# Patient Record
Sex: FEMALE | Race: WHITE | NOT HISPANIC OR LATINO | Employment: FULL TIME | URBAN - METROPOLITAN AREA
[De-identification: names, ages, dates, MRNs, and addresses within clinical notes are randomized per-mention and may not be internally consistent; named-entity substitution may affect disease eponyms.]

---

## 2017-01-02 ENCOUNTER — ANTICOAGULATION THERAPY VISIT (OUTPATIENT)
Dept: ANTICOAGULATION | Facility: CLINIC | Age: 37
End: 2017-01-02

## 2017-01-02 DIAGNOSIS — Z79.01 LONG-TERM (CURRENT) USE OF ANTICOAGULANTS: Primary | ICD-10-CM

## 2017-01-02 DIAGNOSIS — D68.52 PROTHROMBIN GENE MUTATION (H): ICD-10-CM

## 2017-01-03 ENCOUNTER — HOSPITAL ENCOUNTER (OUTPATIENT)
Dept: LAB | Facility: CLINIC | Age: 37
Discharge: HOME | End: 2017-01-03
Attending: INTERNAL MEDICINE | Admitting: INTERNAL MEDICINE
Payer: COMMERCIAL

## 2017-01-03 DIAGNOSIS — D68.52: ICD-10-CM

## 2017-01-03 LAB — INR PPP: 2.31 (ref 0.86–1.14)

## 2017-01-03 PROCEDURE — 85610 PROTHROMBIN TIME: CPT | Performed by: INTERNAL MEDICINE

## 2017-01-03 PROCEDURE — 36415 COLL VENOUS BLD VENIPUNCTURE: CPT | Performed by: INTERNAL MEDICINE

## 2017-01-04 ENCOUNTER — ANTICOAGULATION THERAPY VISIT (OUTPATIENT)
Dept: ANTICOAGULATION | Facility: CLINIC | Age: 37
End: 2017-01-04

## 2017-01-04 DIAGNOSIS — Z79.01 LONG-TERM (CURRENT) USE OF ANTICOAGULANTS: Primary | ICD-10-CM

## 2017-01-04 DIAGNOSIS — D68.52 PROTHROMBIN GENE MUTATION (H): ICD-10-CM

## 2017-01-04 NOTE — PROGRESS NOTES
ANTICOAGULATION FOLLOW-UP CLINIC VISIT    Patient Name:  Stacey Maki  Date:  1/4/2017  Contact Type:  Telephone    SUBJECTIVE:        OBJECTIVE    INR   Date Value Ref Range Status   01/03/2017 2.31* 0.86 - 1.14 Final       ASSESSMENT / PLAN  INR assessment THER    Recheck INR In: 4 WEEKS    INR Location Clinic      Anticoagulation Summary as of 1/4/2017     INR goal 2.0-3.0   Selected INR 2.31 (1/3/2017)   Maintenance plan 4 mg (2 mg x 2) every day   Full instructions 4 mg every day   Weekly total 28 mg   Plan last modified Karina Khoury RN (11/11/2016)   Next INR check 1/31/2017   Priority INR   Target end date     Indications   Long-term (current) use of anticoagulants [Z79.01] [Z79.01]  Prothrombin gene mutation (H) [D68.59]         Anticoagulation Episode Summary     INR check location     Preferred lab     Send INR reminders to [facility]    Comments Patient has 2 mg Tablets  Patient contact number [phone]  HIPAA INFO OK to leave messages on home or cell phone    OK to speak byron Potts      Anticoagulation Care Providers     Provider Role Specialty Phone number    Danny Clark MD Responsible Hematology [phone]            See the Encounter Report to view Anticoagulation Flowsheet and Dosing Calendar (Go to Encounters tab in chart review, and find the Anticoagulation Therapy Visit)  LM for patient.    Karina Khoury RN

## 2017-03-07 ENCOUNTER — HOSPITAL ENCOUNTER (OUTPATIENT)
Dept: LAB | Facility: CLINIC | Age: 37
Discharge: HOME | End: 2017-03-07
Attending: INTERNAL MEDICINE | Admitting: INTERNAL MEDICINE
Payer: COMMERCIAL

## 2017-03-07 DIAGNOSIS — D68.52: ICD-10-CM

## 2017-03-07 LAB — INR PPP: 1.33 (ref 0.86–1.14)

## 2017-03-07 PROCEDURE — 85610 PROTHROMBIN TIME: CPT | Performed by: INTERNAL MEDICINE

## 2017-03-07 PROCEDURE — 36415 COLL VENOUS BLD VENIPUNCTURE: CPT | Performed by: INTERNAL MEDICINE

## 2017-03-08 ENCOUNTER — ANTICOAGULATION THERAPY VISIT (OUTPATIENT)
Dept: ANTICOAGULATION | Facility: CLINIC | Age: 37
End: 2017-03-08

## 2017-03-08 DIAGNOSIS — D68.52 PROTHROMBIN GENE MUTATION (H): ICD-10-CM

## 2017-03-08 DIAGNOSIS — Z79.01 LONG-TERM (CURRENT) USE OF ANTICOAGULANTS: ICD-10-CM

## 2017-03-08 NOTE — PROGRESS NOTES
ANTICOAGULATION FOLLOW-UP CLINIC VISIT    Patient Name:  Stacey Maki  Date:  3/8/2017  Contact Type:  Telephone    SUBJECTIVE:     Patient Findings     Positives Change in diet/appetite, Missed doses    Comments Pt reports missing dose on Олег 3/5. Also reports going on [employer], but her green intake is the same she has just cut out processed food           OBJECTIVE    INR   Date Value Ref Range Status   03/07/2017 1.33 (H) 0.86 - 1.14 Final       ASSESSMENT / PLAN  INR assessment SUB    Recheck INR In: 1 WEEK    INR Location Clinic      Anticoagulation Summary as of 3/8/2017     INR goal 2.0-3.0   Today's INR 1.33! (3/7/2017)   Maintenance plan 4 mg (2 mg x 2) every day   Full instructions 3/8: 6 mg; Otherwise 4 mg every day   Weekly total 28 mg   Plan last modified Karina Khoury RN (11/11/2016)   Next INR check 3/15/2017   Priority INR   Target end date     Indications   Long-term (current) use of anticoagulants [Z79.01] [Z79.01]  Prothrombin gene mutation (H) [D68.59]         Anticoagulation Episode Summary     INR check location     Preferred lab     Send INR reminders to [facility]    Comments Patient has 2 mg Tablets  Patient contact number [phone]  HIPAA INFO OK to leave messages on home or cell phone    OK to speak byron Potts      Anticoagulation Care Providers     Provider Role Specialty Phone number    Danny Clark MD Responsible Hematology [phone]            See the Encounter Report to view Anticoagulation Flowsheet and Dosing Calendar (Go to Encounters tab in chart review, and find the Anticoagulation Therapy Visit)    Spoke with Stacey Blackwood RN

## 2017-03-08 NOTE — MR AVS SNAPSHOT
Stacey Maki   3/8/2017   Anticoagulation Therapy Visit    Description:  36 year old female   Provider:  Praveena Blackwood, RN   Department:  [facility]           INR as of 3/8/2017     Today's INR 1.33! (3/7/2017)      Anticoagulation Summary as of 3/8/2017     INR goal 2.0-3.0   Today's INR 1.33! (3/7/2017)   Full instructions 3/8: 6 mg; Otherwise 4 mg every day   Next INR check 3/15/2017    Indications   Long-term (current) use of anticoagulants [Z79.01] [Z79.01]  Prothrombin gene mutation (H) [D68.59]         March 2017 Details    Sun Mon Tue Wed Thu Fri Sat        1               2               3               4                 5               6               7               8      6 mg   See details      9      4 mg         10      4 mg         11      4 mg           12      4 mg         13      4 mg         14      4 mg         15            16               17               18                 19               20               21               22               23               24               25                 26               27               28               29               30               31                 Date Details   03/08 This INR check       Date of next INR:  3/15/2017         How to take your warfarin dose     To take:  4 mg Take 2 of the 2 mg tablets.    To take:  6 mg Take 3 of the 2 mg tablets.

## 2017-03-15 ENCOUNTER — ANTICOAGULATION THERAPY VISIT (OUTPATIENT)
Dept: ANTICOAGULATION | Facility: CLINIC | Age: 37
End: 2017-03-15

## 2017-03-15 ENCOUNTER — HOSPITAL ENCOUNTER (OUTPATIENT)
Dept: LAB | Facility: CLINIC | Age: 37
Discharge: HOME | End: 2017-03-15
Attending: INTERNAL MEDICINE | Admitting: INTERNAL MEDICINE
Payer: COMMERCIAL

## 2017-03-15 DIAGNOSIS — Z79.01 LONG-TERM (CURRENT) USE OF ANTICOAGULANTS: ICD-10-CM

## 2017-03-15 DIAGNOSIS — D68.52 PROTHROMBIN GENE MUTATION (H): ICD-10-CM

## 2017-03-15 DIAGNOSIS — D68.52: ICD-10-CM

## 2017-03-15 LAB — INR PPP: 1.98 (ref 0.86–1.14)

## 2017-03-15 PROCEDURE — 85610 PROTHROMBIN TIME: CPT | Performed by: INTERNAL MEDICINE

## 2017-03-15 PROCEDURE — 36415 COLL VENOUS BLD VENIPUNCTURE: CPT | Performed by: INTERNAL MEDICINE

## 2017-03-15 NOTE — PROGRESS NOTES
ANTICOAGULATION FOLLOW-UP CLINIC VISIT    Patient Name:  Stacey Maki  Date:  3/15/2017  Contact Type:  Telephone    SUBJECTIVE:     Patient Findings     Comments Stacey is starting Valtrex today for shingles.  She reports that it is a small area near her lip.  According to the literature Valtrex should not interfere with the warfarin.  If Stacey's shingle case becomes worse she will have her INR tested next week.           OBJECTIVE    INR   Date Value Ref Range Status   03/15/2017 1.98 (H) 0.86 - 1.14 Final       ASSESSMENT / PLAN  INR assessment THER    Recheck INR In: 2 WEEKS    INR Location Clinic      Anticoagulation Summary as of 3/15/2017     INR goal 2.0-3.0   Today's INR 1.98!   Maintenance plan 6 mg (2 mg x 3) on Wed; 4 mg (2 mg x 2) all other days   Full instructions 3/15: 6 mg; Otherwise 6 mg on Wed; 4 mg all other days   Weekly total 30 mg   Plan last modified Leticia Cho RN (3/15/2017)   Next INR check 3/22/2017   Priority INR   Target end date     Indications   Long-term (current) use of anticoagulants [Z79.01] [Z79.01]  Prothrombin gene mutation (H) [D68.59]         Anticoagulation Episode Summary     INR check location     Preferred lab     Send [facility] reminders to [facility]    Comments Patient has 2 mg Tablets  Patient contact number [phone]  HIPAA INFO OK to leave messages on home or cell phone    OK to speak byron Potts      Anticoagulation Care Providers     Provider Role Specialty Phone number    Danny Clark MD Responsible Hematology [phone]            See the Encounter Report to view Anticoagulation Flowsheet and Dosing Calendar (Go to Encounters tab in chart review, and find the Anticoagulation Therapy Visit)    Spoke with Stacey.    Leticia Cho RN

## 2017-03-15 NOTE — MR AVS SNAPSHOT
Stacey Laurynmariano Maki   3/15/2017   Anticoagulation Therapy Visit    Description:  36 year old female   Provider:  Leticia Cho, RN   Department:  [facility]           INR as of 3/15/2017     Today's INR 1.98!      Anticoagulation Summary as of 3/15/2017     INR goal 2.0-3.0   Today's INR 1.98!   Full instructions 3/15: 6 mg; Otherwise 6 mg on Wed; 4 mg all other days   Next INR check 3/22/2017    Indications   Long-term (current) use of anticoagulants [Z79.01] [Z79.01]  Prothrombin gene mutation (H) [D68.59]         March 2017 Details    Sun Mon Tue Wed Thu Fri Sat        1               2               3               4                 5               6               7               8               9               10               11                 12               13               14               15      6 mg   See details      16      4 mg         17      4 mg         18      4 mg           19      4 mg         20      4 mg         21      4 mg         22            23               24               25                 26               27               28               29               30               31                 Date Details   03/15 This INR check       Date of next INR:  3/22/2017         How to take your warfarin dose     To take:  4 mg Take 2 of the 2 mg tablets.    To take:  6 mg Take 3 of the 2 mg tablets.

## 2017-04-05 ENCOUNTER — ANTICOAGULATION THERAPY VISIT (OUTPATIENT)
Dept: ANTICOAGULATION | Facility: CLINIC | Age: 37
End: 2017-04-05

## 2017-04-05 DIAGNOSIS — D68.52 PROTHROMBIN GENE MUTATION (H): ICD-10-CM

## 2017-04-05 DIAGNOSIS — Z79.01 LONG-TERM (CURRENT) USE OF ANTICOAGULANTS: ICD-10-CM

## 2017-04-05 NOTE — MR AVS SNAPSHOT
Stacey Maki   4/5/2017   Anticoagulation Therapy Visit    Description:  37 year old female   Provider:  Adam Koch, [facility]   Department:  [facility]           INR as of 4/5/2017     Today's INR No new INR was available at the time of this encounter.      Anticoagulation Summary as of 4/5/2017     INR goal 2.0-3.0   Today's INR No new INR was available at the time of this encounter.   Full instructions 6 mg on Wed; 4 mg all other days   Next INR check 4/7/2017    Indications   Long-term (current) use of anticoagulants [Z79.01] [Z79.01]  Prothrombin gene mutation (H) [D68.59]         April 2017 Details    Sun Mon Tue Wed Thu Fri Sat           1                 2               3               4               5      6 mg   See details      6      4 mg         7            8                 9               10               11               12               13               14               15                 16               17               18               19               20               21               22                 23               24               25               26               27               28               29                 30                      Date Details   04/05 This INR check       Date of next INR:  4/7/2017         How to take your warfarin dose     To take:  4 mg Take 2 of the 2 mg tablets.    To take:  6 mg Take 3 of the 2 mg tablets.

## 2017-04-05 NOTE — PROGRESS NOTES
Stacey called today to say that she has been on vacation and now has sick children. She will come in for her labs in the next 2 days.

## 2017-04-07 ENCOUNTER — HOSPITAL ENCOUNTER (OUTPATIENT)
Dept: LAB | Facility: CLINIC | Age: 37
Discharge: HOME | End: 2017-04-07
Attending: INTERNAL MEDICINE | Admitting: INTERNAL MEDICINE
Payer: COMMERCIAL

## 2017-04-07 ENCOUNTER — ANTICOAGULATION THERAPY VISIT (OUTPATIENT)
Dept: ANTICOAGULATION | Facility: CLINIC | Age: 37
End: 2017-04-07

## 2017-04-07 DIAGNOSIS — Z79.01 LONG-TERM (CURRENT) USE OF ANTICOAGULANTS: ICD-10-CM

## 2017-04-07 DIAGNOSIS — D68.52 PROTHROMBIN GENE MUTATION (H): ICD-10-CM

## 2017-04-07 LAB — INR PPP: 3.01 (ref 0.86–1.14)

## 2017-04-07 PROCEDURE — 85610 PROTHROMBIN TIME: CPT | Performed by: INTERNAL MEDICINE

## 2017-04-07 PROCEDURE — 36415 COLL VENOUS BLD VENIPUNCTURE: CPT | Performed by: INTERNAL MEDICINE

## 2017-04-07 NOTE — PROGRESS NOTES
ANTICOAGULATION FOLLOW-UP CLINIC VISIT    Patient Name:  Stacey Maki  Date:  4/7/2017  Contact Type:  Telephone    SUBJECTIVE:        OBJECTIVE    INR   Date Value Ref Range Status   04/07/2017 3.01 (H) 0.86 - 1.14 Final       ASSESSMENT / PLAN  INR assessment THER    Recheck INR In: 2 WEEKS    INR Location Clinic      Anticoagulation Summary as of 4/7/2017     INR goal 2.0-3.0   Today's INR 3.01!   Maintenance plan 5 mg (2 mg x 2.5) on Wed; 4 mg (2 mg x 2) all other days   Full instructions 5 mg on Wed; 4 mg all other days   Weekly total 29 mg   Plan last modified Karina Khoury RN (4/7/2017)   Next INR check 4/21/2017   Priority INR   Target end date     Indications   Long-term (current) use of anticoagulants [Z79.01] [Z79.01]  Prothrombin gene mutation (H) [D68.59]         Anticoagulation Episode Summary     INR check location     Preferred lab     Send INR reminders to [facility]    Comments Patient has 2 mg Tablets  Patient contact number [phone]  HIPAA INFO OK to leave messages on home or cell phone    OK to speak byron Potts      Anticoagulation Care Providers     Provider Role Specialty Phone number    Danny Clark MD Responsible Hematology [phone]            See the Encounter Report to view Anticoagulation Flowsheet and Dosing Calendar (Go to Encounters tab in chart review, and find the Anticoagulation Therapy Visit)    LM for patient.    Karina Khoury RN

## 2017-04-07 NOTE — MR AVS SNAPSHOT
Stacey Combs Glynn   4/7/2017   Anticoagulation Therapy Visit    Description:  37 year old female   Provider:  Karina Khoury, RN   Department:  [facility]           INR as of 4/7/2017     Today's INR 3.01!      Anticoagulation Summary as of 4/7/2017     INR goal 2.0-3.0   Today's INR 3.01!   Full instructions 5 mg on Wed; 4 mg all other days   Next INR check 4/21/2017    Indications   Long-term (current) use of anticoagulants [Z79.01] [Z79.01]  Prothrombin gene mutation (H) [D68.59]         April 2017 Details    Sun Mon Tue Wed Thu Fri Sat           1                 2               3               4               5               6               7      4 mg   See details      8      4 mg           9      4 mg         10      4 mg         11      4 mg         12      5 mg         13      4 mg         14      4 mg         15      4 mg           16      4 mg         17      4 mg         18      4 mg         19      5 mg         20      4 mg         21            22                 23               24               25               26               27               28               29                 30                      Date Details   04/07 This INR check       Date of next INR:  4/21/2017         How to take your warfarin dose     To take:  4 mg Take 2 of the 2 mg tablets.    To take:  5 mg Take 2.5 of the 2 mg tablets.

## 2017-05-02 ENCOUNTER — ANTICOAGULATION THERAPY VISIT (OUTPATIENT)
Dept: ANTICOAGULATION | Facility: CLINIC | Age: 37
End: 2017-05-02

## 2017-05-02 ENCOUNTER — HOSPITAL ENCOUNTER (OUTPATIENT)
Dept: LAB | Facility: CLINIC | Age: 37
Discharge: HOME | End: 2017-05-02
Attending: INTERNAL MEDICINE | Admitting: INTERNAL MEDICINE
Payer: COMMERCIAL

## 2017-05-02 DIAGNOSIS — Z79.01 LONG-TERM (CURRENT) USE OF ANTICOAGULANTS: ICD-10-CM

## 2017-05-02 DIAGNOSIS — D68.52 PROTHROMBIN GENE MUTATION (H): ICD-10-CM

## 2017-05-02 LAB — INR PPP: 2.08 (ref 0.86–1.14)

## 2017-05-02 PROCEDURE — 85610 PROTHROMBIN TIME: CPT | Performed by: INTERNAL MEDICINE

## 2017-05-02 PROCEDURE — 36415 COLL VENOUS BLD VENIPUNCTURE: CPT | Performed by: INTERNAL MEDICINE

## 2017-05-02 NOTE — PROGRESS NOTES
ANTICOAGULATION FOLLOW-UP CLINIC VISIT    Patient Name:  Stacey Maki  Date:  5/2/2017  Contact Type:  Telephone    SUBJECTIVE:        OBJECTIVE    INR   Date Value Ref Range Status   05/02/2017 2.08 (H) 0.86 - 1.14 Final       ASSESSMENT / PLAN  INR assessment THER    Recheck INR In: 4 WEEKS    INR Location Clinic      Anticoagulation Summary as of 5/2/2017     INR goal 2.0-3.0   Today's INR 2.08   Maintenance plan 6 mg (2 mg x 3) on Wed; 4 mg (2 mg x 2) all other days   Full instructions 6 mg on Wed; 4 mg all other days   Weekly total 30 mg   Plan last modified Karina Khoury RN (5/2/2017)   Next INR check 5/30/2017   Priority INR   Target end date     Indications   Long-term (current) use of anticoagulants [Z79.01] [Z79.01]  Prothrombin gene mutation (H) [D68.59]         Anticoagulation Episode Summary     INR check location     Preferred lab     Send INR reminders to [facility]    Comments Patient has 2 mg Tablets  Patient contact number [phone]  HIPAA INFO OK to leave messages on home or cell phone    OK to speak byron Potts      Anticoagulation Care Providers     Provider Role Specialty Phone number    Danny Clark MD Responsible Hematology [phone]            See the Encounter Report to view Anticoagulation Flowsheet and Dosing Calendar (Go to Encounters tab in chart review, and find the Anticoagulation Therapy Visit)    Left message for patient with results and dosing recommendations. Asked patient to call back to report any missed doses, falls, signs and symptoms of bleeding or clotting, any changes in health, medication, or diet. Asked patient to call back with any questions or concerns.      Karina Khoury RN

## 2017-05-02 NOTE — MR AVS SNAPSHOT
Stacey Combs Maki   5/2/2017   Anticoagulation Therapy Visit    Description:  37 year old female   Provider:  Karina Khoury, RN   Department:  [facility]           INR as of 5/2/2017     Today's INR 2.08      Anticoagulation Summary as of 5/2/2017     INR goal 2.0-3.0   Today's INR 2.08   Full instructions 6 mg on Wed; 4 mg all other days   Next INR check 5/30/2017    Indications   Long-term (current) use of anticoagulants [Z79.01] [Z79.01]  Prothrombin gene mutation (H) [D68.59]         May 2017 Details    Sun Mon Tue Wed Thu Fri Sat      1               2      4 mg   See details      3      6 mg         4      4 mg         5      4 mg         6      4 mg           7      4 mg         8      4 mg         9      4 mg         10      6 mg         11      4 mg         12      4 mg         13      4 mg           14      4 mg         15      4 mg         16      4 mg         17      6 mg         18      4 mg         19      4 mg         20      4 mg           21      4 mg         22      4 mg         23      4 mg         24      6 mg         25      4 mg         26      4 mg         27      4 mg           28      4 mg         29      4 mg         30            31                   Date Details   05/02 This INR check       Date of next INR:  5/30/2017         How to take your warfarin dose     To take:  4 mg Take 2 of the 2 mg tablets.    To take:  6 mg Take 3 of the 2 mg tablets.

## 2017-06-19 ENCOUNTER — HOSPITAL ENCOUNTER (OUTPATIENT)
Dept: LAB | Facility: CLINIC | Age: 37
Discharge: HOME | End: 2017-06-19
Attending: INTERNAL MEDICINE | Admitting: INTERNAL MEDICINE
Payer: COMMERCIAL

## 2017-06-19 DIAGNOSIS — Z79.01 LONG-TERM (CURRENT) USE OF ANTICOAGULANTS: ICD-10-CM

## 2017-06-19 DIAGNOSIS — D68.52 PROTHROMBIN GENE MUTATION (H): ICD-10-CM

## 2017-06-19 LAB — INR PPP: 2.13 (ref 0.86–1.14)

## 2017-06-19 PROCEDURE — 36415 COLL VENOUS BLD VENIPUNCTURE: CPT | Performed by: INTERNAL MEDICINE

## 2017-06-19 PROCEDURE — 85610 PROTHROMBIN TIME: CPT | Performed by: INTERNAL MEDICINE

## 2017-06-20 ENCOUNTER — ANTICOAGULATION THERAPY VISIT (OUTPATIENT)
Dept: ANTICOAGULATION | Facility: CLINIC | Age: 37
End: 2017-06-20

## 2017-06-20 DIAGNOSIS — D68.52 PROTHROMBIN GENE MUTATION (H): ICD-10-CM

## 2017-06-20 DIAGNOSIS — Z79.01 LONG-TERM (CURRENT) USE OF ANTICOAGULANTS: ICD-10-CM

## 2017-06-20 NOTE — MR AVS SNAPSHOT
Stacey Combs Glynn   6/20/2017   Anticoagulation Therapy Visit    Description:  37 year old female   Provider:  Merced Morrell, RN   Department:  [facility]           INR as of 6/20/2017     Today's INR 2.13 (6/19/2017)      Anticoagulation Summary as of 6/20/2017     INR goal 2.0-3.0   Today's INR 2.13 (6/19/2017)   Full instructions 6 mg on Wed; 4 mg all other days   Next INR check 7/17/2017    Indications   Long-term (current) use of anticoagulants [Z79.01] [Z79.01]  Prothrombin gene mutation (H) [D68.59]         June 2017 Details    Sun Mon Tue Wed Thu Fri Sat         1               2               3                 4               5               6               7               8               9               10                 11               12               13               14               15               16               17                 18               19               20      4 mg   See details      21      6 mg         22      4 mg         23      4 mg         24      4 mg           25      4 mg         26      4 mg         27      4 mg         28      6 mg         29      4 mg         30      4 mg           Date Details   06/20 This INR check               How to take your warfarin dose     To take:  4 mg Take 2 of the 2 mg tablets.    To take:  6 mg Take 3 of the 2 mg tablets.           July 2017 Details    Sun Mon Tue Wed Thu Fri Sat           1      4 mg           2      4 mg         3      4 mg         4      4 mg         5      6 mg         6      4 mg         7      4 mg         8      4 mg           9      4 mg         10      4 mg         11      4 mg         12      6 mg         13      4 mg         14      4 mg         15      4 mg           16      4 mg         17            18               19               20               21               22                 23               24               25               26               27               28               29                  30               31                     Date Details   No additional details    Date of next INR:  7/17/2017         How to take your warfarin dose     To take:  4 mg Take 2 of the 2 mg tablets.    To take:  6 mg Take 3 of the 2 mg tablets.

## 2017-06-20 NOTE — PROGRESS NOTES
ANTICOAGULATION FOLLOW-UP CLINIC VISIT    Patient Name:  Stacey Maki  Date:  6/20/2017  Contact Type:  Telephone    SUBJECTIVE:        OBJECTIVE    INR   Date Value Ref Range Status   06/19/2017 2.13 (H) 0.86 - 1.14 Final       ASSESSMENT / PLAN  INR assessment THER    Recheck INR In: 4 WEEKS    INR Location Clinic      Anticoagulation Summary as of 6/20/2017     INR goal 2.0-3.0   Today's INR 2.13 (6/19/2017)   Maintenance plan 6 mg (2 mg x 3) on Wed; 4 mg (2 mg x 2) all other days   Full instructions 6 mg on Wed; 4 mg all other days   Weekly total 30 mg   No change documented Merced Morrell RN   Plan last modified Karina Khoury RN (5/2/2017)   Next INR check 7/17/2017   Priority INR   Target end date     Indications   Long-term (current) use of anticoagulants [Z79.01] [Z79.01]  Prothrombin gene mutation (H) [D68.59]         Anticoagulation Episode Summary     INR check location     Preferred lab     Send INR reminders to [facility]    Comments Patient has 2 mg Tablets  Patient contact number [phone]  HIPAA INFO OK to leave messages on home or cell phone    OK to speak byron Potts      Anticoagulation Care Providers     Provider Role Specialty Phone number    Danny Clark MD Responsible Hematology [phone]            See the Encounter Report to view Anticoagulation Flowsheet and Dosing Calendar (Go to Encounters tab in chart review, and find the Anticoagulation Therapy Visit)    Left message with results and dosing recommendations. Asked patient to call back to report any missed doses, falls, signs and symptoms of bleeding or clotting, or any changes to health or diet.     Merced Morrell, RN

## 2017-07-05 DIAGNOSIS — I82.409 DEEP VEIN THROMBOSIS (H): ICD-10-CM

## 2017-07-05 RX ORDER — WARFARIN SODIUM 2 MG/1
TABLET ORAL
Qty: 65 TABLET | Refills: 0 | Status: SHIPPED | OUTPATIENT
Start: 2017-07-05 | End: 2017-09-11

## 2017-08-14 DIAGNOSIS — Z79.01 LONG TERM CURRENT USE OF ANTICOAGULANT THERAPY: Primary | ICD-10-CM

## 2017-08-14 RX ORDER — WARFARIN SODIUM 2 MG/1
TABLET ORAL
Qty: 65 TABLET | Refills: 0 | Status: SHIPPED | OUTPATIENT
Start: 2017-08-14 | End: 2017-09-11 | Stop reason: DRUGHIGH

## 2017-08-23 ENCOUNTER — ANTICOAGULATION THERAPY VISIT (OUTPATIENT)
Dept: ANTICOAGULATION | Facility: CLINIC | Age: 37
End: 2017-08-23

## 2017-08-23 DIAGNOSIS — D68.52 PROTHROMBIN GENE MUTATION (H): ICD-10-CM

## 2017-08-23 DIAGNOSIS — Z79.01 LONG-TERM (CURRENT) USE OF ANTICOAGULANTS: ICD-10-CM

## 2017-08-23 NOTE — MR AVS SNAPSHOT
Stacey Maki   8/23/2017   Anticoagulation Therapy Visit    Description:  37 year old female   Provider:  Adam Koch, [facility]   Department:  [facility]           INR as of 8/23/2017     Today's INR       Anticoagulation Summary as of 8/23/2017     INR goal 2.0-3.0   Today's INR    Full instructions 6 mg on Wed; 4 mg all other days   Next INR check 8/23/2017    Indications   Long-term (current) use of anticoagulants [Z79.01] [Z79.01]  Prothrombin gene mutation (H) [D68.52]         August 2017 Details    Sun Mon Tue Wed Thu Fri Sat       1               2               3               4               5                 6               7               8               9               10               11               12                 13               14               15               16               17               18               19                 20               21               22               23      See details      24               25               26                 27               28               29               30               31                  Date Details   08/23 This INR check       Date of next INR:  8/23/2017         How to take your warfarin dose     To take:  6 mg Take 3 of the 2 mg tablets.

## 2017-08-23 NOTE — PROGRESS NOTES
Left message for patient that they have not been compliant with having the necessary lab work for their anticoagulation therapy. Patient  has been sent two letters stating that they have missed their lab appointments and that it is very important to have labs done to make sure that they are in their therapeutic range to minimize the risks of bleeding and clotting. I asked them to call us or come in for their lab work as soon as possible. Patient will be inactivated from our anticoagulation monitoring service if they do not respond within one week.   8/31/17    Stacey called to report that she will be in this week to have her INR done. WK

## 2017-08-24 ENCOUNTER — HOSPITAL ENCOUNTER (OUTPATIENT)
Dept: LAB | Facility: CLINIC | Age: 37
Discharge: HOME | End: 2017-08-24
Attending: INTERNAL MEDICINE | Admitting: INTERNAL MEDICINE
Payer: COMMERCIAL

## 2017-08-24 DIAGNOSIS — Z79.01 LONG-TERM (CURRENT) USE OF ANTICOAGULANTS: ICD-10-CM

## 2017-08-24 DIAGNOSIS — D68.52 PROTHROMBIN GENE MUTATION (H): ICD-10-CM

## 2017-08-24 LAB — INR PPP: 2.44 (ref 0.86–1.14)

## 2017-08-24 PROCEDURE — 36415 COLL VENOUS BLD VENIPUNCTURE: CPT | Performed by: INTERNAL MEDICINE

## 2017-08-24 PROCEDURE — 85610 PROTHROMBIN TIME: CPT | Performed by: INTERNAL MEDICINE

## 2017-09-11 ENCOUNTER — ANTICOAGULATION THERAPY VISIT (OUTPATIENT)
Dept: ANTICOAGULATION | Facility: CLINIC | Age: 37
End: 2017-09-11

## 2017-09-11 DIAGNOSIS — D68.52 PROTHROMBIN GENE MUTATION (H): ICD-10-CM

## 2017-09-11 DIAGNOSIS — I82.409 DEEP VEIN THROMBOSIS (H): ICD-10-CM

## 2017-09-11 DIAGNOSIS — Z79.01 LONG-TERM (CURRENT) USE OF ANTICOAGULANTS: ICD-10-CM

## 2017-09-11 RX ORDER — WARFARIN SODIUM 2 MG/1
TABLET ORAL
Qty: 65 TABLET | Refills: 4 | Status: SHIPPED | OUTPATIENT
Start: 2017-09-11 | End: 2018-02-19

## 2017-09-11 NOTE — PROGRESS NOTES
ANTICOAGULATION FOLLOW-UP CLINIC VISIT    Patient Name:  Stacey Maki  Date:  9/11/2017  Contact Type:  Telephone    SUBJECTIVE:        OBJECTIVE    INR   Date Value Ref Range Status   08/24/2017 2.44 (H) 0.86 - 1.14 Final       ASSESSMENT / PLAN  INR assessment THER    Recheck INR In: 6 WEEKS    INR Location Clinic      Anticoagulation Summary as of 9/11/2017     INR goal 2.0-3.0   Today's INR 2.44 (8/24/2017)   Maintenance plan 6 mg (2 mg x 3) on Wed; 4 mg (2 mg x 2) all other days   Full instructions 6 mg on Wed; 4 mg all other days   Weekly total 30 mg   Plan last modified Karina Khoury RN (5/2/2017)   Next INR check 10/5/2017   Priority INR   Target end date     Indications   Long-term (current) use of anticoagulants [Z79.01] [Z79.01]  Prothrombin gene mutation (H) [D68.52]         Anticoagulation Episode Summary     INR check location     Preferred lab     Send INR reminders to [facility]    Comments Patient has 2 mg Tablets  Patient contact number [phone]  HIPAA INFO OK to leave messages on home or cell phone    OK to speak byron Potts      Anticoagulation Care Providers     Provider Role Specialty Phone number    Danny Clark MD Responsible Hematology [phone]            See the Encounter Report to view Anticoagulation Flowsheet and Dosing Calendar (Go to Encounters tab in chart review, and find the Anticoagulation Therapy Visit)    Left message for patient with results and dosing recommendations. Asked patient to call back to report any missed doses, falls, signs and symptoms of bleeding or clotting, any changes in health, medication, or diet. Asked patient to call back with any questions or concerns.      Adam Koch [facility]

## 2017-09-11 NOTE — MR AVS SNAPSHOT
Stacey Combs Maki   9/11/2017   Anticoagulation Therapy Visit    Description:  37 year old female   Provider:  Adam Koch [facility]   Department:  [facility]           INR as of 9/11/2017     Today's INR 2.44 (8/24/2017)      Anticoagulation Summary as of 9/11/2017     INR goal 2.0-3.0   Today's INR 2.44 (8/24/2017)   Full instructions 6 mg on Wed; 4 mg all other days   Next INR check 10/5/2017    Indications   Long-term (current) use of anticoagulants [Z79.01] [Z79.01]  Prothrombin gene mutation (H) [D68.52]         September 2017 Details    Sun Mon Tue Wed Thu Fri Sat          1               2                 3               4               5               6               7               8               9                 10               11      4 mg   See details      12      4 mg         13      6 mg         14      4 mg         15      4 mg         16      4 mg           17      4 mg         18      4 mg         19      4 mg         20      6 mg         21      4 mg         22      4 mg         23      4 mg           24      4 mg         25      4 mg         26      4 mg         27      6 mg         28      4 mg         29      4 mg         30      4 mg          Date Details   09/11 This INR check               How to take your warfarin dose     To take:  4 mg Take 2 of the 2 mg tablets.    To take:  6 mg Take 3 of the 2 mg tablets.           October 2017 Details    Sun Mon Tue Wed Thu Fri Sat     1      4 mg         2      4 mg         3      4 mg         4      6 mg         5            6               7                 8               9               10               11               12               13               14                 15               16               17               18               19               20               21                 22               23               24               25               26               27               28                 29               30                31                    Date Details   No additional details    Date of next INR:  10/5/2017         How to take your warfarin dose     To take:  4 mg Take 2 of the 2 mg tablets.    To take:  6 mg Take 3 of the 2 mg tablets.

## 2017-09-14 DIAGNOSIS — F32.81 PMDD (PREMENSTRUAL DYSPHORIC DISORDER): ICD-10-CM

## 2017-09-14 RX ORDER — FLUOXETINE 10 MG/1
20 CAPSULE ORAL DAILY
Qty: 30 CAPSULE | Refills: 0 | Status: SHIPPED | OUTPATIENT
Start: 2017-09-14 | End: 2017-10-17

## 2017-09-14 NOTE — TELEPHONE ENCOUNTER
Received refill request for fluoxetine.  Last in clinic 10/2016.      Tried to reach Stacey but received voicemail.  Left message that refill request was received and a one month supply can be sent to pharmacy but office visit is due for further refills. Please call [phone] to schedule.

## 2017-10-05 ASSESSMENT — ENCOUNTER SYMPTOMS
RECTAL PAIN: 0
INCREASED ENERGY: 1
NERVOUS/ANXIOUS: 1
MUSCLE WEAKNESS: 0
JAUNDICE: 0
MUSCLE CRAMPS: 1
HOARSE VOICE: 1
NAUSEA: 0
BLOOD IN STOOL: 0
NECK PAIN: 1
BLOATING: 1
TASTE DISTURBANCE: 0
ABDOMINAL PAIN: 0
STIFFNESS: 1
SINUS PAIN: 0
WEIGHT LOSS: 0
ARTHRALGIAS: 1
DEPRESSION: 1
MYALGIAS: 1
INSOMNIA: 1
FATIGUE: 1
CONSTIPATION: 1
DECREASED APPETITE: 0
POLYPHAGIA: 1
PANIC: 0
HALLUCINATIONS: 0
VOMITING: 0
POLYDIPSIA: 0
SMELL DISTURBANCE: 0
NIGHT SWEATS: 1
NECK MASS: 0
DIARRHEA: 1
SORE THROAT: 1
RECTAL BLEEDING: 0
BACK PAIN: 1
SINUS CONGESTION: 1
JOINT SWELLING: 0
TROUBLE SWALLOWING: 1
WEIGHT GAIN: 1
FEVER: 1
ALTERED TEMPERATURE REGULATION: 0
CHILLS: 0
BOWEL INCONTINENCE: 0
DECREASED CONCENTRATION: 1
HEARTBURN: 0

## 2017-10-05 ASSESSMENT — ANXIETY QUESTIONNAIRES
GAD7 TOTAL SCORE: 10
7. FEELING AFRAID AS IF SOMETHING AWFUL MIGHT HAPPEN: SEVERAL DAYS
4. TROUBLE RELAXING: SEVERAL DAYS
3. WORRYING TOO MUCH ABOUT DIFFERENT THINGS: SEVERAL DAYS
5. BEING SO RESTLESS THAT IT IS HARD TO SIT STILL: SEVERAL DAYS
1. FEELING NERVOUS, ANXIOUS, OR ON EDGE: MORE THAN HALF THE DAYS
GAD7 TOTAL SCORE: 10
6. BECOMING EASILY ANNOYED OR IRRITABLE: NEARLY EVERY DAY
GAD7 TOTAL SCORE: 10
7. FEELING AFRAID AS IF SOMETHING AWFUL MIGHT HAPPEN: SEVERAL DAYS
2. NOT BEING ABLE TO STOP OR CONTROL WORRYING: SEVERAL DAYS

## 2017-10-06 ASSESSMENT — ANXIETY QUESTIONNAIRES: GAD7 TOTAL SCORE: 10

## 2017-10-10 ENCOUNTER — OFFICE VISIT (OUTPATIENT)
Dept: OBGYN | Facility: CLINIC | Age: 37
End: 2017-10-10
Attending: OBSTETRICS & GYNECOLOGY
Payer: COMMERCIAL

## 2017-10-10 ENCOUNTER — ANTICOAGULATION THERAPY VISIT (OUTPATIENT)
Dept: ANTICOAGULATION | Facility: CLINIC | Age: 37
End: 2017-10-10

## 2017-10-10 VITALS
DIASTOLIC BLOOD PRESSURE: 89 MMHG | HEIGHT: 63 IN | BODY MASS INDEX: 25.55 KG/M2 | SYSTOLIC BLOOD PRESSURE: 147 MMHG | HEART RATE: 84 BPM | WEIGHT: 144.2 LBS

## 2017-10-10 DIAGNOSIS — Z00.00 VISIT FOR PREVENTIVE HEALTH EXAMINATION: Primary | ICD-10-CM

## 2017-10-10 DIAGNOSIS — D68.52 PROTHROMBIN GENE MUTATION (H): ICD-10-CM

## 2017-10-10 DIAGNOSIS — Z13.1 SCREENING FOR DIABETES MELLITUS: ICD-10-CM

## 2017-10-10 DIAGNOSIS — Z79.01 LONG-TERM (CURRENT) USE OF ANTICOAGULANTS: ICD-10-CM

## 2017-10-10 DIAGNOSIS — Z13.220 SCREENING FOR LIPID DISORDERS: ICD-10-CM

## 2017-10-10 DIAGNOSIS — Z13.29 SCREENING FOR THYROID DISORDER: ICD-10-CM

## 2017-10-10 LAB
CHOLEST SERPL-MCNC: 202 MG/DL
HBA1C MFR BLD: 5 % (ref 4.3–6)
HDLC SERPL-MCNC: 79 MG/DL
INR PPP: 2.1 (ref 0.86–1.14)
LDLC SERPL CALC-MCNC: 114 MG/DL
NONHDLC SERPL-MCNC: 123 MG/DL
TRIGL SERPL-MCNC: 46 MG/DL
TSH SERPL DL<=0.005 MIU/L-ACNC: 1.73 MU/L (ref 0.4–4)

## 2017-10-10 PROCEDURE — 84443 ASSAY THYROID STIM HORMONE: CPT | Performed by: OBSTETRICS & GYNECOLOGY

## 2017-10-10 PROCEDURE — 99212 OFFICE O/P EST SF 10 MIN: CPT | Mod: ZF

## 2017-10-10 PROCEDURE — 83036 HEMOGLOBIN GLYCOSYLATED A1C: CPT | Performed by: OBSTETRICS & GYNECOLOGY

## 2017-10-10 PROCEDURE — 85610 PROTHROMBIN TIME: CPT | Performed by: OBSTETRICS & GYNECOLOGY

## 2017-10-10 PROCEDURE — 36415 COLL VENOUS BLD VENIPUNCTURE: CPT | Performed by: OBSTETRICS & GYNECOLOGY

## 2017-10-10 PROCEDURE — 80061 LIPID PANEL: CPT | Performed by: OBSTETRICS & GYNECOLOGY

## 2017-10-10 ASSESSMENT — PAIN SCALES - GENERAL: PAINLEVEL: NO PAIN (0)

## 2017-10-10 NOTE — MR AVS SNAPSHOT
After Visit Summary   10/10/2017    Stacey Maki    MRN: [id number]           Patient Information     Date Of Birth          1980        Visit Information        Provider Department      10/10/2017 9:00 AM Agnes Hernandez MD [facility]        Today's Diagnoses     Visit for preventive health examination    -  1    Screening for lipid disorders        Screening for thyroid disorder        Screening for diabetes mellitus        Long-term (current) use of anticoagulants        Prothrombin gene mutation (H)          Care Instructions      PREVENTIVE HEALTH RECOMMENDATIONS:   Most women need a yearly breast and pelvic exam.    A PAP screen, a test done DURING a pelvic exam, is NO longer recommended yearly.    March 2013, screening guidelines recommended by ACOG for PAP screen are:    1) First pap at age 21.    2) Pap every 3 years until age 30.    3) After age 30, pap every 3 years or Pap with HR HPV screen every 5 years until age 65.  4) Women do NOT need a vaginal Pap screen after a hysterectomy (surgical removal of the uterus) when they have not had cancer.    Exceptions:  1) Yearly pap if HIV+ or immunosuppressed secondary to organ transplant  2) PALOMO II-III continue routine screening for 20 years.    I encourage you continue looking for opportunities to choose a healthy lifestyle:       * Choose to eat a heart healthy diet. Check out the FOOD PLATE guidelines at: http://www.choosemyplate.gov/ for helpful hints on weight and cholesterol management.  Balance your caloric intake with exercise to maintain a BMI in the 22 to 26 range. For bone health: Eat calcium-rich foods like yogurt, broccoli or take chewable calcium pills (500 to 600 mg) twice a day with food.       * Exercise for at least an average of 30 minutes a day, 5 days of the week. This will help you control your weight, release stress, and help prevent disease.      * Take a Vitamin D3 supplement daily fall  through spring and during summer unless you uflu31-73' full body sun exposure to skin without sunscreen.      * DO wear sunscreen to prevent skin cancer after the first 15-30 minutes.      * Identify stressors in your life, find ways to release the stress, and, make time for yourself. PLEASE ask for help if mood changes last longer than two weeks.     * Limit alcohol to one drink per day.  No smoking.  Avoid second hand smoke. If you smoke, ask for help to stop.       *  If you are in a sexual relationship, talk with your partner about possible infection risks and take action to protect yourself from exposure to a sexual infection.    Please request an infection screen for STIs (sexually transmitted infections) if you are less than age 26 OR believe that you may be at risk.     Get a flu shot each year. Get a tetanus shot every 10 years. EVERYONE needs a pertussis (Whooping cough) booster.    See your dentist twice a year for an exam and preventive care cleaning.     Consider the following screen tests:    1) cholesterol test every 5 years.     2) yearly mammogram after age 40 unless you have identified risks.    3) colonoscopy every 10 years after age 50 unless you have identified risks.    4) diabetes blood test screening if you are at risk for diabetes.      Additional information that you may also find helpful:  The Internet now gives us access to LOTS of information -- some of it helpful, research documented and also plenty of harmful, anecdotal information that may not pertain to your situtaion. Consider visiting the following websites for accurate health information:    www.vitamindcouncil.org/ : Info and ongoing research re Vitamin D    www.fairview.org : Up to date and easily searchable information on multiple topics.    www.medlineplus.gov : medication info, interactive tutorials, watch real surgeries online    www.cdc.gov : public health info, travel advisories, epidemics (H1N1)    www.jan/std.org:  current research re diagnosis, treatment and prevention of sexually contacted infections.    www.health.state.[place] : [place] dept of heat, public health issues in [place], N1N1    www.familydoctor.org : good info from the Academy of Family Physicians                Follow-ups after your visit        Follow-up notes from your care team     Return in 1 year (on 10/10/2018) for Preventative Health Visit.      Your next 10 appointments already scheduled     Nov 20, 2017  1:00 PM CST   (Arrive by 12:45 PM)   RETURN CLOTTING DISORDER with Danny Clark MD   [facility] ([facility] [place])    [address]  [address] [place] [address] [phone]              Who to contact     Please call your clinic at [phone] to:    Ask questions about your health    Make or cancel appointments    Discuss your medicines    Learn about your test results    Speak to your doctor   If you have compliments or concerns about an experience at your clinic, or if you wish to file a complaint, please contact [facility] Patient Relations at [phone] or email us at [email or web address]         Additional Information About Your Visit        [employer] Information     [employer] gives you secure access to your electronic health record. If you see a primary care provider, you can also send messages to your care team and make appointments. If you have questions, please call your primary care clinic.  If you do not have a primary care provider, please call [phone] and they will assist you.      [employer] is an electronic gateway that provides easy, online access to your medical records. With [employer], you can request a clinic appointment, read your test results, renew a prescription or communicate with your care team.     To access your existing account, please contact your [facility] or call [phone] for assistance.       "  Care EveryWhere ID     This is your Care EveryWhere ID. This could be used by other organizations to access your [place] medical records  [id number]        Your Vitals Were     Pulse Height Last Period Breastfeeding? BMI (Body Mass Index)       84 1.6 m (5' 3\") 09/24/2017 (Exact Date) No 25.54 kg/m2        Blood Pressure from Last 3 Encounters:   10/10/17 147/89   10/06/16 126/89   06/28/16 (!) 134/91    Weight from Last 3 Encounters:   10/10/17 65.4 kg (144 lb 3.2 oz)   10/06/16 62.9 kg (138 lb 11.2 oz)   06/28/16 61.7 kg (136 lb)              We Performed the Following     Hgb A1c     INR     Lipid Profile     TSH with free T4 reflex        Primary Care Provider Office Phone # Fax #    Ulices Guevara [phone] [address] [address]        Equal Access to Services     [facility]: Hadii aad ku hadasho Soomaali, waaxda luqadaha, qaybta kaalmada adeegyada, waxay idiin haymaxwelln walter jones . So [facility] [phone].    ATENCIÓN: Si habla español, tiene a santos disposición servicios gratuitos de asistencia lingüística. Llame al [phone].    We comply with applicable federal civil rights laws and [place] laws. We do not discriminate on the basis of race, color, national origin, age, disability, sex, sexual orientation, or gender identity.            Thank you!     Thank you for choosing [facility]  for your care. Our goal is always to provide you with excellent care. Hearing back from our patients is one way we can continue to improve our services. Please take a few minutes to complete the written survey that you may receive in the mail after your visit with us. Thank you!             Your Updated Medication List - Protect others around you: Learn how to safely use, store and throw away your medicines at www.disposemymeds.org.          This list is accurate as of: 10/10/17 11:59 PM.  Always use your most recent med list.                "    Brand Name Dispense Instructions for use Diagnosis    ACETAMIN 500 MG tablet   Generic drug:  acetaminophen      Take 2 tablets by mouth every 6 hours as needed.        FLUoxetine 10 MG capsule    PROZAC    30 capsule    Take 2 capsules (20 mg) by mouth daily Starting with ovulation through first day of menses    PMDD (premenstrual dysphoric disorder)       warfarin 2 MG tablet    COUMADIN    65 tablet    Take 6mgs on Wednesdays and 4mgs on all other days or  as directed by anticoagulation clinic.    Deep vein thrombosis (H)

## 2017-10-10 NOTE — MR AVS SNAPSHOT
Stacey Combs Glynn   10/10/2017   Anticoagulation Therapy Visit    Description:  37 year old female   Provider:  Praveena Blackwood, RN   Department:  [facility]           INR as of 10/10/2017     Today's INR 2.10      Anticoagulation Summary as of 10/10/2017     INR goal 2.0-3.0   Today's INR 2.10   Full instructions 6 mg on Wed; 4 mg all other days   Next INR check 11/20/2017    Indications   Long-term (current) use of anticoagulants [Z79.01] [Z79.01]  Prothrombin gene mutation (H) [D68.52]         October 2017 Details    Sun Mon Tue Wed Thu Fri Sat     1               2               3               4               5               6               7                 8               9               10      4 mg   See details      11      6 mg         12      4 mg         13      4 mg         14      4 mg           15      4 mg         16      4 mg         17      4 mg         18      6 mg         19      4 mg         20      4 mg         21      4 mg           22      4 mg         23      4 mg         24      4 mg         25      6 mg         26      4 mg         27      4 mg         28      4 mg           29      4 mg         30      4 mg         31      4 mg              Date Details   10/10 This INR check               How to take your warfarin dose     To take:  4 mg Take 2 of the 2 mg tablets.    To take:  6 mg Take 3 of the 2 mg tablets.           November 2017 Details    Sun Mon Tue Wed Thu Fri Sat        1      6 mg         2      4 mg         3      4 mg         4      4 mg           5      4 mg         6      4 mg         7      4 mg         8      6 mg         9      4 mg         10      4 mg         11      4 mg           12      4 mg         13      4 mg         14      4 mg         15      6 mg         16      4 mg         17      4 mg         18      4 mg           19      4 mg         20            21               22               23               24               25                 26                27               28               29               30                  Date Details   No additional details    Date of next INR:  11/20/2017         How to take your warfarin dose     To take:  4 mg Take 2 of the 2 mg tablets.    To take:  6 mg Take 3 of the 2 mg tablets.

## 2017-10-10 NOTE — LETTER
10/10/2017       RE: Stacey Maki  [address] [place] [address]     Dear Colleague,    Thank you for referring your patient, Stacey Maki, to the [facility] at [facility]. Please see a copy of my visit note below.        Progress Note    SUBJECTIVE:  Stacey Maki is an 37 year old, , who requests an Annual Preventive Exam.     Concerns today include: PMS symptoms. Started taking luteal phase prozac last year. Has significantly improved symptoms, feels much more relaxed, able to enjoy children more. Is drinking less alcohol to deal with stress of parenting. Is very fatigued with taking medication however. Is only taking 10mg in am, notices daytime sleepiness with days when taking.     Is also struggling with diet/exercise. Had lost weight, exercising regularly and fell off plan.     Menstrual History:  Menstrual History 10/6/2016 10/10/2017 10/10/2017   LAST MENSTRUAL PERIOD - 2017 -   Menarche age 11 - 11   Period Cycle (Days) 24-30 days varies  - Q 28 days   Period Duration (Days) 5 days - 6 days   Method of Contraception None - None   Period Pattern Regular - -   Menstrual Flow Heavy;Light - Heavy   Menstrual Control Tampon;Thin pad - Tampon   Dysmenorrhea Mild - None   PMS Symptoms Cramping - -   Reviewed Today Yes - Yes       Last    Lab Results   Component Value Date    PAP NIL 2014     History of abnormal Pap smear: NO - age 30-65 PAP every 5 years with negative HPV co-testing recommended    Last No results found for: HPV16  Last No results found for: HPV18  Last No results found for: HRHPV    Mammogram current: not applicable  Last Mammogram:   Mammo Diagnostic  Digital (bilateral)    Result Date: 2014  Narrative: Examination: Bilateral digital diagnostic mammography with computer aided detection and left breast ultrasound Comparison: CT chest 2011 History: Left breast lesion. BREAST DENSITY:  Heterogeneously dense Findings: Mammograms are negative. No suspicious finding in either breast including in the left breast lower outer quadrant in the region of skin abnormality. Ultrasound of the left breast 4:00 position 5 cm from the nipple demonstrates a nonspecific 5 mm inflammatory skin lesion. Ultrasound was performed by the radiologist and the sonographer.     Us Breast Left    Result Date: 2014  Narrative: Examination: Bilateral digital diagnostic mammography with computer aided detection and left breast ultrasound Comparison: CT chest 2011 History: Left breast lesion. BREAST DENSITY: Heterogeneously dense Findings: Mammograms are negative. No suspicious finding in either breast including in the left breast lower outer quadrant in the region of skin abnormality. Ultrasound of the left breast 4:00 position 5 cm from the nipple demonstrates a nonspecific 5 mm inflammatory skin lesion. Ultrasound was performed by the radiologist and the sonographer.        Last Colonoscopy:  No results found for this or any previous visit.      HISTORY:    Current Outpatient Prescriptions on File Prior to Visit:  FLUoxetine (PROZAC) 10 MG capsule Take 2 capsules (20 mg) by mouth daily Starting with ovulation through first day of menses   warfarin (COUMADIN) 2 MG tablet Take 6mgs on  and 4mgs on all other days or  as directed by anticoagulation clinic.   acetaminophen (ACETAMIN) 500 MG tablet Take 2 tablets by mouth every 6 hours as needed.     No current facility-administered medications on file prior to visit.   Allergies   Allergen Reactions     Benzoyl Peroxide Swelling     Swollen eyelids     Immunization History   Administered Date(s) Administered     Tdap (Adacel,Boostrix) 2011       Obstetric History       T2      L2     SAB0   TAB0   Ectopic0   Multiple0   Live Births2      Past Medical History:   Diagnosis Date     Arteriovenous malformation      Asthma      Chronic cerebral  venous sinus thrombosis     diagnosed in , 2009 5wk preg.      Heart disease     mother     Hypertension 2016    mother     Idiopathic intracranial hypertension      Papilledema      Prothrombin gene mutation (H)     heterozygous type     Tension headache      Past Surgical History:   Procedure Laterality Date     cerebral angiogram      to discuss management of neural- based fistula      SECTION       ENT SURGERY      adnoids removed/tubes in ears at age 3     GYN SURGERY      C section x 2     IR INTRACRANIAL EMBOLIZATION RIGHT  2011     MRI last Nov.      NO HISTORY OF SURGERY  3/20/14    derm     VASCULAR SURGERY      fistula repaired in brain     Family History   Problem Relation Age of Onset     CEREBROVASCULAR DISEASE Paternal Grandmother      Cancer - colorectal Paternal Grandfather      Colon Cancer Paternal Grandfather      Alcohol/Drug Father      Substance Abuse Father      Alcohol/Drug Maternal Grandmother      CEREBROVASCULAR DISEASE Other      paternal uncle     Anxiety Disorder Mother      Depression Mother      Obesity Mother      CANCER No family hx of      no skin cancer     Social History     Social History     Marital status:      Spouse name: Edson     Number of children: 2     Years of education: 18     Occupational History      [employer]          Social History Main Topics     Smoking status: Never Smoker     Smokeless tobacco: Never Used     Alcohol use Yes      Comment: 2-4 drinks per month     Drug use: No     Sexual activity: Yes     Partners: Male     Birth control/ protection: Male Surgical      Comment: vasectomy         Review of Systems     Constitutional:  Positive for fever, weight gain, fatigue, night sweats, recent stressors and increased energy. Negative for chills, weight loss, decreased appetite, height loss, post-operative complications, incisional pain, hallucinations, hyperactivity and confused.   HENT:  Positive  for ear pain, tinnitus, trouble swallowing, hoarse voice, mouth sores, sore throat, ear discharge, tooth pain, gum tenderness, bleeding gums and sinus congestion. Negative for hearing loss, nosebleeds, taste disturbance, smell disturbance, hearing aid, dry mouth, sinus pain and neck mass.    Eyes:  Negative for double vision, pain, redness, eye pain, decreased vision, eye watering, eye bulging, eye dryness, flashing lights, spots, floaters, strabismus, tunnel vision, jaundice and eye irritation.   Respiratory:   Negative for cough, hemoptysis, sputum production, shortness of breath, wheezing, sleep disturbances due to breathing, snores loudly, respiratory pain, dyspnea on exertion, cough disturbing sleep and postural dyspnea.    Cardiovascular:  Negative for chest pain, dyspnea on exertion, palpitations, orthopnea, claudication, leg swelling, fingers/toes turn blue, hypertension, hypotension, syncope, history of heart murmur, chest pain on exertion, chest pain at rest, pacemaker, few scattered varicosities, leg pain, sleep disturbances due to breathing, tachycardia, light-headedness, exercise intolerance and edema.   Gastrointestinal:  Positive for diarrhea, constipation, bloating and hemorrhoids. Negative for heartburn, nausea, vomiting, abdominal pain, blood in stool, melena, rectal pain, bowel incontinence, jaundice, rectal bleeding, coffee ground emesis and change in stool.   Genitourinary:  Negative for bladder incontinence, dysuria, urgency, hematuria, flank pain, vaginal discharge, difficulty urinating, genital sores, dyspareunia, decreased libido, nocturia, voiding less frequently, arousal difficulty, abnormal vaginal bleeding, excessive menstruation, menstrual changes, hot flashes, vaginal dryness and postmenopausal bleeding.   Musculoskeletal:  Positive for myalgias, back pain, arthralgias, stiffness, muscle cramps and neck pain. Negative for joint swelling, bone pain, muscle weakness and fracture.   Skin:  " Negative for nail changes, itching, poor wound healing, rash, hair changes, skin changes, acne, warts, poor wound healing, scarring, flaky skin, Raynaud's phenomenon, sensitivity to sunlight and skin thickening.   Neurological:  Negative for dizziness, tingling, tremors, speech change, seizures, loss of consciousness, weakness, light-headedness, numbness, headaches, disturbances in coordination, extremity numbness, memory loss, difficulty walking and paralysis.   Endo/Heme:  Negative for anemia, swollen glands and bruises/bleeds easily.   Psychiatric/Behavioral:  Positive for depression, decreased concentration and mood swings. Negative for hallucinations, memory loss and panic attacks.    Breast:  Negative for breast discharge, breast mass, breast pain and nipple retraction.   Endocrine:  Positive for polyphagia.Negative for altered temperature regulation, polydipsia, unwanted hair growth and change in facial hair.    [unfilled]  PHQ-9 SCORE 9/30/2014 10/5/2015 10/6/2016   Total Score 2 - -   Total Score - 2 8     NIKA-7 SCORE 10/5/2017   Total Score 10 (moderate anxiety)   Total Score 10         EXAM:  Blood pressure 147/89, pulse 84, height 1.6 m (5' 3\"), weight 65.4 kg (144 lb 3.2 oz), last menstrual period 09/24/2017, not currently breastfeeding. Body mass index is 25.54 kg/(m^2).  General - pleasant female in no acute distress.  Skin - no suspicious lesions or rashes  EENT-  PERRLA, euthyroid with out palpable nodules  Neck - supple without lymphadenopathy.  Lungs - clear to auscultation bilaterally.  Heart - regular rate and rhythm without murmur.  Abdomen - soft, nontender, nondistended, no masses or organomegaly noted.  Musculoskeletal - no gross deformities.  Neurological - normal strength, sensation, and mental status.    Breast Exam:  Breast: Without visible skin changes. No dimpling or lesions seen.   Breasts supple, non-tender with palpation, no dominant mass, nodularity, or nipple discharge noted " bilaterally. Axillary nodes negative.      Pelvic Exam:  EG/BUS: Normal genital architecture without lesions, erythema or abnormal secretions Bartholin's, Urethra, [place]'s normal   Urethral meatus: normal   Urethra: no masses, tenderness, or scarring   Bladder: no masses or tenderness   Vagina: moist, pink, rugae with creamy, white and odorless  secretions  Cervix: no lesions  Uterus: anteverted,   Adnexa: Within normal limits and No masses, nodularity, tenderness  Rectum:anus normal       ASSESSMENT:  Encounter Diagnoses   Name Primary?     Screening for lipid disorders Yes     Screening for thyroid disorder      Screening for diabetes mellitus      Long-term (current) use of anticoagulants      Prothrombin gene mutation (H)      Visit for preventive health examination         PLAN:   Orders Placed This Encounter   Procedures     Lipid Profile     TSH with free T4 reflex     Hgb A1c     INR   Will try switching prozac to evening to see if helps with fatigue. Recommend follow up with psychology for strategies for anxiety.     Additional teaching done at this visit regarding exercise, mental health and weight/diet.    Return to clinic in one year.  Follow-up as needed.      Again, thank you for allowing me to participate in the care of your patient.      Sincerely,    Agnes Hernandez MD

## 2017-10-10 NOTE — NURSING NOTE
Chief Complaint   Patient presents with     Physical     Would like to discuss meds and bloating       See CJ Giles 10/10/2017

## 2017-10-10 NOTE — PROGRESS NOTES
ANTICOAGULATION FOLLOW-UP CLINIC VISIT    Patient Name:  Stacey Maki  Date:  10/10/2017  Contact Type:  Telephone    SUBJECTIVE:        OBJECTIVE    INR   Date Value Ref Range Status   10/10/2017 2.10 (H) 0.86 - 1.14 Final       ASSESSMENT / PLAN  INR assessment THER    Recheck INR In: 6 WEEKS    INR Location Clinic      Anticoagulation Summary as of 10/10/2017     INR goal 2.0-3.0   Today's INR 2.10   Maintenance plan 6 mg (2 mg x 3) on Wed; 4 mg (2 mg x 2) all other days   Full instructions 6 mg on Wed; 4 mg all other days   Weekly total 30 mg   Plan last modified Karina Khoury RN (5/2/2017)   Next INR check 11/20/2017   Priority INR   Target end date     Indications   Long-term (current) use of anticoagulants [Z79.01] [Z79.01]  Prothrombin gene mutation (H) [D68.52]         Anticoagulation Episode Summary     INR check location     Preferred lab     Send INR reminders to [facility]    Comments Patient has 2 mg Tablets  Patient contact number [phone]  HIPAA INFO OK to leave messages on home or cell phone    OK to speak byron Potts      Anticoagulation Care Providers     Provider Role Specialty Phone number    Danny Clark MD Responsible Hematology [phone]            See the Encounter Report to view Anticoagulation Flowsheet and Dosing Calendar (Go to Encounters tab in chart review, and find the Anticoagulation Therapy Visit)    Left message for patient with results and dosing recommendations. Asked patient to call back to report any missed doses, falls, signs and symptoms of bleeding or clotting, any changes in health, medication, or diet. Asked patient to call back with any questions or concerns.     Praveena Blackwood RN

## 2017-10-12 ASSESSMENT — ENCOUNTER SYMPTOMS
SNORES LOUDLY: 0
CHILLS: 0
NECK MASS: 0
WHEEZING: 0
BLOATING: 1
RESPIRATORY PAIN: 0
FATIGUE: 1
VOMITING: 0
HEARTBURN: 0
SMELL DISTURBANCE: 0
WEIGHT GAIN: 1
HYPERTENSION: 0
ABDOMINAL PAIN: 0
SYNCOPE: 0
SINUS CONGESTION: 1
DYSPNEA ON EXERTION: 0
NAUSEA: 0
BACK PAIN: 1
DIFFICULTY URINATING: 0
DYSURIA: 0
SEIZURES: 0
BOWEL INCONTINENCE: 0
POLYDIPSIA: 0
NUMBNESS: 0
DECREASED LIBIDO: 0
BRUISES/BLEEDS EASILY: 0
PALPITATIONS: 0
EXERCISE INTOLERANCE: 0
EXTREMITY NUMBNESS: 0
RECTAL PAIN: 0
WEIGHT LOSS: 0
HEADACHES: 0
EYE PAIN: 0
ARTHRALGIAS: 1
LEG PAIN: 0
JOINT SWELLING: 0
SORE THROAT: 1
HEMOPTYSIS: 0
POSTURAL DYSPNEA: 0
PARALYSIS: 0
SPUTUM PRODUCTION: 0
WEAKNESS: 0
POLYPHAGIA: 1
NERVOUS/ANXIOUS: 1
RECTAL BLEEDING: 0
TASTE DISTURBANCE: 0
NAIL CHANGES: 0
NECK PAIN: 1
TINGLING: 0
EYE REDNESS: 0
FEVER: 1
PANIC: 0
FLANK PAIN: 0
HYPOTENSION: 0
EYE WATERING: 0
INCREASED ENERGY: 1
POOR WOUND HEALING: 0
NIGHT SWEATS: 1
DIARRHEA: 1
ALTERED TEMPERATURE REGULATION: 0
DOUBLE VISION: 0
BLOOD IN STOOL: 0
DISTURBANCES IN COORDINATION: 0
HOARSE VOICE: 1
ORTHOPNEA: 0
BREAST MASS: 0
DECREASED APPETITE: 0
TREMORS: 0
COUGH: 0
TROUBLE SWALLOWING: 1
SHORTNESS OF BREATH: 0
SLEEP DISTURBANCES DUE TO BREATHING: 0
CONSTIPATION: 1
MUSCLE WEAKNESS: 0
CLAUDICATION: 0
HOT FLASHES: 0
DECREASED CONCENTRATION: 1
SWOLLEN GLANDS: 0
EYE IRRITATION: 0
STIFFNESS: 1
BREAST PAIN: 0
SKIN CHANGES: 0
LIGHT-HEADEDNESS: 0
HEMATURIA: 0
DIZZINESS: 0
DEPRESSION: 1
LEG SWELLING: 0
INSOMNIA: 1
MEMORY LOSS: 0
JAUNDICE: 0
HALLUCINATIONS: 0
SPEECH CHANGE: 0
SINUS PAIN: 0
LOSS OF CONSCIOUSNESS: 0
TACHYCARDIA: 0
COUGH DISTURBING SLEEP: 0
MYALGIAS: 1
MUSCLE CRAMPS: 1

## 2017-10-12 NOTE — PATIENT INSTRUCTIONS

## 2017-10-12 NOTE — PROGRESS NOTES
Progress Note    SUBJECTIVE:  Stacey Maki is an 37 year old, , who requests an Annual Preventive Exam.     Concerns today include: PMS symptoms. Started taking luteal phase prozac last year. Has significantly improved symptoms, feels much more relaxed, able to enjoy children more. Is drinking less alcohol to deal with stress of parenting. Is very fatigued with taking medication however. Is only taking 10mg in am, notices daytime sleepiness with days when taking.     Is also struggling with diet/exercise. Had lost weight, exercising regularly and fell off plan.     Menstrual History:  Menstrual History 10/6/2016 10/10/2017 10/10/2017   LAST MENSTRUAL PERIOD - 2017 -   Menarche age 11 - 11   Period Cycle (Days) 24-30 days varies  - Q 28 days   Period Duration (Days) 5 days - 6 days   Method of Contraception None - None   Period Pattern Regular - -   Menstrual Flow Heavy;Light - Heavy   Menstrual Control Tampon;Thin pad - Tampon   Dysmenorrhea Mild - None   PMS Symptoms Cramping - -   Reviewed Today Yes - Yes       Last    Lab Results   Component Value Date    PAP NIL 2014     History of abnormal Pap smear: NO - age 30-65 PAP every 5 years with negative HPV co-testing recommended    Last No results found for: HPV16  Last No results found for: HPV18  Last No results found for: HRHPV    Mammogram current: not applicable  Last Mammogram:   Mammo Diagnostic  Digital (bilateral)    Result Date: 2014  Narrative: Examination: Bilateral digital diagnostic mammography with computer aided detection and left breast ultrasound Comparison: CT chest 2011 History: Left breast lesion. BREAST DENSITY: Heterogeneously dense Findings: Mammograms are negative. No suspicious finding in either breast including in the left breast lower outer quadrant in the region of skin abnormality. Ultrasound of the left breast 4:00 position 5 cm from the nipple demonstrates a nonspecific 5 mm inflammatory skin lesion.  Ultrasound was performed by the radiologist and the sonographer.     Us Breast Left    Result Date: 2014  Narrative: Examination: Bilateral digital diagnostic mammography with computer aided detection and left breast ultrasound Comparison: CT chest 2011 History: Left breast lesion. BREAST DENSITY: Heterogeneously dense Findings: Mammograms are negative. No suspicious finding in either breast including in the left breast lower outer quadrant in the region of skin abnormality. Ultrasound of the left breast 4:00 position 5 cm from the nipple demonstrates a nonspecific 5 mm inflammatory skin lesion. Ultrasound was performed by the radiologist and the sonographer.        Last Colonoscopy:  No results found for this or any previous visit.      HISTORY:    Current Outpatient Prescriptions on File Prior to Visit:  FLUoxetine (PROZAC) 10 MG capsule Take 2 capsules (20 mg) by mouth daily Starting with ovulation through first day of menses   warfarin (COUMADIN) 2 MG tablet Take 6mgs on  and 4mgs on all other days or  as directed by anticoagulation clinic.   acetaminophen (ACETAMIN) 500 MG tablet Take 2 tablets by mouth every 6 hours as needed.     No current facility-administered medications on file prior to visit.   Allergies   Allergen Reactions     Benzoyl Peroxide Swelling     Swollen eyelids     Immunization History   Administered Date(s) Administered     Tdap (Adacel,Boostrix) 2011       Obstetric History       T2      L2     SAB0   TAB0   Ectopic0   Multiple0   Live Births2      Past Medical History:   Diagnosis Date     Arteriovenous malformation      Asthma      Chronic cerebral venous sinus thrombosis     diagnosed in , 2nd  5wk preg.      Heart disease     mother     Hypertension 2016    mother     Idiopathic intracranial hypertension      Papilledema      Prothrombin gene mutation (H)     heterozygous type     Tension headache      Past Surgical History:   Procedure  Laterality Date     cerebral angiogram      to discuss management of neural- based fistula      SECTION       ENT SURGERY  1983    adnoids removed/tubes in ears at age 3     GYN SURGERY      C section x 2     IR INTRACRANIAL EMBOLIZATION RIGHT  2011     MRI last Nov.      NO HISTORY OF SURGERY  3/20/14    derm     VASCULAR SURGERY      fistula repaired in brain     Family History   Problem Relation Age of Onset     CEREBROVASCULAR DISEASE Paternal Grandmother      Cancer - colorectal Paternal Grandfather      Colon Cancer Paternal Grandfather      Alcohol/Drug Father      Substance Abuse Father      Alcohol/Drug Maternal Grandmother      CEREBROVASCULAR DISEASE Other      paternal uncle     Anxiety Disorder Mother      Depression Mother      Obesity Mother      CANCER No family hx of      no skin cancer     Social History     Social History     Marital status:      Spouse name: Edson     Number of children: 2     Years of education: 18     Occupational History      [employer]          Social History Main Topics     Smoking status: Never Smoker     Smokeless tobacco: Never Used     Alcohol use Yes      Comment: 2-4 drinks per month     Drug use: No     Sexual activity: Yes     Partners: Male     Birth control/ protection: Male Surgical      Comment: vasectomy         Review of Systems     Constitutional:  Positive for fever, weight gain, fatigue, night sweats, recent stressors and increased energy. Negative for chills, weight loss, decreased appetite, height loss, post-operative complications, incisional pain, hallucinations, hyperactivity and confused.   HENT:  Positive for ear pain, tinnitus, trouble swallowing, hoarse voice, mouth sores, sore throat, ear discharge, tooth pain, gum tenderness, bleeding gums and sinus congestion. Negative for hearing loss, nosebleeds, taste disturbance, smell disturbance, hearing aid, dry mouth, sinus pain and neck mass.    Eyes:   Negative for double vision, pain, redness, eye pain, decreased vision, eye watering, eye bulging, eye dryness, flashing lights, spots, floaters, strabismus, tunnel vision, jaundice and eye irritation.   Respiratory:   Negative for cough, hemoptysis, sputum production, shortness of breath, wheezing, sleep disturbances due to breathing, snores loudly, respiratory pain, dyspnea on exertion, cough disturbing sleep and postural dyspnea.    Cardiovascular:  Negative for chest pain, dyspnea on exertion, palpitations, orthopnea, claudication, leg swelling, fingers/toes turn blue, hypertension, hypotension, syncope, history of heart murmur, chest pain on exertion, chest pain at rest, pacemaker, few scattered varicosities, leg pain, sleep disturbances due to breathing, tachycardia, light-headedness, exercise intolerance and edema.   Gastrointestinal:  Positive for diarrhea, constipation, bloating and hemorrhoids. Negative for heartburn, nausea, vomiting, abdominal pain, blood in stool, melena, rectal pain, bowel incontinence, jaundice, rectal bleeding, coffee ground emesis and change in stool.   Genitourinary:  Negative for bladder incontinence, dysuria, urgency, hematuria, flank pain, vaginal discharge, difficulty urinating, genital sores, dyspareunia, decreased libido, nocturia, voiding less frequently, arousal difficulty, abnormal vaginal bleeding, excessive menstruation, menstrual changes, hot flashes, vaginal dryness and postmenopausal bleeding.   Musculoskeletal:  Positive for myalgias, back pain, arthralgias, stiffness, muscle cramps and neck pain. Negative for joint swelling, bone pain, muscle weakness and fracture.   Skin:  Negative for nail changes, itching, poor wound healing, rash, hair changes, skin changes, acne, warts, poor wound healing, scarring, flaky skin, Raynaud's phenomenon, sensitivity to sunlight and skin thickening.   Neurological:  Negative for dizziness, tingling, tremors, speech change, seizures,  "loss of consciousness, weakness, light-headedness, numbness, headaches, disturbances in coordination, extremity numbness, memory loss, difficulty walking and paralysis.   Endo/Heme:  Negative for anemia, swollen glands and bruises/bleeds easily.   Psychiatric/Behavioral:  Positive for depression, decreased concentration and mood swings. Negative for hallucinations, memory loss and panic attacks.    Breast:  Negative for breast discharge, breast mass, breast pain and nipple retraction.   Endocrine:  Positive for polyphagia.Negative for altered temperature regulation, polydipsia, unwanted hair growth and change in facial hair.    [unfilled]  PHQ-9 SCORE 9/30/2014 10/5/2015 10/6/2016   Total Score 2 - -   Total Score - 2 8     NIKA-7 SCORE 10/5/2017   Total Score 10 (moderate anxiety)   Total Score 10         EXAM:  Blood pressure 147/89, pulse 84, height 1.6 m (5' 3\"), weight 65.4 kg (144 lb 3.2 oz), last menstrual period 09/24/2017, not currently breastfeeding. Body mass index is 25.54 kg/(m^2).  General - pleasant female in no acute distress.  Skin - no suspicious lesions or rashes  EENT-  PERRLA, euthyroid with out palpable nodules  Neck - supple without lymphadenopathy.  Lungs - clear to auscultation bilaterally.  Heart - regular rate and rhythm without murmur.  Abdomen - soft, nontender, nondistended, no masses or organomegaly noted.  Musculoskeletal - no gross deformities.  Neurological - normal strength, sensation, and mental status.    Breast Exam:  Breast: Without visible skin changes. No dimpling or lesions seen.   Breasts supple, non-tender with palpation, no dominant mass, nodularity, or nipple discharge noted bilaterally. Axillary nodes negative.      Pelvic Exam:  EG/BUS: Normal genital architecture without lesions, erythema or abnormal secretions Bartholin's, Urethra, [place]'s normal   Urethral meatus: normal   Urethra: no masses, tenderness, or scarring   Bladder: no masses or tenderness   Vagina: moist, " pink, rugae with creamy, white and odorless  secretions  Cervix: no lesions  Uterus: anteverted,   Adnexa: Within normal limits and No masses, nodularity, tenderness  Rectum:anus normal       ASSESSMENT:  Encounter Diagnoses   Name Primary?     Screening for lipid disorders Yes     Screening for thyroid disorder      Screening for diabetes mellitus      Long-term (current) use of anticoagulants      Prothrombin gene mutation (H)      Visit for preventive health examination         PLAN:   Orders Placed This Encounter   Procedures     Lipid Profile     TSH with free T4 reflex     Hgb A1c     INR   Will try switching prozac to evening to see if helps with fatigue. Recommend follow up with psychology for strategies for anxiety.     Additional teaching done at this visit regarding exercise, mental health and weight/diet.    Return to clinic in one year.  Follow-up as needed.    Agnes Hernandez MD      Answers for HPI/ROS submitted by the patient on 10/5/2017   NIKA 7 TOTAL SCORE: 10  PHQ-2 Score: 2

## 2017-10-17 DIAGNOSIS — F32.81 PMDD (PREMENSTRUAL DYSPHORIC DISORDER): ICD-10-CM

## 2017-10-17 RX ORDER — FLUOXETINE 10 MG/1
20 CAPSULE ORAL DAILY
Qty: 90 CAPSULE | Refills: 3 | Status: SHIPPED | OUTPATIENT
Start: 2017-10-17 | End: 2018-11-03

## 2017-10-17 NOTE — TELEPHONE ENCOUNTER
Received refill request for fluoxetine.  Last in clinic 10/2017 for preventive annual and medication was discussed.    PMS symptoms. Started taking luteal phase prozac last year. Has significantly improved symptoms, feels much more relaxed, able to enjoy children more. Is drinking less alcohol to deal with stress of parenting. Is very fatigued with taking medication however. Is only taking 10mg in am, notices daytime sleepiness with days when taking. Refill sent

## 2017-12-02 ENCOUNTER — HEALTH MAINTENANCE LETTER (OUTPATIENT)
Age: 37
End: 2017-12-02

## 2018-02-15 ENCOUNTER — ANTICOAGULATION THERAPY VISIT (OUTPATIENT)
Dept: ANTICOAGULATION | Facility: CLINIC | Age: 38
End: 2018-02-15

## 2018-02-15 DIAGNOSIS — Z79.01 LONG-TERM (CURRENT) USE OF ANTICOAGULANTS: ICD-10-CM

## 2018-02-15 DIAGNOSIS — D68.52 PROTHROMBIN GENE MUTATION (H): ICD-10-CM

## 2018-02-15 NOTE — MR AVS SNAPSHOT
Stacey Maki   2/15/2018   Anticoagulation Therapy Visit    Description:  37 year old female   Provider:  Adam Koch, [facility]   Department:  [facility]           INR as of 2/15/2018     Today's INR No new INR was available at the time of this encounter.      Anticoagulation Summary as of 2/15/2018     INR goal 2.0-3.0   Today's INR No new INR was available at the time of this encounter.   Full instructions 6 mg on Wed; 4 mg all other days   Next INR check 2/19/2018    Indications   Long-term (current) use of anticoagulants [Z79.01] [Z79.01]  Prothrombin gene mutation (H) [D68.52]         February 2018 Details    Sun Mon Tue Wed Thu Fri Sat         1               2               3                 4               5               6               7               8               9               10                 11               12               13               14               15      4 mg   See details      16      4 mg         17      4 mg           18      4 mg         19            20               21               22               23               24                 25               26               27               28                   Date Details   02/15 This INR check       Date of next INR:  2/19/2018         How to take your warfarin dose     To take:  4 mg Take 2 of the 2 mg tablets.

## 2018-02-15 NOTE — PROGRESS NOTES
Left a message for patient to call us and cone if for lab work for he warfarin therapy as soon as possible.    Addendum 2/15/18 Stacey will have an INR done within the next couple of days.

## 2018-02-16 ENCOUNTER — HOSPITAL ENCOUNTER (OUTPATIENT)
Dept: LAB | Facility: CLINIC | Age: 38
Discharge: HOME | End: 2018-02-16
Attending: INTERNAL MEDICINE | Admitting: INTERNAL MEDICINE
Payer: COMMERCIAL

## 2018-02-16 DIAGNOSIS — D68.52 PROTHROMBIN GENE MUTATION (H): ICD-10-CM

## 2018-02-16 DIAGNOSIS — Z79.01 LONG-TERM (CURRENT) USE OF ANTICOAGULANTS: ICD-10-CM

## 2018-02-16 LAB — INR PPP: 4.74 (ref 0.86–1.14)

## 2018-02-16 PROCEDURE — 36415 COLL VENOUS BLD VENIPUNCTURE: CPT | Performed by: INTERNAL MEDICINE

## 2018-02-16 PROCEDURE — 85610 PROTHROMBIN TIME: CPT | Performed by: INTERNAL MEDICINE

## 2018-02-19 ENCOUNTER — ANTICOAGULATION THERAPY VISIT (OUTPATIENT)
Dept: ANTICOAGULATION | Facility: CLINIC | Age: 38
End: 2018-02-19

## 2018-02-19 DIAGNOSIS — Z79.01 LONG-TERM (CURRENT) USE OF ANTICOAGULANTS: ICD-10-CM

## 2018-02-19 DIAGNOSIS — D68.52 PROTHROMBIN GENE MUTATION (H): ICD-10-CM

## 2018-02-19 DIAGNOSIS — I82.409 DEEP VEIN THROMBOSIS (H): ICD-10-CM

## 2018-02-19 RX ORDER — WARFARIN SODIUM 2 MG/1
TABLET ORAL
Qty: 65 TABLET | Refills: 5 | Status: SHIPPED | OUTPATIENT
Start: 2018-02-19 | End: 2018-09-04

## 2018-02-19 NOTE — MR AVS SNAPSHOT
Stacey Combs Glynn   2/19/2018   Anticoagulation Therapy Visit    Description:  37 year old female   Provider:  Merced Cadet, RN   Department:  [facility]           INR as of 2/19/2018     Today's INR 4.74! (2/16/2018)      Anticoagulation Summary as of 2/19/2018     INR goal 2.0-3.0   Today's INR 4.74! (2/16/2018)   Full instructions 2/19: Hold; Otherwise 6 mg on Wed; 4 mg all other days   Next INR check 2/20/2018    Indications   Long-term (current) use of anticoagulants [Z79.01] [Z79.01]  Prothrombin gene mutation (H) [D68.52]         February 2018 Details    Sun Mon Tue Wed Thu Fri Sat         1               2               3                 4               5               6               7               8               9               10                 11               12               13               14               15               16               17                 18               19      Hold   See details      20 21               22               23               24                 25               26               27               28                   Date Details   02/19 This INR check       Date of next INR:  2/20/2018         How to take your warfarin dose     To take:  4 mg Take 2 of the 2 mg tablets.    Hold Do not take your warfarin dose. See the Details table to the right for additional instructions.

## 2018-02-19 NOTE — PROGRESS NOTES
ANTICOAGULATION FOLLOW-UP CLINIC VISIT    Patient Name:  Stacey Maki  Date:  2/19/2018  Contact Type:  Telephone    SUBJECTIVE:        OBJECTIVE    INR   Date Value Ref Range Status   02/16/2018 4.74 (H) 0.86 - 1.14 Final     Comment:     Results confirmed by repeat test       ASSESSMENT / PLAN  INR assessment SUPRA    Recheck INR In: 1 DAY    INR Location Clinic      Anticoagulation Summary as of 2/19/2018     INR goal 2.0-3.0   Today's INR 4.74! (2/16/2018)   Maintenance plan 6 mg (2 mg x 3) on Wed; 4 mg (2 mg x 2) all other days   Full instructions 2/19: Hold; Otherwise 6 mg on Wed; 4 mg all other days   Weekly total 30 mg   Plan last modified Karina Khoury RN (5/2/2017)   Next INR check 2/20/2018   Priority INR   Target end date     Indications   Long-term (current) use of anticoagulants [Z79.01] [Z79.01]  Prothrombin gene mutation (H) [D68.52]         Anticoagulation Episode Summary     INR check location     Preferred lab     Send INR reminders to [facility]    Comments Patient has 2 mg Tablets  Patient contact number [phone]  HIPAA INFO OK to leave messages on home or cell phone    OK to speak byron Potts      Anticoagulation Care Providers     Provider Role Specialty Phone number    Danny Clark MD Responsible Hematology [phone]            See the Encounter Report to view Anticoagulation Flowsheet and Dosing Calendar (Go to Encounters tab in chart review, and find the Anticoagulation Therapy Visit)    Left message for patient with results and dosing recommendations. Asked patient to call back to report any missed doses, falls, signs and symptoms of bleeding or clotting, any changes in health, medication, or diet. Asked patient to call back with any questions or concerns.    Recommended that Stacey hold her warfarin today if she has continued to take her maintenance dose of warfarin.  Asked that she call the [facility] so a plan can be made.  [employer] message also  sent.      Merced Cadet, RN             Addendum 2/20/18 Pt left a vm on clinics phone and reports that it is ok to leave a vm on her cell ph and that she is more likely to get back to us if it is urgent. Pt communicates that she is still taking 6mg W and 4mg ROW. The only change is on 2/13 pt took Valcyclovir X1 for a cold sore and she is thinking that is possibly the reason why her INR is high. Also reports that she is on Monostat. Writer did call and left a message for pt to continue with 4mg today and 6mg on Wed then to get another INR on Thursday 2/22. Praveena Blackwood RN

## 2018-02-22 ENCOUNTER — TELEPHONE (OUTPATIENT)
Dept: ONCOLOGY | Facility: CLINIC | Age: 38
End: 2018-02-22

## 2018-02-22 ENCOUNTER — HOSPITAL ENCOUNTER (OUTPATIENT)
Dept: LAB | Facility: CLINIC | Age: 38
Discharge: HOME | End: 2018-02-22
Attending: INTERNAL MEDICINE | Admitting: INTERNAL MEDICINE
Payer: COMMERCIAL

## 2018-02-22 DIAGNOSIS — Z79.01 LONG-TERM (CURRENT) USE OF ANTICOAGULANTS: ICD-10-CM

## 2018-02-22 DIAGNOSIS — D68.52 PROTHROMBIN GENE MUTATION (H): ICD-10-CM

## 2018-02-22 LAB — INR PPP: 5.02 (ref 0.86–1.14)

## 2018-02-22 PROCEDURE — 85610 PROTHROMBIN TIME: CPT | Performed by: INTERNAL MEDICINE

## 2018-02-22 PROCEDURE — 36415 COLL VENOUS BLD VENIPUNCTURE: CPT | Performed by: INTERNAL MEDICINE

## 2018-02-23 ENCOUNTER — ANTICOAGULATION THERAPY VISIT (OUTPATIENT)
Dept: ANTICOAGULATION | Facility: CLINIC | Age: 38
End: 2018-02-23

## 2018-02-23 DIAGNOSIS — D68.52 PROTHROMBIN GENE MUTATION (H): ICD-10-CM

## 2018-02-23 DIAGNOSIS — Z79.01 LONG-TERM (CURRENT) USE OF ANTICOAGULANTS: ICD-10-CM

## 2018-02-23 NOTE — PROGRESS NOTES
ANTICOAGULATION FOLLOW-UP CLINIC VISIT    Patient Name:  Stacey Maki  Date:  2/23/2018  Contact Type:  Telephone    SUBJECTIVE:     Patient Findings     Positives Unexplained INR or factor level change    Comments Took valcyclovir x one last week, has been using monistat           OBJECTIVE    INR   Date Value Ref Range Status   02/22/2018 5.02 (HH) 0.86 - 1.14 Final     Comment:     Documentation to follow  Critical Value called to and read back by  DR.WOAN FARIA([facility] HEME/ONCO) 2.22.18 1713 LS/NIKKI         ASSESSMENT / PLAN  INR assessment SUPRA    Recheck INR In: 3 DAYS    INR Location Clinic      Anticoagulation Summary as of 2/23/2018     INR goal 2.0-3.0   Today's INR 5.02! (2/22/2018)   Maintenance plan 6 mg (2 mg x 3) on Wed; 4 mg (2 mg x 2) all other days   Full instructions 2/23: Hold; Otherwise 6 mg on Wed; 4 mg all other days   Weekly total 30 mg   Plan last modified Karina Khoury RN (5/2/2017)   Next INR check 2/26/2018   Priority INR   Target end date     Indications   Long-term (current) use of anticoagulants [Z79.01] [Z79.01]  Prothrombin gene mutation (H) [D68.52]         Anticoagulation Episode Summary     INR check location     Preferred lab     Send INR reminders to  [facility]    Comments Patient has 2 mg Tablets  Patient contact number [phone]  HIPAA INFO OK to leave messages on home or cell phone.  leave msg on cell:  [phone]    OK to speak byron Potts      Anticoagulation Care Providers     Provider Role Specialty Phone number    Danny Clark MD Responsible Hematology [phone]            See the Encounter Report to view Anticoagulation Flowsheet and Dosing Calendar (Go to Encounters tab in chart review, and find the Anticoagulation Therapy Visit)    Left message for patient with results and dosing recommendations. Asked patient to call back to report any missed doses, falls, signs and symptoms of bleeding or clotting, any changes in health,  medication, or diet. Asked patient to call back with any questions or concerns.  Left message on her cell phone per her request.      On message, asked Stacey to call the [facility]--so I know she received message.     Merced Cadet RN        ADDENDUM:  2/23/18:  Stacey called back--she cannot think of any changes that might have made her INR go up.  She will hold warfarin again today.  Reviewed signs of bleeding with her and she knows to be seen urgently if any develop.  Merced Cadet RN

## 2018-02-23 NOTE — TELEPHONE ENCOUNTER
Paged by lab regarding critical value, INR 5.02.    Called Ms. Maki who reports feeling well without any issues with bleeding. Denies headaches, no GI bleed. She does note lots of dark bruising on her legs, which is not entirely unusual as her kids like to climb on her.  She also endorses some tenderness in her low throat, like she was punched there and it is bruised.    Of note, she reports being compliant with dosing recommendations, holding dose Monday, then 4mg Tuesday and 6mg Wednesday. She even ate extra spinach last night and had 1 glass of wine.    Regarding medications, she has been on fluoxetine since 2016. She received a 1 day dose of antiviral medication for a cold sore approx 1.5 weeks ago. She also used Monostat last week and did take some Pro-V Refresh pills. However, she has not done so this week.    Recommendations:  -hold warfarin dose tonight and until otherwise instructed  -call coag clinic in AM  -writer will staff message most recent pharmD and route note to Dr. Clark.  -instructed patient to go to ED if new/severe headache or evidence of GI bleed    Patient agreeable with plan.

## 2018-02-23 NOTE — MR AVS SNAPSHOT
Stacey Combs Glynn   2/23/2018   Anticoagulation Therapy Visit    Description:  37 year old female   Provider:  Merced Cadet, RN   Department:  [facility]           INR as of 2/23/2018     Today's INR 5.02! (2/22/2018)      Anticoagulation Summary as of 2/23/2018     INR goal 2.0-3.0   Today's INR 5.02! (2/22/2018)   Full instructions 2/23: Hold; Otherwise 6 mg on Wed; 4 mg all other days   Next INR check 2/26/2018    Indications   Long-term (current) use of anticoagulants [Z79.01] [Z79.01]  Prothrombin gene mutation (H) [D68.52]         February 2018 Details    Sun Mon Tue Wed Thu Fri Sat         1               2               3                 4               5               6               7               8               9               10                 11               12               13               14               15               16               17                 18               19               20               21               22               23      Hold   See details      24      4 mg           25      4 mg         26            27               28                   Date Details   02/23 This INR check       Date of next INR:  2/26/2018         How to take your warfarin dose     To take:  4 mg Take 2 of the 2 mg tablets.    Hold Do not take your warfarin dose. See the Details table to the right for additional instructions.

## 2018-02-26 ENCOUNTER — HOSPITAL ENCOUNTER (OUTPATIENT)
Dept: LAB | Facility: CLINIC | Age: 38
Discharge: HOME | End: 2018-02-26
Attending: INTERNAL MEDICINE | Admitting: INTERNAL MEDICINE
Payer: COMMERCIAL

## 2018-02-26 DIAGNOSIS — D68.52 PROTHROMBIN GENE MUTATION (H): ICD-10-CM

## 2018-02-26 DIAGNOSIS — Z79.01 LONG-TERM (CURRENT) USE OF ANTICOAGULANTS: ICD-10-CM

## 2018-02-26 LAB — INR PPP: 2.77 (ref 0.86–1.14)

## 2018-02-26 PROCEDURE — 85610 PROTHROMBIN TIME: CPT | Performed by: INTERNAL MEDICINE

## 2018-02-26 PROCEDURE — 36415 COLL VENOUS BLD VENIPUNCTURE: CPT | Performed by: INTERNAL MEDICINE

## 2018-02-27 ENCOUNTER — ANTICOAGULATION THERAPY VISIT (OUTPATIENT)
Dept: ANTICOAGULATION | Facility: CLINIC | Age: 38
End: 2018-02-27

## 2018-02-27 DIAGNOSIS — Z79.01 LONG-TERM (CURRENT) USE OF ANTICOAGULANTS: ICD-10-CM

## 2018-02-27 DIAGNOSIS — D68.52 PROTHROMBIN GENE MUTATION (H): ICD-10-CM

## 2018-02-27 NOTE — MR AVS SNAPSHOT
Stacey Combs Glynn   2/27/2018   Anticoagulation Therapy Visit    Description:  37 year old female   Provider:  Leticia Cho, RN   Department:  [facility]           INR as of 2/27/2018     Today's INR 2.77 (2/26/2018)      Anticoagulation Summary as of 2/27/2018     INR goal 2.0-3.0   Today's INR 2.77 (2/26/2018)   Full instructions 6 mg on Wed; 4 mg all other days   Next INR check 3/6/2018    Indications   Long-term (current) use of anticoagulants [Z79.01] [Z79.01]  Prothrombin gene mutation (H) [D68.52]         February 2018 Details    Sun Mon Tue Wed Thu Fri Sat         1               2               3                 4               5               6               7               8               9               10                 11               12               13               14               15               16               17                 18               19               20               21               22               23               24                 25               26               27      4 mg   See details      28      6 mg             Date Details   02/27 This INR check               How to take your warfarin dose     To take:  4 mg Take 2 of the 2 mg tablets.    To take:  6 mg Take 3 of the 2 mg tablets.           March 2018 Details    Sun Mon Tue Wed Thu Fri Sat         1      4 mg         2      4 mg         3      4 mg           4      4 mg         5      4 mg         6            7               8               9               10                 11               12               13               14               15               16               17                 18               19               20               21               22               23               24                 25               26               27               28               29               30               31                Date Details   No additional details    Date of next INR:  3/6/2018          How to take your warfarin dose     To take:  4 mg Take 2 of the 2 mg tablets.

## 2018-02-27 NOTE — PROGRESS NOTES
ANTICOAGULATION FOLLOW-UP CLINIC VISIT    Patient Name:  Stacey Maki  Date:  2/27/2018  Contact Type:  Telephone    SUBJECTIVE:        OBJECTIVE    INR   Date Value Ref Range Status   02/26/2018 2.77 (H) 0.86 - 1.14 Final       ASSESSMENT / PLAN  No question data found.  Anticoagulation Summary as of 2/27/2018     INR goal 2.0-3.0   Today's INR 2.77 (2/26/2018)   Maintenance plan 6 mg (2 mg x 3) on Wed; 4 mg (2 mg x 2) all other days   Full instructions 6 mg on Wed; 4 mg all other days   Weekly total 30 mg   No change documented Leticia Cho, RN   Plan last modified Karina Khoury RN (5/2/2017)   Next INR check 3/6/2018   Priority INR   Target end date     Indications   Long-term (current) use of anticoagulants [Z79.01] [Z79.01]  Prothrombin gene mutation (H) [D68.52]         Anticoagulation Episode Summary     INR check location     Preferred lab     Send INR reminders to [facility]    Comments Patient has 2 mg Tablets  Patient contact number [phone]  HIPAA INFO OK to leave messages on home or cell phone.  leave msg on cell:  [phone]    OK to speak byron Potts      Anticoagulation Care Providers     Provider Role Specialty Phone number    Danny Clark MD Responsible Hematology [phone]            See the Encounter Report to view Anticoagulation Flowsheet and Dosing Calendar (Go to Encounters tab in chart review, and find the Anticoagulation Therapy Visit)    Left message for patient with results and dosing recommendations. Asked patient to call back to report any missed doses, falls, signs and symptoms of bleeding or clotting, any changes in health, medication, or diet. Asked patient to call back with any questions or concerns.     Leticia Cho, MARCOS             Addendum: 3/26/18  Stacey reports that she woke up to a cold sore and took a large dose of antiviral medication that is just for 1 day. Stacey reports that last time she did this her INR was > 5 so she held  her warfarin last night.  Stacey will have her INR checked on 3/28 or 3/29 and continue with current warfarin regimen. CHERRY

## 2018-04-03 ENCOUNTER — ANTICOAGULATION THERAPY VISIT (OUTPATIENT)
Dept: LAB | Facility: CLINIC | Age: 38
End: 2018-04-03

## 2018-04-03 DIAGNOSIS — Z79.01 LONG-TERM (CURRENT) USE OF ANTICOAGULANTS: ICD-10-CM

## 2018-04-03 DIAGNOSIS — D68.52 PROTHROMBIN GENE MUTATION (H): ICD-10-CM

## 2018-04-03 NOTE — MR AVS SNAPSHOT
Stacey Maki   4/3/2018   Anticoagulation Therapy Visit    Description:  38 year old female   Provider:  Adam Koch, [facility]   Department:  [facility]           INR as of 4/3/2018     Today's INR No new INR was available at the time of this encounter.      Anticoagulation Summary as of 4/3/2018     INR goal 2.0-3.0   Today's INR No new INR was available at the time of this encounter.   Full instructions 6 mg on Wed; 4 mg all other days   Next INR check 4/3/2018    Indications   Long-term (current) use of anticoagulants [Z79.01] [Z79.01]  Prothrombin gene mutation (H) [D68.52]         April 2018 Details    Sun Mon Tue Wed Thu Fri Sat     1               2               3      See details      4               5               6               7                 8               9               10               11               12               13               14                 15               16               17               18               19               20               21                 22               23               24               25               26               27               28                 29               30                     Date Details   04/03 This INR check       Date of next INR:  4/3/2018         How to take your warfarin dose     To take:  4 mg Take 2 of the 2 mg tablets.

## 2018-04-03 NOTE — PROGRESS NOTES
Left message for patient that they have not been compliant with having the necessary lab work for their anticoagulation therapy. Patient  has been sent two letters stating that they have missed their lab appointments and that it is very important to have labs done to make sure that they are in their therapeutic range to minimize the risks of bleeding and clotting. I asked them to call us or come in for their lab work as soon as possible. Patient will be inactivated from our anticoagulation monitoring service if they do not respond within one week.  (4/10/18)

## 2018-04-05 ENCOUNTER — HOSPITAL ENCOUNTER (OUTPATIENT)
Dept: LAB | Facility: CLINIC | Age: 38
Discharge: HOME | End: 2018-04-05
Attending: INTERNAL MEDICINE | Admitting: INTERNAL MEDICINE
Payer: COMMERCIAL

## 2018-04-05 DIAGNOSIS — D68.52 PROTHROMBIN GENE MUTATION (H): ICD-10-CM

## 2018-04-05 DIAGNOSIS — Z79.01 LONG-TERM (CURRENT) USE OF ANTICOAGULANTS: ICD-10-CM

## 2018-04-05 LAB — INR PPP: 2.7 (ref 0.86–1.14)

## 2018-04-05 PROCEDURE — 85610 PROTHROMBIN TIME: CPT | Performed by: INTERNAL MEDICINE

## 2018-04-05 PROCEDURE — 36415 COLL VENOUS BLD VENIPUNCTURE: CPT | Performed by: INTERNAL MEDICINE

## 2018-04-11 ENCOUNTER — ANTICOAGULATION THERAPY VISIT (OUTPATIENT)
Dept: ANTICOAGULATION | Facility: CLINIC | Age: 38
End: 2018-04-11

## 2018-04-11 DIAGNOSIS — Z79.01 LONG-TERM (CURRENT) USE OF ANTICOAGULANTS: ICD-10-CM

## 2018-04-11 DIAGNOSIS — D68.52 PROTHROMBIN GENE MUTATION (H): ICD-10-CM

## 2018-04-11 NOTE — PROGRESS NOTES
ANTICOAGULATION FOLLOW-UP CLINIC VISIT    Patient Name:  Stacey Maki  Date:  4/11/2018  Contact Type:  Telephone    SUBJECTIVE:        OBJECTIVE    INR   Date Value Ref Range Status   04/05/2018 2.70 (H) 0.86 - 1.14 Final       ASSESSMENT / PLAN  INR assessment THER    Recheck INR In: 4 WEEKS    INR Location Clinic      Anticoagulation Summary as of 4/11/2018     INR goal 2.0-3.0   Today's INR 2.70 (4/5/2018)   Maintenance plan 6 mg (2 mg x 3) on Wed; 4 mg (2 mg x 2) all other days   Full instructions 6 mg on Wed; 4 mg all other days   Weekly total 30 mg   Plan last modified Karina Khoury RN (5/2/2017)   Next INR check 5/9/2018   Priority INR   Target end date     Indications   Long-term (current) use of anticoagulants [Z79.01] [Z79.01]  Prothrombin gene mutation (H) [D68.52]         Anticoagulation Episode Summary     INR check location     Preferred lab     Send INR reminders to [facility]    Comments Patient has 2 mg Tablets  Patient contact number [phone]  HIPAA INFO OK to leave messages on home or cell phone.  leave msg on cell:  [phone]    OK to speak byron Edson Delroy      Anticoagulation Care Providers     Provider Role Specialty Phone number    Danny Clark MD Responsible Hematology [phone]            See the Encounter Report to view Anticoagulation Flowsheet and Dosing Calendar (Go to Encounters tab in chart review, and find the Anticoagulation Therapy Visit)  Left message for patient with results and dosing recommendations. Asked patient to call back to report any missed doses, falls, signs and symptoms of bleeding or clotting, any changes in health, medication, or diet. Asked patient to call back with any questions or concerns.      Karina Khoury RN             5/22/18 ADDENDUM  Spoke to Stacey.  Recommended she get an INR today or tomorrow.  She has medication updates.  She has completed a course of Penicillin.  She missed a dose on Sunday.  She has a yeast  infection.  Reported taking Tylenol for fever that has resolved.  Patient phoned for instructions.  Writer told patient to get an INR as soon as possible to determine if any adjustments should be made to her dose.  Unable to update Anticoagulation tracking flowsheet.    Caesar Barrera RN

## 2018-04-11 NOTE — MR AVS SNAPSHOT
Stacey Combs Maki   4/11/2018   Anticoagulation Therapy Visit    Description:  38 year old female   Provider:  Karina Khoury, RN   Department:   [facility]           INR as of 4/11/2018     Today's INR 2.70 (4/5/2018)      Anticoagulation Summary as of 4/11/2018     INR goal 2.0-3.0   Today's INR 2.70 (4/5/2018)   Full instructions 6 mg on Wed; 4 mg all other days   Next INR check 5/9/2018    Indications   Long-term (current) use of anticoagulants [Z79.01] [Z79.01]  Prothrombin gene mutation (H) [D68.52]         April 2018 Details    Sun Mon Tue Wed Thu Fri Sat     1               2               3               4               5               6               7                 8               9               10               11      6 mg   See details      12      4 mg         13      4 mg         14      4 mg           15      4 mg         16      4 mg         17      4 mg         18      6 mg         19      4 mg         20      4 mg         21      4 mg           22      4 mg         23      4 mg         24      4 mg         25      6 mg         26      4 mg         27      4 mg         28      4 mg           29      4 mg         30      4 mg               Date Details   04/11 This INR check               How to take your warfarin dose     To take:  4 mg Take 2 of the 2 mg tablets.    To take:  6 mg Take 3 of the 2 mg tablets.           May 2018 Details    Sun Mon Tue Wed Thu Fri Sat       1      4 mg         2      6 mg         3      4 mg         4      4 mg         5      4 mg           6      4 mg         7      4 mg         8      4 mg         9            10               11               12                 13               14               15               16               17               18               19                 20               21               22               23               24               25               26                 27               28               29                30               31                  Date Details   No additional details    Date of next INR:  5/9/2018         How to take your warfarin dose     To take:  4 mg Take 2 of the 2 mg tablets.    To take:  6 mg Take 3 of the 2 mg tablets.

## 2018-05-23 ENCOUNTER — HOSPITAL ENCOUNTER (OUTPATIENT)
Dept: LAB | Facility: CLINIC | Age: 38
Discharge: HOME | End: 2018-05-23
Attending: INTERNAL MEDICINE | Admitting: INTERNAL MEDICINE
Payer: COMMERCIAL

## 2018-05-23 DIAGNOSIS — D68.52 PROTHROMBIN GENE MUTATION (H): ICD-10-CM

## 2018-05-23 DIAGNOSIS — Z79.01 LONG-TERM (CURRENT) USE OF ANTICOAGULANTS: ICD-10-CM

## 2018-05-23 LAB — INR PPP: 1.73 (ref 0.86–1.14)

## 2018-05-23 PROCEDURE — 85610 PROTHROMBIN TIME: CPT | Performed by: INTERNAL MEDICINE

## 2018-05-23 PROCEDURE — 36415 COLL VENOUS BLD VENIPUNCTURE: CPT | Performed by: INTERNAL MEDICINE

## 2018-05-24 ENCOUNTER — ANTICOAGULATION THERAPY VISIT (OUTPATIENT)
Dept: ANTICOAGULATION | Facility: CLINIC | Age: 38
End: 2018-05-24

## 2018-05-24 DIAGNOSIS — Z79.01 LONG-TERM (CURRENT) USE OF ANTICOAGULANTS: ICD-10-CM

## 2018-05-24 DIAGNOSIS — D68.52 PROTHROMBIN GENE MUTATION (H): ICD-10-CM

## 2018-05-24 NOTE — MR AVS SNAPSHOT
Stacey Combs Glynn   5/24/2018   Anticoagulation Therapy Visit    Description:  38 year old female   Provider:  Praveena Blackwood, RN   Department:  [facility]           INR as of 5/24/2018     Today's INR 1.73! (5/23/2018)      Anticoagulation Summary as of 5/24/2018     INR goal 2.0-3.0   Today's INR 1.73! (5/23/2018)   Full warfarin instructions 5/24: 6 mg; Otherwise 6 mg on Wed; 4 mg all other days   Next INR check 6/6/2018    Indications   Long-term (current) use of anticoagulants [Z79.01] [Z79.01]  Prothrombin gene mutation (H) [D68.52]         May 2018 Details    Sun Mon Tue Wed Thu Fri Sat       1               2               3               4               5                 6               7               8               9               10               11               12                 13               14               15               16               17               18               19                 20               21               22               23               24      6 mg   See details      25      4 mg         26      4 mg           27      4 mg         28      4 mg         29      4 mg         30      6 mg         31      4 mg            Date Details   05/24 This INR check               How to take your warfarin dose     To take:  4 mg Take 2 of the 2 mg tablets.    To take:  6 mg Take 3 of the 2 mg tablets.           June 2018 Details    Sun Mon Tue Wed Thu Fri Sat          1      4 mg         2      4 mg           3      4 mg         4      4 mg         5      4 mg         6            7               8               9                 10               11               12               13               14               15               16                 17               18               19               20               21               22               23                 24               25               26               27               28               29               30                 Date Details   No additional details    Date of next INR:  6/6/2018         How to take your warfarin dose     To take:  4 mg Take 2 of the 2 mg tablets.    To take:  6 mg Take 3 of the 2 mg tablets.

## 2018-05-24 NOTE — PROGRESS NOTES
ANTICOAGULATION FOLLOW-UP CLINIC VISIT    Patient Name:  Stacey Maki  Date:  5/24/2018  Contact Type:  Telephone    SUBJECTIVE:     Patient Findings     Positives Missed doses    Comments Per previous note pt communicated missed dose of 5/20. Will document this on the calendar to reflect this           OBJECTIVE    INR   Date Value Ref Range Status   05/23/2018 1.73 (H) 0.86 - 1.14 Final       ASSESSMENT / PLAN  INR assessment SUB    Recheck INR In: 2 WEEKS    INR Location Clinic      Anticoagulation Summary as of 5/24/2018     INR goal 2.0-3.0   Today's INR 1.73! (5/23/2018)   Warfarin maintenance plan 6 mg (2 mg x 3) on Wed; 4 mg (2 mg x 2) all other days   Full warfarin instructions 5/24: 6 mg; Otherwise 6 mg on Wed; 4 mg all other days   Weekly warfarin total 30 mg   Plan last modified Karina Khoury, RN (5/2/2017)   Next INR check 6/6/2018   Priority INR   Target end date     Indications   Long-term (current) use of anticoagulants [Z79.01] [Z79.01]  Prothrombin gene mutation (H) [D68.52]         Anticoagulation Episode Summary     INR check location     Preferred lab     Send INR reminders to [facility]    Comments Patient has 2 mg Tablets Patient contact number [phone]  HIPAA INFO OK to leave messages on home or cell phone leave msg on cell [phone]  Goes to [facility] for labs/results do not come to in-basket +++SEND POOL MESSAG    OK to speak byron Potts      Anticoagulation Care Providers     Provider Role Specialty Phone number    Danny Clark MD Responsible Hematology [phone]            See the Encounter Report to view Anticoagulation Flowsheet and Dosing Calendar (Go to Encounters tab in chart review, and find the Anticoagulation Therapy Visit)    Left message for patient with results and dosing recommendations. Asked patient to call back to report any missed doses, falls, signs and symptoms of bleeding or clotting, any changes in health, medication, or  diet. Asked patient to call back with any questions or concerns.     Praveena Blackwood RN

## 2018-06-27 ENCOUNTER — HOSPITAL ENCOUNTER (OUTPATIENT)
Dept: LAB | Facility: CLINIC | Age: 38
Discharge: HOME | End: 2018-06-27
Attending: INTERNAL MEDICINE | Admitting: INTERNAL MEDICINE
Payer: COMMERCIAL

## 2018-06-27 DIAGNOSIS — Z79.01 LONG-TERM (CURRENT) USE OF ANTICOAGULANTS: ICD-10-CM

## 2018-06-27 DIAGNOSIS — D68.52 PROTHROMBIN GENE MUTATION (H): ICD-10-CM

## 2018-06-27 LAB — INR PPP: 2.28 (ref 0.86–1.14)

## 2018-06-27 PROCEDURE — 36415 COLL VENOUS BLD VENIPUNCTURE: CPT | Performed by: INTERNAL MEDICINE

## 2018-06-27 PROCEDURE — 85610 PROTHROMBIN TIME: CPT | Performed by: INTERNAL MEDICINE

## 2018-06-28 ENCOUNTER — ANTICOAGULATION THERAPY VISIT (OUTPATIENT)
Dept: ANTICOAGULATION | Facility: CLINIC | Age: 38
End: 2018-06-28

## 2018-06-28 DIAGNOSIS — Z79.01 LONG-TERM (CURRENT) USE OF ANTICOAGULANTS: ICD-10-CM

## 2018-06-28 DIAGNOSIS — D68.52 PROTHROMBIN GENE MUTATION (H): ICD-10-CM

## 2018-06-28 NOTE — MR AVS SNAPSHOT
Stacey Maki   6/28/2018   Anticoagulation Therapy Visit    Description:  38 year old female   Provider:  Adam Koch, [facility]   Department:  [facility]           INR as of 6/28/2018     Today's INR 2.28 (6/27/2018)      Anticoagulation Summary as of 6/28/2018     INR goal 2.0-3.0   Today's INR 2.28 (6/27/2018)   Full warfarin instructions 6 mg on Wed; 4 mg all other days   Next INR check 7/12/2018    Indications   Long-term (current) use of anticoagulants [Z79.01] [Z79.01]  Prothrombin gene mutation (H) [D68.52]         June 2018 Details    Sun Mon Tue Wed Thu Fri Sat          1               2                 3               4               5               6               7               8               9                 10               11               12               13               14               15               16                 17               18               19               20               21               22               23                 24               25               26               27               28      4 mg   See details      29      4 mg         30      4 mg          Date Details   06/28 This INR check               How to take your warfarin dose     To take:  4 mg Take 2 of the 2 mg tablets.           July 2018 Details    Sun Mon Tue Wed Thu Fri Sat     1      4 mg         2      4 mg         3      4 mg         4      6 mg         5      4 mg         6      4 mg         7      4 mg           8      4 mg         9      4 mg         10      4 mg         11      6 mg         12            13               14                 15               16               17               18               19               20               21                 22               23               24               25               26               27               28                 29               30               31                    Date Details   No additional details    Date of  next INR:  7/12/2018         How to take your warfarin dose     To take:  4 mg Take 2 of the 2 mg tablets.    To take:  6 mg Take 3 of the 2 mg tablets.

## 2018-06-28 NOTE — PROGRESS NOTES
ANTICOAGULATION FOLLOW-UP CLINIC VISIT    Patient Name:  Stacey Maki  Date:  6/28/2018  Contact Type:  Telephone    SUBJECTIVE:        OBJECTIVE    INR   Date Value Ref Range Status   06/27/2018 2.28 (H) 0.86 - 1.14 Final       ASSESSMENT / PLAN  INR assessment THER    Recheck INR In: 2 WEEKS    INR Location Clinic      Anticoagulation Summary as of 6/28/2018     INR goal 2.0-3.0   Today's INR 2.28 (6/27/2018)   Warfarin maintenance plan 6 mg (2 mg x 3) on Wed; 4 mg (2 mg x 2) all other days   Full warfarin instructions 6 mg on Wed; 4 mg all other days   Weekly warfarin total 30 mg   Plan last modified Karina Khoury RN (5/2/2017)   Next INR check 7/12/2018   Priority INR   Target end date     Indications   Long-term (current) use of anticoagulants [Z79.01] [Z79.01]  Prothrombin gene mutation (H) [D68.52]         Anticoagulation Episode Summary     INR check location     Preferred lab     Send INR reminders to [facility]    Comments Patient has 2 mg Tablets Patient contact number [phone]  HIPAA INFO OK to leave messages on home or cell phone leave msg on cell [phone]  Goes to [facility] for labs/results do not come to in-basket +++SEND POOL [facility]    OK to speak byron Potts      Anticoagulation Care Providers     Provider Role Specialty Phone number    Danny Clark MD Responsible Hematology [phone]            See the Encounter Report to view Anticoagulation Flowsheet and Dosing Calendar (Go to Encounters tab in chart review, and find the Anticoagulation Therapy Visit)    [facility] Intern left message for patient with results and dosing recommendations. Asked patient to call back to report any missed doses, falls, signs and symptoms of bleeding or clotting, any changes in health, medication, or diet. Asked patient to call back with any questions or concerns.      Adam Koch [facility]

## 2018-08-06 ENCOUNTER — ANTICOAGULATION THERAPY VISIT (OUTPATIENT)
Dept: ANTICOAGULATION | Facility: CLINIC | Age: 38
End: 2018-08-06

## 2018-08-06 ENCOUNTER — HOSPITAL ENCOUNTER (OUTPATIENT)
Dept: LAB | Facility: CLINIC | Age: 38
Discharge: HOME | End: 2018-08-06
Attending: INTERNAL MEDICINE | Admitting: INTERNAL MEDICINE
Payer: COMMERCIAL

## 2018-08-06 DIAGNOSIS — Z79.01 LONG-TERM (CURRENT) USE OF ANTICOAGULANTS: ICD-10-CM

## 2018-08-06 DIAGNOSIS — D68.52 PROTHROMBIN GENE MUTATION (H): ICD-10-CM

## 2018-08-06 LAB — INR PPP: 3.06 (ref 0.86–1.14)

## 2018-08-06 PROCEDURE — 36415 COLL VENOUS BLD VENIPUNCTURE: CPT | Performed by: INTERNAL MEDICINE

## 2018-08-06 PROCEDURE — 85610 PROTHROMBIN TIME: CPT | Performed by: INTERNAL MEDICINE

## 2018-08-06 NOTE — PROGRESS NOTES
ANTICOAGULATION FOLLOW-UP CLINIC VISIT    Patient Name:  Stacey Maki  Date:  8/6/2018  Contact Type:  Telephone    SUBJECTIVE:        OBJECTIVE    INR   Date Value Ref Range Status   08/06/2018 3.06 (H) 0.86 - 1.14 Final       ASSESSMENT / PLAN  INR assessment THER    Recheck INR In: 3 WEEKS    INR Location Clinic      Anticoagulation Summary as of 8/6/2018     INR goal 2.0-3.0   Today's INR 3.06!   Warfarin maintenance plan 6 mg (2 mg x 3) on Wed; 4 mg (2 mg x 2) all other days   Full warfarin instructions 8/6: 3 mg; Otherwise 6 mg on Wed; 4 mg all other days   Weekly warfarin total 30 mg   Plan last modified Karina Khoury RN (5/2/2017)   Next INR check 8/27/2018   Priority INR   Target end date     Indications   Long-term (current) use of anticoagulants [Z79.01] [Z79.01]  Prothrombin gene mutation (H) [D68.52]         Anticoagulation Episode Summary     INR check location     Preferred lab     Send INR reminders to [facility]    Comments Patient has 2 mg Tablets Patient contact number [phone]  HIPAA INFO OK to leave messages on home or cell phone leave msg on cell [phone]  Goes to [facility] for labs/results do not come to in-basket +++SEND POOL [facility]    OK to speak byron Potts      Anticoagulation Care Providers     Provider Role Specialty Phone number    Danny Clark MD Responsible Hematology [phone]            See the Encounter Report to view Anticoagulation Flowsheet and Dosing Calendar (Go to Encounters tab in chart review, and find the Anticoagulation Therapy Visit)  Left message for patient with results and dosing recommendations. Asked patient to call back to report any missed doses, falls, signs and symptoms of bleeding or clotting, any changes in health, medication, or diet. Asked patient to call back with any questions or concerns.      Karina Khoury, MARCOS

## 2018-08-17 ENCOUNTER — OFFICE VISIT (OUTPATIENT)
Dept: DERMATOLOGY | Facility: CLINIC | Age: 38
End: 2018-08-17
Payer: COMMERCIAL

## 2018-08-17 DIAGNOSIS — D48.5 NEOPLASM OF UNCERTAIN BEHAVIOR OF SKIN: Primary | ICD-10-CM

## 2018-08-17 DIAGNOSIS — L73.9 FOLLICULITIS: ICD-10-CM

## 2018-08-17 DIAGNOSIS — D22.9 MULTIPLE BENIGN NEVI: ICD-10-CM

## 2018-08-17 DIAGNOSIS — L82.1 SEBORRHEIC KERATOSES: ICD-10-CM

## 2018-08-17 DIAGNOSIS — D23.9 DERMATOFIBROMA: ICD-10-CM

## 2018-08-17 RX ORDER — LIDOCAINE HYDROCHLORIDE AND EPINEPHRINE 10; 10 MG/ML; UG/ML
1 INJECTION, SOLUTION INFILTRATION; PERINEURAL ONCE
Qty: 1 ML | Refills: 0 | OUTPATIENT
Start: 2018-08-17 | End: 2018-08-17

## 2018-08-17 RX ORDER — CLINDAMYCIN PHOSPHATE 10 UG/ML
LOTION TOPICAL 2 TIMES DAILY
Qty: 60 ML | Refills: 3 | Status: SHIPPED | OUTPATIENT
Start: 2018-08-17 | End: 2018-11-12

## 2018-08-17 ASSESSMENT — PAIN SCALES - GENERAL
PAINLEVEL: NO PAIN (0)
PAINLEVEL: NO PAIN (0)

## 2018-08-17 NOTE — MR AVS SNAPSHOT
After Visit Summary   8/17/2018    Stacey Maki    MRN: [id number]           Patient Information     Date Of Birth          1980        Visit Information        Provider Department      8/17/2018 2:00 PM Nayana Esquivel PA-C [facility]        Today's Diagnoses     Neoplasm of uncertain behavior of skin    -  1      Care Instructions    Wound Care After a Biopsy    What is a skin biopsy?  A skin biopsy allows the doctor to examine a very small piece of tissue under the microscope to determine the diagnosis and the best treatment for the skin condition. A local anesthetic (numbing medicine)  is injected with a very small needle into the skin area to be tested. A small piece of skin is taken from the area. Sometimes a suture (stitch) is used.     What are the risks of a skin biopsy?  I will experience scar, bleeding, swelling, pain, crusting and redness. I may experience incomplete removal or recurrence. Risks of this procedure are excessive bleeding, bruising, infection, nerve damage, numbness, thick (hypertrophic or keloidal) scar and non-diagnostic biopsy.    How should I care for my wound for the first 24 hours?    Keep the wound dry and covered for 24 hours    If it bleeds, hold direct pressure on the area for 15 minutes. If bleeding does not stop then go to the emergency room    Avoid strenuous exercise the first 1-2 days or as your doctor instructs you    How should I care for the wound after 24 hours?    After 24 hours, remove the bandage    You may bathe or shower as normal    If you had a scalp biopsy, you can shampoo as usual and can use shower water to clean the biopsy site daily    Clean the wound twice a day with gentle soap and water    Do not scrub, be gentle    Apply white petroleum/Vaseline after cleaning the wound with a cotton swab or a clean finger, and keep the site covered with a Bandaid /bandage. Bandages are not necessary with a scalp biopsy    If you are  unable to cover the site with a Bandaid /bandage, re-apply ointment 2-3 times a day to keep the site moist. Moisture will help with healing    Avoid strenuous activity for first 1-2 days    Avoid lakes, rivers, pools, and oceans until the stitches are removed or the site is healed    How do I clean my wound?    Wash hands thoroughly with soap or use hand  before all wound care    Clean the wound with gentle soap and water    Apply white petroleum/Vaseline  to wound after it is clean    Replace the Bandaid /bandage to keep the wound covered for the first few days or as instructed by your doctor    If you had a scalp biopsy, warm shower water to the area on a daily basis should suffice    What should I use to clean my wound?     Cotton-tipped applicators (Qtips )    White petroleum jelly (Vaseline ). Use a clean new container and use Q-tips to apply.    Bandaids   as needed    Gentle soap     How should I care for my wound long term?    Do not get your wound dirty    Keep up with wound care for one week or until the area is healed.    A small scab will form and fall off by itself when the area is completely healed. The area will be red and will become pink in color as it heals. Sun protection is very important for how your scar will turn out. Sunscreen with an SPF 30 or greater is recommended once the area is healed.    You should have some soreness but it should be mild and slowly go away over several days. Talk to your doctor about using tylenol for pain,    When should I call my doctor?  If you have increased:     Pain or swelling    Pus or drainage (clear or slightly yellow drainage is ok)    Temperature over 100F    Spreading redness or warmth around wound    When will I hear about my results?  The biopsy results can take 2-3 weeks to come back. The clinic will call you with the results, send you a [facility] message, or have you schedule a follow-up clinic or phone time to discuss the results. Contact our  clinics if you do not hear from us in 3 weeks.     Who should I call with questions?    [facility]: [phone]     [facility]: [phone]    For urgent needs outside of business hours call the [facility] at [phone] and ask for the dermatology resident on call              Follow-ups after your visit        Who to contact     Please call your clinic at [phone] to:    Ask questions about your health    Make or cancel appointments    Discuss your medicines    Learn about your test results    Speak to your doctor            Additional Information About Your Visit        [employer] Information     [employer] gives you secure access to your electronic health record. If you see a primary care provider, you can also send messages to your care team and make appointments. If you have questions, please call your primary care clinic.  If you do not have a primary care provider, please call [phone] and they will assist you.      [employer] is an electronic gateway that provides easy, online access to your medical records. With [employer], you can request a clinic appointment, read your test results, renew a prescription or communicate with your care team.     To access your existing account, please contact your [facility] or call [phone] for assistance.        Care EveryWhere ID     This is your Care EveryWhere ID. This could be used by other organizations to access your [facility] medical records  [id number]         Blood Pressure from Last 3 Encounters:   10/10/17 147/89   10/06/16 126/89   06/28/16 (!) 134/91    Weight from Last 3 Encounters:   10/10/17 65.4 kg (144 lb 3.2 oz)   10/06/16 62.9 kg (138 lb 11.2 oz)   06/28/16 61.7 kg (136 lb)              We Performed the Following     BIOPSY SKIN/SUBQ/MUC MEM, SINGLE LESION     Dermatological path order and indications          Today's Medication Changes           These changes are accurate as of 8/17/18  2:45 PM.  If you have any questions, ask your nurse or doctor.               Start taking these medicines.        Dose/Directions    lidocaine 1% with EPINEPHrine 1:100,000 1 %-1:235078 injection   Used for:  Neoplasm of uncertain behavior of skin   Started by:  Nayana Esquivel PA-C        Dose:  1 mL   Inject 1 mL into the skin once for 1 dose   Quantity:  1 mL   Refills:  0            Where to get your medicines      Some of these will need a paper prescription and others can be bought over the counter.  Ask your nurse if you have questions.     You don't need a prescription for these medications     lidocaine 1% with EPINEPHrine 1:100,000 1 %-1:310484 injection                Primary Care Provider Office Phone # Fax #    Ulices Guevara [phone] [address] [facility] [address]        Equal Access to Services     [facility]: Hadii aad ku hadasho Somontserrat, waaxda luqadaha, qaybta kaalmada adedonavonyada, ryan jones . So [facility] [phone].    ATENCIÓN: Si habla español, tiene a santos disposición servicios gratuitos de asistencia lingüística. Umbertoame al [phone].    We comply with applicable federal civil rights laws and [place] laws. We do not discriminate on the basis of race, color, national origin, age, disability, sex, sexual orientation, or gender identity.            Thank you!     Thank you for choosing [facility]  for your care. Our goal is always to provide you with excellent care. Hearing back from our patients is one way we can continue to improve our services. Please take a few minutes to complete the written survey that you may receive in the mail after your visit with us. Thank you!             Your Updated Medication List - Protect others around you: Learn how to safely use, store and throw away your medicines at www.disposemymeds.org.          This list is accurate as of 8/17/18   2:45 PM.  Always use your most recent med list.                   Brand Name Dispense Instructions for use Diagnosis    ACETAMIN 500 MG tablet   Generic drug:  acetaminophen      Take 2 tablets by mouth every 6 hours as needed.        FLUoxetine 10 MG capsule    PROZAC    90 capsule    Take 2 capsules (20 mg) by mouth daily Starting with ovulation through first day of menses    PMDD (premenstrual dysphoric disorder)       lidocaine 1% with EPINEPHrine 1:100,000 1 %-1:781633 injection     1 mL    Inject 1 mL into the skin once for 1 dose    Neoplasm of uncertain behavior of skin       warfarin 2 MG tablet    COUMADIN    65 tablet    Take 6mgs on Wednesdays and 4mgs on all other days or  as directed by anticoagulation clinic.    Deep vein thrombosis (H)

## 2018-08-17 NOTE — NURSING NOTE
Chief Complaint   Patient presents with     Skin Check     Stacey states she is here to have her mole on her nose check and heat rash in between her breast     Rashid , SOL

## 2018-08-17 NOTE — PROGRESS NOTES
[facility] D[facility]atology Note      Dermatology Problem List:  1. Prothrombin gene mutation  2. Folliculitis - chest  - clindamycin 1% lotion  -previous tx: BPO was  3. NUB, left lateral nose  - pending bx results     Encounter Date: Aug 17, 2018    CC:  Chief Complaint   Patient presents with     Skin Check     Stacey states she is here to have her mole on her nose check and heat rash in between her breast         History of Present Illness:  Ms. Stacey Maki is a 38 year old female who presents today for a skin exam. The patient was last seen by Dr. Ghosh on 9/9/14, at which time she was recommended benzoyl peroxide wash for folliculitis. At today's visit, she reports a flat, flesh colored mole on her nose that she would like checked. This has been present for about 1 year, but in the past few weeks it began bleeding and scabbing. She's had biopsies done in the past for other abnormal moles, but these returned benign. She also has a rash in between her breasts. This tends to get worse with heat. She tries to avoid applying products to the area. She denies family or personal history of skin cancer. She recently returned from a trip to the [place]. The patient is otherwise feeling well. There are no other skin concerns at this time. The patient denies painful, itching, tingling or bleeding lesions unless otherwise noted.        Past Medical History:   Patient Active Problem List   Diagnosis     Benign neoplasm of skin     Dermatofibroma     Prothrombin gene mutation (H)     Long-term (current) use of anticoagulants [Z79.01]     Past Medical History:   Diagnosis Date     Arteriovenous malformation      Asthma      Chronic cerebral venous sinus thrombosis     diagnosed in 2007, 2nd 2009 5wk preg.      Heart disease     mother     Hypertension 2016    mother     Idiopathic intracranial hypertension      Papilledema      Prothrombin gene mutation (H)     heterozygous type     Tension headache       Past Surgical History:   Procedure Laterality Date     cerebral angiogram      to discuss management of neural- based fistula      SECTION       ENT SURGERY  1983    adnoids removed/tubes in ears at age 3     GYN SURGERY      C section x 2     IR INTRACRANIAL EMBOLIZATION RIGHT  2011     MRI last Nov.      NO HISTORY OF SURGERY  3/20/14    derm     VASCULAR SURGERY      fistula repaired in brain       Social History:  Social History     Social History     Marital status:      Spouse name: Edson     Number of children: 2     Years of education: 18     Occupational History      [employer]          Social History Main Topics     Smoking status: Never Smoker     Smokeless tobacco: Never Used     Alcohol use Yes      Comment: 2-4 drinks per month     Drug use: No     Sexual activity: Yes     Partners: Male     Birth control/ protection: Male Surgical      Comment: vasectomy     Other Topics Concern      Service No     Blood Transfusions No     Caffeine Concern No     magdy tea in am     Occupational Exposure No     Hobby Hazards No     Sleep Concern No     Stress Concern No     Weight Concern No     Special Diet No     Back Care No     Exercise No     Bike Helmet No     Seat Belt No     Self-Exams No     Social History Narrative    How much exercise per week? Walks 5 days a weekHow much calcium per day? Eats a lot of dairy products daily. 3-4 times   How much caffeine per day? 1 cup a dayHow much vitamin D per day? DietDo you/your family wear seatbelts?  YesDo you/your family use safety helmets? YesDo you/your family use sunscreen? YesDo you/your family keep firearms in the home? NoDo you/your family have a smoke detector(s)? YesDo you feel safe in your home? YesHas anyone ever touched you in an unwanted manner? No    Renetta Shaikh CMA    10/05/2015       Family History:  Family History   Problem Relation Age of Onset     Cerebrovascular Disease Paternal  Grandmother      Cancer - colorectal Paternal Grandfather      Colon Cancer Paternal Grandfather      Alcohol/Drug Father      Substance Abuse Father      Alcohol/Drug Maternal Grandmother      Cerebrovascular Disease Other      paternal uncle     Anxiety Disorder Mother      Depression Mother      Obesity Mother      Cancer No family hx of      no skin cancer     Skin Cancer No family hx of      Melanoma No family hx of        Medications:  Current Outpatient Prescriptions   Medication Sig Dispense Refill     acetaminophen (ACETAMIN) 500 MG tablet Take 2 tablets by mouth every 6 hours as needed.       FLUoxetine (PROZAC) 10 MG capsule Take 2 capsules (20 mg) by mouth daily Starting with ovulation through first day of menses 90 capsule 3     warfarin (COUMADIN) 2 MG tablet Take 6mgs on Wednesdays and 4mgs on all other days or  as directed by anticoagulation clinic. 65 tablet 5       Allergies   Allergen Reactions     Benzoyl Peroxide Swelling     Swollen eyelids       Review of Systems:  -Skin Establ Pt: The patient denies any new rash, pruritus, or lesions that are symptomatic, changing or bleeding, except as per HPI.  -Constitutional: The patient is feeling generally well.   -Heme/Lymph: no concerning bumps, no bleeding or bruising problems.    Physical exam:  Vitals: There were no vitals taken for this visit.  GEN: This is a well developed, well-nourished female in no acute distress, in a pleasant mood.    SKIN: Full skin, which includes the head/face, both arms, chest, back, abdomen,both legs, genitalia and/or groin buttocks, digits and/or nails, was examined.  - 5 mm erythematous papule with an overlying crust on the left lateral nose. Non tender.    - Waxy stuck on tan to brown papules on the inframammary region.  - Brown macule on right lateral thigh.  - Scattered perifollicular erythematous papules between the breasts.   - Firm tan/flesh colored papule that dimples with lateral pressure on the left lateral  ankle.  - Multiple regular brown pigmented macules and papules are identified on the trunk and extremities (>100).   - No other lesions of concern on areas examined.       Impression/Plan:  1. Neoplasm of uncertain behavior on the left lateral nose. The differential diagnosis includes irritated benign nevus vs NMSC vs other.     After discussion of benefits and risks including but not limited to bleeding, infection, scar, incomplete removal, recurrence, and non-diagnostic biopsy, written consent and photographs were obtained. The area was cleaned with isopropyl alcohol. 1.5 mL of 1% lidocaine with epinephrine was injected to obtain adequate anesthesia of the lesion on the left lateral nose. A shave biopsy was performed. Hemostasis was achieved with aluminium chloride. Vaseline and a sterile dressing were applied. The patient tolerated the procedure and no complications were noted. The patient was provided with verbal and written post care instructions.     2. Folliculitis, medial chest      Start clindamycin 1% lotion BID as needed. May continue with BPO wash as she previously had found this helpful     3. Seborrheic keratosis, non irritated    Benign nature reassured. No further intervention required unless lesions become symptomatic or bothersome.     4. Multiple clinically benign nevi, trunk and extremities     ABCDs of melanoma were discussed and self skin checks were advised.     5. Dermatofibroma    Benign nature reassured. No further intervention required.       Follow-up 1 year, earlier for new or changing lesions.       Staff Involved:    Scribe Disclosure  I, Richi Enamorado, am serving as a scribe to document services personally performed by Nayana Esquivel PA-C, based on data collection and the provider's statements to me.   Provider Disclosure:   The documentation recorded by the scribe accurately reflects the services I personally performed and the decisions made by me.    All risks, benefits and  alternatives were discussed with patient.  Patient is in agreement and understands the assessment and plan.  All questions were answered.    Nayana Esquivel PA-C  [facility] [place]: Phone: [phone], Fax: [phone]

## 2018-08-17 NOTE — LETTER
8/17/2018       RE: Stacey Maki  [address] [place] [address]     Dear Colleague,    Thank you for referring your patient, Stacey Maki, to the [facility] DERMATOLOGY at [facility]. Please see a copy of my visit note below.    [facility] Dermatology Note      Dermatology Problem List:  1. Prothrombin gene mutation  2. Folliculitis - chest  - clindamycin 1% lotion  -previous tx: BPO was  3. NUB, left lateral nose  - pending bx results     Encounter Date: Aug 17, 2018    CC:  Chief Complaint   Patient presents with     Skin Check     Stacey states she is here to have her mole on her nose check and heat rash in between her breast         History of Present Illness:  Ms. Stacey Maki is a 38 year old female who presents today for a skin exam. The patient was last seen by Dr. Ghosh on 9/9/14, at which time she was recommended benzoyl peroxide wash for folliculitis. At today's visit, she reports a flat, flesh colored mole on her nose that she would like checked. This has been present for about 1 year, but in the past few weeks it began bleeding and scabbing. She's had biopsies done in the past for other abnormal moles, but these returned benign. She also has a rash in between her breasts. This tends to get worse with heat. She tries to avoid applying products to the area. She denies family or personal history of skin cancer. She recently returned from a trip to the [place]. The patient is otherwise feeling well. There are no other skin concerns at this time. The patient denies painful, itching, tingling or bleeding lesions unless otherwise noted.        Past Medical History:   Patient Active Problem List   Diagnosis     Benign neoplasm of skin     Dermatofibroma     Prothrombin gene mutation (H)     Long-term (current) use of anticoagulants [Z79.01]     Past Medical History:   Diagnosis Date     Arteriovenous malformation      Asthma       Chronic cerebral venous sinus thrombosis     diagnosed in , 2009 5wk preg.      Heart disease     mother     Hypertension 2016    mother     Idiopathic intracranial hypertension      Papilledema      Prothrombin gene mutation (H)     heterozygous type     Tension headache      Past Surgical History:   Procedure Laterality Date     cerebral angiogram      to discuss management of neural- based fistula      SECTION       ENT SURGERY      adnoids removed/tubes in ears at age 3     GYN SURGERY      C section x 2     IR INTRACRANIAL EMBOLIZATION RIGHT  2011     MRI last Nov.      NO HISTORY OF SURGERY  3/20/14    derm     VASCULAR SURGERY      fistula repaired in brain       Social History:  Social History     Social History     Marital status:      Spouse name: Edson     Number of children: 2     Years of education: 18     Occupational History      [employer]          Social History Main Topics     Smoking status: Never Smoker     Smokeless tobacco: Never Used     Alcohol use Yes      Comment: 2-4 drinks per month     Drug use: No     Sexual activity: Yes     Partners: Male     Birth control/ protection: Male Surgical      Comment: vasectomy     Other Topics Concern      Service No     Blood Transfusions No     Caffeine Concern No     magdy tea in am     Occupational Exposure No     Hobby Hazards No     Sleep Concern No     Stress Concern No     Weight Concern No     Special Diet No     Back Care No     Exercise No     Bike Helmet No     Seat Belt No     Self-Exams No     Social History Narrative    How much exercise per week? Walks 5 days a weekHow much calcium per day? Eats a lot of dairy products daily. 3-4 times   How much caffeine per day? 1 cup a dayHow much vitamin D per day? DietDo you/your family wear seatbelts?  YesDo you/your family use safety helmets? YesDo you/your family use sunscreen? YesDo you/your family keep firearms in the home?  NoDo you/your family have a smoke detector(s)? YesDo you feel safe in your home? YesHas anyone ever touched you in an unwanted manner? No    Renetta ShiakhCJ    10/05/2015       Family History:  Family History   Problem Relation Age of Onset     Cerebrovascular Disease Paternal Grandmother      Cancer - colorectal Paternal Grandfather      Colon Cancer Paternal Grandfather      Alcohol/Drug Father      Substance Abuse Father      Alcohol/Drug Maternal Grandmother      Cerebrovascular Disease Other      paternal uncle     Anxiety Disorder Mother      Depression Mother      Obesity Mother      Cancer No family hx of      no skin cancer     Skin Cancer No family hx of      Melanoma No family hx of        Medications:  Current Outpatient Prescriptions   Medication Sig Dispense Refill     acetaminophen (ACETAMIN) 500 MG tablet Take 2 tablets by mouth every 6 hours as needed.       FLUoxetine (PROZAC) 10 MG capsule Take 2 capsules (20 mg) by mouth daily Starting with ovulation through first day of menses 90 capsule 3     warfarin (COUMADIN) 2 MG tablet Take 6mgs on Wednesdays and 4mgs on all other days or  as directed by anticoagulation clinic. 65 tablet 5       Allergies   Allergen Reactions     Benzoyl Peroxide Swelling     Swollen eyelids       Review of Systems:  -Skin Establ Pt: The patient denies any new rash, pruritus, or lesions that are symptomatic, changing or bleeding, except as per HPI.  -Constitutional: The patient is feeling generally well.   -Heme/Lymph: no concerning bumps, no bleeding or bruising problems.    Physical exam:  Vitals: There were no vitals taken for this visit.  GEN: This is a well developed, well-nourished female in no acute distress, in a pleasant mood.    SKIN: Full skin, which includes the head/face, both arms, chest, back, abdomen,both legs, genitalia and/or groin buttocks, digits and/or nails, was examined.  - 5 mm erythematous papule with an overlying crust on the left lateral nose.  Non tender.    - Waxy stuck on tan to brown papules on the inframammary region.  - Brown macule on right lateral thigh.  - Scattered perifollicular erythematous papules between the breasts.   - Firm tan/flesh colored papule that dimples with lateral pressure on the left lateral ankle.  - Multiple regular brown pigmented macules and papules are identified on the trunk and extremities (>100).   - No other lesions of concern on areas examined.       Impression/Plan:  1. Neoplasm of uncertain behavior on the left lateral nose. The differential diagnosis includes irritated benign nevus vs NMSC vs other.     After discussion of benefits and risks including but not limited to bleeding, infection, scar, incomplete removal, recurrence, and non-diagnostic biopsy, written consent and photographs were obtained. The area was cleaned with isopropyl alcohol. 1.5 mL of 1% lidocaine with epinephrine was injected to obtain adequate anesthesia of the lesion on the left lateral nose. A shave biopsy was performed. Hemostasis was achieved with aluminium chloride. Vaseline and a sterile dressing were applied. The patient tolerated the procedure and no complications were noted. The patient was provided with verbal and written post care instructions.     2. Folliculitis, medial chest      Start clindamycin 1% lotion BID as needed. May continue with BPO wash as she previously had found this helpful     3. Seborrheic keratosis, non irritated    Benign nature reassured. No further intervention required unless lesions become symptomatic or bothersome.     4. Multiple clinically benign nevi, trunk and extremities     ABCDs of melanoma were discussed and self skin checks were advised.     5. Dermatofibroma    Benign nature reassured. No further intervention required.       Follow-up 1 year, earlier for new or changing lesions.       Staff Involved:    Scribe Disclosure  I, Richi Enamorado, am serving as a scribe to document services  personally performed by Nayana Esquivel PA-C, based on data collection and the provider's statements to me.   Provider Disclosure:   The documentation recorded by the scribe accurately reflects the services I personally performed and the decisions made by me.    All risks, benefits and alternatives were discussed with patient.  Patient is in agreement and understands the assessment and plan.  All questions were answered.    Nayana Esquivel PA-C  [facility]: Phone: [phone], Fax: [phone]

## 2018-08-17 NOTE — NURSING NOTE
Lidocaine 1%  1mL once for one use, starting 8/17/2018 ending 8/17/2018,  2mL disp, R-0, injection  Injected by Laurel Hdez MA

## 2018-08-17 NOTE — PATIENT INSTRUCTIONS

## 2018-08-18 ASSESSMENT — PATIENT HEALTH QUESTIONNAIRE - PHQ9: SUM OF ALL RESPONSES TO PHQ QUESTIONS 1-9: 4

## 2018-08-22 LAB — COPATH REPORT: NORMAL

## 2018-09-04 DIAGNOSIS — I82.409 DEEP VEIN THROMBOSIS (H): ICD-10-CM

## 2018-09-04 RX ORDER — WARFARIN SODIUM 2 MG/1
TABLET ORAL
Qty: 65 TABLET | Refills: 5 | Status: SHIPPED | OUTPATIENT
Start: 2018-09-04 | End: 2020-12-01

## 2018-09-24 ENCOUNTER — HOSPITAL ENCOUNTER (OUTPATIENT)
Dept: LAB | Facility: CLINIC | Age: 38
Discharge: HOME | End: 2018-09-24
Attending: INTERNAL MEDICINE | Admitting: INTERNAL MEDICINE
Payer: COMMERCIAL

## 2018-09-24 DIAGNOSIS — D68.52 PROTHROMBIN GENE MUTATION (H): ICD-10-CM

## 2018-09-24 DIAGNOSIS — Z79.01 LONG-TERM (CURRENT) USE OF ANTICOAGULANTS: ICD-10-CM

## 2018-09-24 LAB — INR PPP: 3.22 (ref 0.86–1.14)

## 2018-09-24 PROCEDURE — 36415 COLL VENOUS BLD VENIPUNCTURE: CPT | Performed by: INTERNAL MEDICINE

## 2018-09-24 PROCEDURE — 85610 PROTHROMBIN TIME: CPT | Performed by: INTERNAL MEDICINE

## 2018-09-25 ENCOUNTER — ANTICOAGULATION THERAPY VISIT (OUTPATIENT)
Dept: ANTICOAGULATION | Facility: CLINIC | Age: 38
End: 2018-09-25

## 2018-09-25 DIAGNOSIS — D68.52 PROTHROMBIN GENE MUTATION (H): ICD-10-CM

## 2018-09-25 DIAGNOSIS — Z79.01 LONG-TERM (CURRENT) USE OF ANTICOAGULANTS: ICD-10-CM

## 2018-09-25 NOTE — PROGRESS NOTES
ANTICOAGULATION FOLLOW-UP CLINIC VISIT    Patient Name:  Stacey Maki  Date:  9/25/2018  Contact Type:  Telephone    SUBJECTIVE:        OBJECTIVE    INR   Date Value Ref Range Status   09/24/2018 3.22 (H) 0.86 - 1.14 Final       ASSESSMENT / PLAN  No question data found.  Anticoagulation Summary as of 9/25/2018     INR goal 2.0-3.0   Today's INR 3.22! (9/24/2018)   Warfarin maintenance plan 4 mg (2 mg x 2) every day   Full warfarin instructions 4 mg every day   Weekly warfarin total 28 mg   Plan last modified Leticia Cho, RN (9/25/2018)   Next INR check 10/9/2018   Priority INR   Target end date     Indications   Long-term (current) use of anticoagulants [Z79.01] [Z79.01]  Prothrombin gene mutation (H) [D68.52]         Anticoagulation Episode Summary     INR check location     Preferred lab     Send INR reminders to [facility]    Comments Patient has 2 mg Tablets Patient contact number [phone]  HIPAA INFO OK to leave messages on home or cell phone leave msg on cell [phone]  Goes to [facility] labs/results do not come to in-basket +++SEND POOL MESSAG    OK to speak byron Potts      Anticoagulation Care Providers     Provider Role Specialty Phone number    Danny Clark MD Responsible Hematology [phone]            See the Encounter Report to view Anticoagulation Flowsheet and Dosing Calendar (Go to Encounters tab in chart review, and find the Anticoagulation Therapy Visit)    Left message for patient with results and dosing recommendations. Asked patient to call back to report any missed doses, falls, signs and symptoms of bleeding or clotting, any changes in health, medication, or diet. Asked patient to call back with any questions or concerns.     Leticia Cho, MARCOS

## 2018-09-25 NOTE — MR AVS SNAPSHOT
Stacey Combs Glynn   9/25/2018   Anticoagulation Therapy Visit    Description:  38 year old female   Provider:  Leticia Cho, RN   Department:  [facility]           INR as of 9/25/2018     Today's INR 3.22! (9/24/2018)      Anticoagulation Summary as of 9/25/2018     INR goal 2.0-3.0   Today's INR 3.22! (9/24/2018)   Full warfarin instructions 4 mg every day   Next INR check 10/9/2018    Indications   Long-term (current) use of anticoagulants [Z79.01] [Z79.01]  Prothrombin gene mutation (H) [D68.52]         September 2018 Details    Sun Mon Tue Wed Thu Fri Sat           1                 2               3               4               5               6               7               8                 9               10               11               12               13               14               15                 16               17               18               19               20               21               22                 23               24               25      4 mg   See details      26      4 mg         27      4 mg         28      4 mg         29      4 mg           30      4 mg                Date Details   09/25 This INR check               How to take your warfarin dose     To take:  4 mg Take 2 of the 2 mg tablets.           October 2018 Details    Sun Mon Tue Wed Thu Fri Sat      1      4 mg         2      4 mg         3      4 mg         4      4 mg         5      4 mg         6      4 mg           7      4 mg         8      4 mg         9            10               11               12               13                 14               15               16               17               18               19               20                 21               22               23               24               25               26               27                 28               29               30               31                   Date Details   No additional details    Date of  next INR:  10/9/2018         How to take your warfarin dose     To take:  4 mg Take 2 of the 2 mg tablets.

## 2018-10-11 ASSESSMENT — ANXIETY QUESTIONNAIRES
GAD7 TOTAL SCORE: 6
1. FEELING NERVOUS, ANXIOUS, OR ON EDGE: SEVERAL DAYS
7. FEELING AFRAID AS IF SOMETHING AWFUL MIGHT HAPPEN: NOT AT ALL
3. WORRYING TOO MUCH ABOUT DIFFERENT THINGS: SEVERAL DAYS
4. TROUBLE RELAXING: SEVERAL DAYS
2. NOT BEING ABLE TO STOP OR CONTROL WORRYING: SEVERAL DAYS
GAD7 TOTAL SCORE: 6
5. BEING SO RESTLESS THAT IT IS HARD TO SIT STILL: NOT AT ALL
6. BECOMING EASILY ANNOYED OR IRRITABLE: MORE THAN HALF THE DAYS
7. FEELING AFRAID AS IF SOMETHING AWFUL MIGHT HAPPEN: NOT AT ALL

## 2018-10-12 ASSESSMENT — ANXIETY QUESTIONNAIRES: GAD7 TOTAL SCORE: 6

## 2018-10-25 ENCOUNTER — OFFICE VISIT (OUTPATIENT)
Dept: OBGYN | Facility: CLINIC | Age: 38
End: 2018-10-25
Attending: OBSTETRICS & GYNECOLOGY
Payer: COMMERCIAL

## 2018-10-25 VITALS
SYSTOLIC BLOOD PRESSURE: 157 MMHG | WEIGHT: 152.7 LBS | HEIGHT: 63 IN | DIASTOLIC BLOOD PRESSURE: 102 MMHG | HEART RATE: 86 BPM | BODY MASS INDEX: 27.05 KG/M2

## 2018-10-25 DIAGNOSIS — Z00.00 VISIT FOR PREVENTIVE HEALTH EXAMINATION: Primary | ICD-10-CM

## 2018-10-25 NOTE — PATIENT INSTRUCTIONS
Follow up with Dr. Barrett or Dr. Lange for high blood pressure    Continue to work on diet, exercise and cutting back on alcohol    Schedule mammogram    PREVENTIVE HEALTH RECOMMENDATIONS:   Most women need a yearly breast and pelvic exam.    A PAP screen, a test done DURING a pelvic exam, is NO longer recommended yearly.    March 2013, screening guidelines recommended by ACOG for PAP screen are:    1) First pap at age 21.    2) Pap every 3 years until age 30.    3) After age 30, pap every 3 years or Pap with HR HPV screen every 5 years until age 65.  4) Women do NOT need a vaginal Pap screen after a hysterectomy (surgical removal of the uterus) when they have not had cancer.    Exceptions:  1) Yearly pap if HIV+ or immunosuppressed secondary to organ transplant  2) PALOMO II-III continue routine screening for 20 years.    I encourage you continue looking for opportunities to choose a healthy lifestyle:       * Choose to eat a heart healthy diet. Check out the FOOD PLATE guidelines at: http://www.University of Connecticutplate.gov/ for helpful hints on weight and cholesterol management.  Balance your caloric intake with exercise to maintain a BMI in the 22 to 26 range. For bone health: Eat calcium-rich foods like yogurt, broccoli or take chewable calcium pills (500 to 600 mg) twice a day with food.       * Exercise for at least an average of 30 minutes a day, 5 days of the week. This will help you control your weight, release stress, and help prevent disease.      * Take a Vitamin D3 supplement daily fall through spring and during summer unless you [id number]' full body sun exposure to skin without sunscreen.      * DO wear sunscreen to prevent skin cancer after the first 15-30 minutes.      * Identify stressors in your life, find ways to release the stress, and, make time for yourself. PLEASE ask for help if mood changes last longer than two weeks.     * Limit alcohol to one drink per day.  No smoking.  Avoid second hand smoke. If you  smoke, ask for help to stop.       *  If you are in a sexual relationship, talk with your partner about possible infection risks and take action to protect yourself from exposure to a sexual infection.    Please request an infection screen for STIs (sexually transmitted infections) if you are less than age 26 OR believe that you may be at risk.     Get a flu shot each year. Get a tetanus shot every 10 years. EVERYONE needs a pertussis (Whooping cough) booster.    See your dentist twice a year for an exam and preventive care cleaning.     Consider the following screen tests:    1) cholesterol test every 5 years.     2) yearly mammogram after age 40 unless you have identified risks.    3) colonoscopy every 10 years after age 50 unless you have identified risks.    4) diabetes blood test screening if you are at risk for diabetes.      Additional information that you may also find helpful:  The Internet now gives us access to LOTS of information -- some of it helpful, research documented and also plenty of harmful, anecdotal information that may not pertain to your situtaion. Consider visiting the following websites for accurate health information:    www.vitamindcouncil.org/ : Info and ongoing research re Vitamin D    www.fairview.org : Up to date and easily searchable information on multiple topics.    www.medlineplus.gov : medication info, interactive tutorials, watch real surgeries online    www.cdc.gov : public health info, travel advisories, epidemics (H1N1)    www.jan/std.org: current research re diagnosis, treatment and prevention of sexually contacted infections.    www.health[facility] : [place] dept of heatl, public health issues in [place], N1N1    www.familydoctor.org : good info from the Academy of Family Physicians

## 2018-10-25 NOTE — MR AVS SNAPSHOT
"              After Visit Summary   10/25/2018    Stacey Maki    MRN: [id number]           Patient Information     Date Of Birth          1980        Visit Information        Provider Department      10/25/2018 1:45 PM Agnes Hernandez MD [facility]        Care Instructions    Follow up with Dr. Barrett or Dr. Lange for high blood pressure    Continue to work on diet, exercise and cutting back on alcohol    Schedule mammogram          Follow-ups after your visit        Who to contact     Please call your clinic at [phone] to:    Ask questions about your health    Make or cancel appointments    Discuss your medicines    Learn about your test results    Speak to your doctor            Additional Information About Your Visit        [employer] Information     [employer] gives you secure access to your electronic health record. If you see a primary care provider, you can also send messages to your care team and make appointments. If you have questions, please call your primary care clinic.  If you do not have a primary care provider, please call [phone] and they will assist you.      [employer] is an electronic gateway that provides easy, online access to your medical records. With [employer], you can request a clinic appointment, read your test results, renew a prescription or communicate with your care team.     To access your existing account, please contact your [facility] or call [phone] for assistance.        Care EveryWhere ID     This is your Care EveryWhere ID. This could be used by other organizations to access your [facility] medical records  [id number]        Your Vitals Were     Pulse Height Last Period BMI (Body Mass Index)          86 1.6 m (5' 3\") 10/12/2018 (Exact Date) 27.05 kg/m2         Blood Pressure from Last 3 Encounters:   10/25/18 (!) 157/102   10/10/17 147/89   10/06/16 126/89    Weight from Last 3 Encounters:   10/25/18 " 69.3 kg (152 lb 11.2 oz)   10/10/17 65.4 kg (144 lb 3.2 oz)   10/06/16 62.9 kg (138 lb 11.2 oz)              Today, you had the following     No orders found for display       Primary Care Provider Office Phone # Fax #    Ulices Guevara [phone]       [address] [address]        Equal Access to Services     CHER RODNEY : Hadii aad ku hadasho Soomaali, waaxda luqadaha, qaybta kaalmada adeegyada, waxay idiin hayaan adeeg kharash la'maxwelln . So [facility] [phone].    ATENCIÓN: Si domingola pascual, tiene a santos disposición servicios gratuitos de asistencia lingüística. [facility] [phone].    We comply with applicable federal civil rights laws and [place] laws. We do not discriminate on the basis of race, color, national origin, age, disability, sex, sexual orientation, or gender identity.            Thank you!     Thank you for choosing [facility]  for your care. Our goal is always to provide you with excellent care. Hearing back from our patients is one way we can continue to improve our services. Please take a few minutes to complete the written survey that you may receive in the mail after your visit with us. Thank you!             Your Updated Medication List - Protect others around you: Learn how to safely use, store and throw away your medicines at www.disposemymeds.org.          This list is accurate as of 10/25/18  2:17 PM.  Always use your most recent med list.                   Brand Name Dispense Instructions for use Diagnosis    ACETAMIN 500 MG tablet   Generic drug:  acetaminophen      Take 2 tablets by mouth every 6 hours as needed.        clindamycin 1 % lotion    CLEOCIN T    60 mL    Apply topically 2 times daily    Folliculitis       FLUoxetine 10 MG capsule    PROZAC    90 capsule    Take 2 capsules (20 mg) by mouth daily Starting with ovulation through first day of menses    PMDD (premenstrual dysphoric disorder)       warfarin 2 MG  tablet    COUMADIN    65 tablet    Take 6mgs on Wednesdays and 4mgs on all other days or  as directed by anticoagulation clinic.    Deep vein thrombosis (H)

## 2018-10-25 NOTE — LETTER
10/25/2018       RE: Stacey Maki  [address] [place] [address]     Dear Colleague,    Thank you for referring your patient, Stacey Maki, to the [facility] at [facility]. Please see a copy of my visit note below.        Progress Note    SUBJECTIVE:  Stacey Maki is an 38 year old, , who requests an Annual Preventive Exam.     Concerns today include: weight gain. Use of CBD oil. Has been struggling with ongoing stress about life with kids, worsening anxiety and using alcohol as a coping mechanism. Is now drinking 1 glass/wine per night, but recently cut back 2 weeks ago to 2-3 nights/week 1 glass. Feels is under control. Struggles with diet and exercise as well. Is wondering if CBD oil  Might be helpful for anxiety. Still taking fluoxetine and increasing in luteal phase. Has not done any therapy.     Otherwise, has regular menses. Slightly heavier, but is anticoagulated.     Menstrual History:  Menstrual History 10/10/2017 10/25/2018 10/25/2018   LAST MENSTRUAL PERIOD - 10/12/2018 -   Menarche age 11 - -   Period Cycle (Days) Q 28 days - 24   Period Duration (Days) 6 days - 5   Method of Contraception None - None   Period Pattern - - Regular   Menstrual Flow Heavy - -   Menstrual Control Tampon - -   Dysmenorrhea None - -   PMS Symptoms - - -   Reviewed Today Yes - Yes       Last    Lab Results   Component Value Date    PAP NIL 2014     History of abnormal Pap smear: NO - age 30-65 PAP every 5 years with negative HPV co-testing recommended      Mammogram current: no (patient will schedule)  Last Mammogram:   No results found.     Last Colonoscopy:  No results found for this or any previous visit.      HISTORY:    Current Outpatient Prescriptions on File Prior to Visit:  acetaminophen (ACETAMIN) 500 MG tablet Take 2 tablets by mouth every 6 hours as needed.   clindamycin (CLEOCIN T) 1 % lotion Apply topically 2 times  daily   FLUoxetine (PROZAC) 10 MG capsule Take 2 capsules (20 mg) by mouth daily Starting with ovulation through first day of menses   warfarin (COUMADIN) 2 MG tablet Take 6mgs on  and 4mgs on all other days or  as directed by anticoagulation clinic.     No current facility-administered medications on file prior to visit.   Allergies   Allergen Reactions     Benzoyl Peroxide Swelling     Swollen eyelids     Immunization History   Administered Date(s) Administered     Tdap (Adacel,Boostrix) 2011       Obstetric History       T2      L2     SAB0   TAB0   Ectopic0   Multiple0   Live Births2      Past Medical History:   Diagnosis Date     Arteriovenous malformation      Asthma      Chronic cerebral venous sinus thrombosis     diagnosed in , 2009 5wk preg.      Heart disease     mother     Hypertension 2016    mother     Idiopathic intracranial hypertension      Papilledema      Prothrombin gene mutation (H)     heterozygous type     Tension headache      Past Surgical History:   Procedure Laterality Date     cerebral angiogram      to discuss management of neural- based fistula      SECTION       ENT SURGERY      adnoids removed/tubes in ears at age 3     GYN SURGERY      C section x 2     IR INTRACRANIAL EMBOLIZATION RIGHT  2011     MRI last Nov.      NO HISTORY OF SURGERY  3/20/14    derm     VASCULAR SURGERY      fistula repaired in brain     Family History   Problem Relation Age of Onset     Cerebrovascular Disease Paternal Grandmother      Cancer - colorectal Paternal Grandfather      Colon Cancer Paternal Grandfather      Alcohol/Drug Father      Substance Abuse Father      Alcohol/Drug Maternal Grandmother      Cerebrovascular Disease Other      paternal uncle     Anxiety Disorder Mother      Depression Mother      Obesity Mother      Cancer No family hx of      no skin cancer     Skin Cancer No family hx of      Melanoma No family hx of      Social History  "    Social History     Marital status:      Spouse name: Edson     Number of children: 2     Years of education: 18     Occupational History      [employer]          Social History Main Topics     Smoking status: Never Smoker     Smokeless tobacco: Never Used     Alcohol use Yes      Comment: 2-4 drinks per month     Drug use: No     Sexual activity: Yes     Partners: Male     Birth control/ protection: Male Surgical      Comment: vasectomy     Other Topics Concern      Service No     Blood Transfusions No     Caffeine Concern No     magdy tea in am     Occupational Exposure No     Hobby Hazards No     Sleep Concern No     Stress Concern No     Weight Concern No     Special Diet No     Back Care No     Exercise No     Bike Helmet No     Seat Belt No     Self-Exams No     Social History Narrative    How much exercise per week? Walks 5 days a weekHow much calcium per day? Eats a lot of dairy products daily. 3-4 times   How much caffeine per day? 1 cup a dayHow much vitamin D per day? DietDo you/your family wear seatbelts?  YesDo you/your family use safety helmets? YesDo you/your family use sunscreen? YesDo you/your family keep firearms in the home? NoDo you/your family have a smoke detector(s)? YesDo you feel safe in your home? YesHas anyone ever touched you in an unwanted manner? No    Renetta Shaikh, [facility]    10/05/2015     PHQ-9 SCORE 10/5/2015 10/6/2016 8/17/2018   Total Score - - -   Total Score 2 8 4     NIKA-7 SCORE 10/5/2017 10/11/2018   Total Score 10 (moderate anxiety) 6 (mild anxiety)   Total Score 10 6         EXAM:  Blood pressure (!) 157/102, pulse 86, height 1.6 m (5' 3\"), weight 69.3 kg (152 lb 11.2 oz), last menstrual period 10/12/2018, not currently breastfeeding. Body mass index is 27.05 kg/(m^2).  General - pleasant female in no acute distress.  Skin - no suspicious lesions or rashes  EENT-  PERRLA, euthyroid with out palpable nodules  Neck - supple without " lymphadenopathy.  Lungs - clear to auscultation bilaterally.  Heart - regular rate and rhythm without murmur.  Abdomen - soft, nontender, nondistended, no masses or organomegaly noted.  Musculoskeletal - no gross deformities.  Neurological - normal strength, sensation, and mental status.    Breast Exam:  Breast: Without visible skin changes. No dimpling or lesions seen.   Breasts supple, non-tender with palpation, no dominant mass, nodularity, or nipple discharge noted bilaterally. Axillary nodes negative.      Pelvic Exam:  EG/BUS: Normal genital architecture without lesions, erythema or abnormal secretions Bartholin's, Urethra, [place]'s normal   Urethral meatus: normal   Urethra: no masses, tenderness, or scarring   Bladder: no masses or tenderness   Vagina: moist, pink, rugae with creamy, white and odorless  secretions  Cervix: no lesions  Uterus: anteverted,   Adnexa: Within normal limits and No masses, nodularity, tenderness  Rectum:anus normal       ASSESSMENT:  Encounter Diagnosis   Name Primary?     Visit for preventive health examination Yes        PLAN:   HTN: Recommend evaluation with Primary Care, as has multiple documented elevated BP. Will schedule.   Alcohol use: Discussed cutting back, will continue to follow.   Weight gain: Reviewed diet and lifestyle changes.   CBD oil: Discussed use with anxiety, however as not FDA regulated, unclear about quality of oils.   Will schedule mammogram.      Additional teaching done at this visit regarding exercise, mental health, weight/diet and alcohol use.    Return to clinic in one year.  Follow-up as needed.    Agnes Hernandez MD

## 2018-10-26 NOTE — PROGRESS NOTES
Progress Note    SUBJECTIVE:  Stacey Maki is an 38 year old, , who requests an Annual Preventive Exam.     Concerns today include: weight gain. Use of CBD oil. Has been struggling with ongoing stress about life with kids, worsening anxiety and using alcohol as a coping mechanism. Is now drinking 1 glass/wine per night, but recently cut back 2 weeks ago to 2-3 nights/week 1 glass. Feels is under control. Struggles with diet and exercise as well. Is wondering if CBD oil  Might be helpful for anxiety. Still taking fluoxetine and increasing in luteal phase. Has not done any therapy.     Otherwise, has regular menses. Slightly heavier, but is anticoagulated.     Menstrual History:  Menstrual History 10/10/2017 10/25/2018 10/25/2018   LAST MENSTRUAL PERIOD - 10/12/2018 -   Menarche age 11 - -   Period Cycle (Days) Q 28 days - 24   Period Duration (Days) 6 days - 5   Method of Contraception None - None   Period Pattern - - Regular   Menstrual Flow Heavy - -   Menstrual Control Tampon - -   Dysmenorrhea None - -   PMS Symptoms - - -   Reviewed Today Yes - Yes       Last    Lab Results   Component Value Date    PAP NIL 2014     History of abnormal Pap smear: NO - age 30-65 PAP every 5 years with negative HPV co-testing recommended      Mammogram current: no (patient will schedule)  Last Mammogram:   No results found.     Last Colonoscopy:  No results found for this or any previous visit.      HISTORY:    Current Outpatient Prescriptions on File Prior to Visit:  acetaminophen (ACETAMIN) 500 MG tablet Take 2 tablets by mouth every 6 hours as needed.   clindamycin (CLEOCIN T) 1 % lotion Apply topically 2 times daily   FLUoxetine (PROZAC) 10 MG capsule Take 2 capsules (20 mg) by mouth daily Starting with ovulation through first day of menses   warfarin (COUMADIN) 2 MG tablet Take 6mgs on  and 4mgs on all other days or  as directed by anticoagulation clinic.     No current facility-administered  medications on file prior to visit.   Allergies   Allergen Reactions     Benzoyl Peroxide Swelling     Swollen eyelids     Immunization History   Administered Date(s) Administered     Tdap (Adacel,Boostrix) 2011       Obstetric History       T2      L2     SAB0   TAB0   Ectopic0   Multiple0   Live Births2      Past Medical History:   Diagnosis Date     Arteriovenous malformation      Asthma      Chronic cerebral venous sinus thrombosis     diagnosed in , 2009 5wk preg.      Heart disease     mother     Hypertension 2016    mother     Idiopathic intracranial hypertension      Papilledema      Prothrombin gene mutation (H)     heterozygous type     Tension headache      Past Surgical History:   Procedure Laterality Date     cerebral angiogram      to discuss management of neural- based fistula      SECTION       ENT SURGERY  1983    adnoids removed/tubes in ears at age 3     GYN SURGERY      C section x 2     IR INTRACRANIAL EMBOLIZATION RIGHT  2011     MRI last Nov.      NO HISTORY OF SURGERY  3/20/14    derm     VASCULAR SURGERY      fistula repaired in brain     Family History   Problem Relation Age of Onset     Cerebrovascular Disease Paternal Grandmother      Cancer - colorectal Paternal Grandfather      Colon Cancer Paternal Grandfather      Alcohol/Drug Father      Substance Abuse Father      Alcohol/Drug Maternal Grandmother      Cerebrovascular Disease Other      paternal uncle     Anxiety Disorder Mother      Depression Mother      Obesity Mother      Cancer No family hx of      no skin cancer     Skin Cancer No family hx of      Melanoma No family hx of      Social History     Social History     Marital status:      Spouse name: Edson     Number of children: 2     Years of education: 18     Occupational History      [employer]          Social History Main Topics     Smoking status: Never Smoker     Smokeless tobacco: Never Used      "Alcohol use Yes      Comment: 2-4 drinks per month     Drug use: No     Sexual activity: Yes     Partners: Male     Birth control/ protection: Male Surgical      Comment: vasectomy     Other Topics Concern      Service No     Blood Transfusions No     Caffeine Concern No     magdy tea in am     Occupational Exposure No     Hobby Hazards No     Sleep Concern No     Stress Concern No     Weight Concern No     Special Diet No     Back Care No     Exercise No     Bike Helmet No     Seat Belt No     Self-Exams No     Social History Narrative    How much exercise per week? Walks 5 days a weekHow much calcium per day? Eats a lot of dairy products daily. 3-4 times   How much caffeine per day? 1 cup a dayHow much vitamin D per day? DietDo you/your family wear seatbelts?  YesDo you/your family use safety helmets? YesDo you/your family use sunscreen? YesDo you/your family keep firearms in the home? NoDo you/your family have a smoke detector(s)? YesDo you feel safe in your home? YesHas anyone ever touched you in an unwanted manner? No    Renetta Shaikh CMA    10/05/2015       ROS  [unfilled]  PHQ-9 SCORE 10/5/2015 10/6/2016 8/17/2018   Total Score - - -   Total Score 2 8 4     NIKA-7 SCORE 10/5/2017 10/11/2018   Total Score 10 (moderate anxiety) 6 (mild anxiety)   Total Score 10 6         EXAM:  Blood pressure (!) 157/102, pulse 86, height 1.6 m (5' 3\"), weight 69.3 kg (152 lb 11.2 oz), last menstrual period 10/12/2018, not currently breastfeeding. Body mass index is 27.05 kg/(m^2).  General - pleasant female in no acute distress.  Skin - no suspicious lesions or rashes  EENT-  PERRLA, euthyroid with out palpable nodules  Neck - supple without lymphadenopathy.  Lungs - clear to auscultation bilaterally.  Heart - regular rate and rhythm without murmur.  Abdomen - soft, nontender, nondistended, no masses or organomegaly noted.  Musculoskeletal - no gross deformities.  Neurological - normal strength, sensation, and mental " status.    Breast Exam:  Breast: Without visible skin changes. No dimpling or lesions seen.   Breasts supple, non-tender with palpation, no dominant mass, nodularity, or nipple discharge noted bilaterally. Axillary nodes negative.      Pelvic Exam:  EG/BUS: Normal genital architecture without lesions, erythema or abnormal secretions Bartholin's, Urethra, [place]'s normal   Urethral meatus: normal   Urethra: no masses, tenderness, or scarring   Bladder: no masses or tenderness   Vagina: moist, pink, rugae with creamy, white and odorless  secretions  Cervix: no lesions  Uterus: anteverted,   Adnexa: Within normal limits and No masses, nodularity, tenderness  Rectum:anus normal       ASSESSMENT:  Encounter Diagnosis   Name Primary?     Visit for preventive health examination Yes        PLAN:   HTN: Recommend evaluation with Primary Care, as has multiple documented elevated BP. Will schedule.   Alcohol use: Discussed cutting back, will continue to follow.   Weight gain: Reviewed diet and lifestyle changes.   CBD oil: Discussed use with anxiety, however as not FDA regulated, unclear about quality of oils.   Will schedule mammogram.      Additional teaching done at this visit regarding exercise, mental health, weight/diet and alcohol use.    Return to clinic in one year.  Follow-up as needed.    Agnes Hernandez MD      Answers for HPI/ROS submitted by the patient on 10/11/2018   NIKA 7 TOTAL SCORE: 6  PHQ-2 Score: 2

## 2018-10-29 ASSESSMENT — ENCOUNTER SYMPTOMS
HALLUCINATIONS: 0
NIGHT SWEATS: 1
BLOOD IN STOOL: 0
DYSPNEA ON EXERTION: 1
NECK PAIN: 1
NERVOUS/ANXIOUS: 1
DEPRESSION: 1
ORTHOPNEA: 0
FATIGUE: 1
RECTAL PAIN: 0
HYPOTENSION: 0
INCREASED ENERGY: 1
ARTHRALGIAS: 1
POLYPHAGIA: 1
BOWEL INCONTINENCE: 0
CHILLS: 0
ALTERED TEMPERATURE REGULATION: 1
SNORES LOUDLY: 1
DIARRHEA: 0
SHORTNESS OF BREATH: 1
PALPITATIONS: 0
SLEEP DISTURBANCES DUE TO BREATHING: 0
WHEEZING: 0
LIGHT-HEADEDNESS: 1
LEG PAIN: 1
INSOMNIA: 1
FEVER: 0
ABDOMINAL PAIN: 0
HYPERTENSION: 1
POSTURAL DYSPNEA: 0
EXERCISE INTOLERANCE: 0
POLYDIPSIA: 1
WEIGHT LOSS: 0
WEIGHT GAIN: 1
JOINT SWELLING: 0
JAUNDICE: 0
BLOATING: 1
NAUSEA: 1
PANIC: 0
SYNCOPE: 0
COUGH: 1
MYALGIAS: 1
VOMITING: 1
BACK PAIN: 1
MUSCLE WEAKNESS: 1
DECREASED APPETITE: 0
COUGH DISTURBING SLEEP: 1
DECREASED CONCENTRATION: 1
SPUTUM PRODUCTION: 0
HEARTBURN: 1
CONSTIPATION: 1
STIFFNESS: 1
MUSCLE CRAMPS: 1
HEMOPTYSIS: 0

## 2018-10-29 ASSESSMENT — ANXIETY QUESTIONNAIRES
GAD7 TOTAL SCORE: 3
5. BEING SO RESTLESS THAT IT IS HARD TO SIT STILL: NOT AT ALL
GAD7 TOTAL SCORE: 3
3. WORRYING TOO MUCH ABOUT DIFFERENT THINGS: SEVERAL DAYS
1. FEELING NERVOUS, ANXIOUS, OR ON EDGE: SEVERAL DAYS
6. BECOMING EASILY ANNOYED OR IRRITABLE: SEVERAL DAYS
7. FEELING AFRAID AS IF SOMETHING AWFUL MIGHT HAPPEN: NOT AT ALL
4. TROUBLE RELAXING: NOT AT ALL
7. FEELING AFRAID AS IF SOMETHING AWFUL MIGHT HAPPEN: NOT AT ALL
2. NOT BEING ABLE TO STOP OR CONTROL WORRYING: NOT AT ALL

## 2018-10-30 ENCOUNTER — HOSPITAL ENCOUNTER (OUTPATIENT)
Dept: LAB | Facility: CLINIC | Age: 38
Discharge: HOME | End: 2018-10-30
Attending: INTERNAL MEDICINE | Admitting: INTERNAL MEDICINE
Payer: COMMERCIAL

## 2018-10-30 DIAGNOSIS — Z79.01 LONG TERM CURRENT USE OF ANTICOAGULANT THERAPY: ICD-10-CM

## 2018-10-30 DIAGNOSIS — D68.52 PROTHROMBIN GENE MUTATION (H): ICD-10-CM

## 2018-10-30 LAB — INR PPP: 2.57 (ref 0.86–1.14)

## 2018-10-30 PROCEDURE — 85610 PROTHROMBIN TIME: CPT | Performed by: INTERNAL MEDICINE

## 2018-10-30 PROCEDURE — 36415 COLL VENOUS BLD VENIPUNCTURE: CPT | Performed by: INTERNAL MEDICINE

## 2018-10-30 ASSESSMENT — ANXIETY QUESTIONNAIRES: GAD7 TOTAL SCORE: 3

## 2018-10-31 ENCOUNTER — ANTICOAGULATION THERAPY VISIT (OUTPATIENT)
Dept: ANTICOAGULATION | Facility: CLINIC | Age: 38
End: 2018-10-31

## 2018-10-31 DIAGNOSIS — D68.52 PROTHROMBIN GENE MUTATION (H): ICD-10-CM

## 2018-10-31 NOTE — MR AVS SNAPSHOT
Stacey Combs Glynn   10/31/2018   Anticoagulation Therapy Visit    Description:  38 year old female   Provider:  Caesar Barrera RN   Department:  [facility]           INR as of 10/31/2018     Today's INR 2.57 (10/30/2018)      Anticoagulation Summary as of 10/31/2018     INR goal 2.0-3.0   Today's INR 2.57 (10/30/2018)   Full warfarin instructions 4 mg every day   Next INR check 11/12/2018    Indications   Long-term (current) use of anticoagulants [Z79.01] [Z79.01]  Prothrombin gene mutation (H) [D68.52]         October 2018 Details    Sun Mon Tue Wed Thu Fri Sat      1               2               3               4               5               6                 7               8               9               10               11               12               13                 14               15               16               17               18               19               20                 21               22               23               24               25               26               27                 28               29               30               31      4 mg   See details          Date Details   10/31 This INR check               How to take your warfarin dose     To take:  4 mg Take 2 of the 2 mg tablets.           November 2018 Details    Sun Mon Tue Wed Thu Fri Sat         1      4 mg         2      4 mg         3      4 mg           4      4 mg         5      4 mg         6      4 mg         7      4 mg         8      4 mg         9      4 mg         10      4 mg           11      4 mg         12            13               14               15               16               17                 18               19               20               21               22               23               24                 25               26               27               28               29               30                 Date Details   No additional details    Date of next INR:   11/12/2018         How to take your warfarin dose     To take:  4 mg Take 2 of the 2 mg tablets.

## 2018-10-31 NOTE — PROGRESS NOTES
ANTICOAGULATION FOLLOW-UP CLINIC VISIT    Patient Name:  Stacey Maki  Date:  10/31/2018  Contact Type:  Telephone    SUBJECTIVE:     Patient Findings     Positives No Problem Findings    Comments Left message for patient.           OBJECTIVE    INR   Date Value Ref Range Status   10/30/2018 2.57 (H) 0.86 - 1.14 Final       ASSESSMENT / PLAN  INR assessment THER    Recheck INR In: 2 WEEKS    INR Location Clinic      Anticoagulation Summary as of 10/31/2018     INR goal 2.0-3.0   Today's INR 2.57 (10/30/2018)   Warfarin maintenance plan 4 mg (2 mg x 2) every day   Full warfarin instructions 4 mg every day   Weekly warfarin total 28 mg   No change documented Caesar Barrera RN   Plan last modified Leticia Cho RN (9/25/2018)   Next INR check 11/12/2018   Priority INR   Target end date     Indications   Long-term (current) use of anticoagulants [Z79.01] [Z79.01]  Prothrombin gene mutation (H) [D68.52]         Anticoagulation Episode Summary     INR check location     Preferred lab     Send INR reminders to [facility]    Comments Patient has 2 mg Tablets Patient contact number [phone]  HIPAA INFO OK to leave messages on home or cell phone leave msg on cell [phone]  [facility] to [facility] labs/results do not come [facility]    OK to speak byron Potts      Anticoagulation Care Providers     Provider Role Specialty Phone number    Danny Clark MD Responsible Hematology [phone]            See the Encounter Report to view Anticoagulation Flowsheet and Dosing Calendar (Go to Encounters tab in chart review, and find the Anticoagulation Therapy Visit)    Left message for patient with results and dosing recommendations. Asked patient to call back to report any missed doses, falls, signs and symptoms of bleeding or clotting, any changes in health, medication, or diet. Asked patient to call back with any questions or concerns.    Caesar Barrera, RN

## 2018-11-03 ENCOUNTER — MYC REFILL (OUTPATIENT)
Dept: OBGYN | Facility: CLINIC | Age: 38
End: 2018-11-03

## 2018-11-03 DIAGNOSIS — F32.81 PMDD (PREMENSTRUAL DYSPHORIC DISORDER): ICD-10-CM

## 2018-11-05 ENCOUNTER — TELEPHONE (OUTPATIENT)
Dept: OBGYN | Facility: CLINIC | Age: 38
End: 2018-11-05

## 2018-11-05 DIAGNOSIS — F32.81 PMDD (PREMENSTRUAL DYSPHORIC DISORDER): ICD-10-CM

## 2018-11-05 RX ORDER — FLUOXETINE 10 MG/1
20 CAPSULE ORAL DAILY
Qty: 90 CAPSULE | Refills: 3 | Status: SHIPPED | OUTPATIENT
Start: 2018-11-05 | End: 2018-11-07

## 2018-11-05 NOTE — TELEPHONE ENCOUNTER
Prior Authorization Retail Medication Request    Medication/Dose: FLUoxetine (PROZAC) 10 MG capsule    Take 2 capsules (20 mg) by mouth daily Starting with ovulation through first day of menses    ICD code (if different than what is on RX):    PMDD (premenstrual dysphoric disorder) [F32.81]           Previously Tried and Failed:    Rationale:      Insurance Name:    Insurance ID:        Pharmacy Information (if different than what is on RX)  Name:  [facility]  Phone:  [phone]

## 2018-11-05 NOTE — TELEPHONE ENCOUNTER
Message from [employer]:  Original authorizing provider: MD Stacey Gonsales would like a refill of the following medications:  FLUoxetine (PROZAC) 10 MG capsule [Agnes Hernandez MD]    Preferred pharmacy: [facility] [address] [place] [address] E AT [facility] [address]    Comment:  I requested a renewal about 6 days ago from my pharmacy but still haven't received the renewal. Trying it on my own in case the fax requests from the pharmacy haven't gone through.

## 2018-11-06 NOTE — TELEPHONE ENCOUNTER
Central Prior Authorization Team   Phone: [phone]      PA Initiation    Medication: FLUoxetine (PROZAC) 10 MG capsule-PA Initiated  Insurance Company: [employer] - Phone [phone] Fax [phone]  Pharmacy Filling the Rx: [email or web address] [address] [place] [address] - [address] [place] [address] [address] AT [facility] [address]  Filling Pharmacy Phone: [phone]  Filling Pharmacy Fax: [phone]  Start Date: 11/6/2018

## 2018-11-06 NOTE — TELEPHONE ENCOUNTER
Pt takes 20mg prozac from ovulation through start of menses (14days) and takes 10mg all other days.  Spoke with Dr. David ART to send split dose based on use (some 20mg and some 10mg). Can also find out from patient how she has been taking this as has only been prescribed 10mg in past. (NOTE: prozac only comes in capsules) and order according to how she uses it.    Tried to reach Stacey but received voicemail.  Left message to call back to discuss options to have coverage for medication.

## 2018-11-07 RX ORDER — FLUOXETINE 10 MG/1
10 CAPSULE ORAL DAILY
Qty: 90 CAPSULE | Refills: 3 | Status: SHIPPED | OUTPATIENT
Start: 2018-11-07 | End: 2019-03-26 | Stop reason: ALTCHOICE

## 2018-11-07 NOTE — TELEPHONE ENCOUNTER
Spoke with Stacey. She states that she does take 10mg dailiy and adds another 10mg about 5 days a month during her period. She just got a refill at the pharmacy yesterday and insurance did cover it. She would like us to just send a new RX for 10mg daily and she will portion out her medications to cover her during the period. Rx re-sent.

## 2018-11-12 ENCOUNTER — OFFICE VISIT (OUTPATIENT)
Dept: INTERNAL MEDICINE | Facility: CLINIC | Age: 38
End: 2018-11-12
Attending: INTERNAL MEDICINE
Payer: COMMERCIAL

## 2018-11-12 VITALS — DIASTOLIC BLOOD PRESSURE: 100 MMHG | SYSTOLIC BLOOD PRESSURE: 157 MMHG | HEIGHT: 63 IN | BODY MASS INDEX: 27.05 KG/M2

## 2018-11-12 DIAGNOSIS — F32.A DEPRESSION, UNSPECIFIED DEPRESSION TYPE: ICD-10-CM

## 2018-11-12 DIAGNOSIS — F41.1 GAD (GENERALIZED ANXIETY DISORDER): ICD-10-CM

## 2018-11-12 DIAGNOSIS — I10 BENIGN ESSENTIAL HYPERTENSION: Primary | ICD-10-CM

## 2018-11-12 DIAGNOSIS — R30.0 DYSURIA: ICD-10-CM

## 2018-11-12 LAB
ALBUMIN UR-MCNC: NEGATIVE MG/DL
APPEARANCE UR: CLEAR
BILIRUB UR QL STRIP: NEGATIVE
COLOR UR AUTO: YELLOW
GLUCOSE UR STRIP-MCNC: NEGATIVE MG/DL
HGB UR QL STRIP: ABNORMAL
KETONES UR STRIP-MCNC: NEGATIVE MG/DL
LEUKOCYTE ESTERASE UR QL STRIP: ABNORMAL
NITRATE UR QL: NEGATIVE
PH UR STRIP: 6 PH (ref 5–7)
SP GR UR STRIP: <1.005 (ref 1–1.03)
UROBILINOGEN UR STRIP-ACNC: 0.2 EU/DL (ref 0.2–1)

## 2018-11-12 PROCEDURE — G0463 HOSPITAL OUTPT CLINIC VISIT: HCPCS | Mod: ZF

## 2018-11-12 PROCEDURE — 81003 URINALYSIS AUTO W/O SCOPE: CPT

## 2018-11-12 PROCEDURE — 87086 URINE CULTURE/COLONY COUNT: CPT | Performed by: INTERNAL MEDICINE

## 2018-11-12 PROCEDURE — 87186 SC STD MICRODIL/AGAR DIL: CPT | Performed by: INTERNAL MEDICINE

## 2018-11-12 PROCEDURE — 87088 URINE BACTERIA CULTURE: CPT | Performed by: INTERNAL MEDICINE

## 2018-11-12 RX ORDER — LISINOPRIL 10 MG/1
10 TABLET ORAL DAILY
Qty: 30 TABLET | Refills: 1 | Status: SHIPPED | OUTPATIENT
Start: 2018-11-12 | End: 2019-01-10

## 2018-11-12 RX ORDER — ESCITALOPRAM OXALATE 10 MG/1
10 TABLET ORAL DAILY
Qty: 90 TABLET | Refills: 3 | Status: SHIPPED | OUTPATIENT
Start: 2018-11-12 | End: 2019-10-14

## 2018-11-12 RX ORDER — NITROFURANTOIN 25; 75 MG/1; MG/1
100 CAPSULE ORAL 2 TIMES DAILY
Qty: 10 CAPSULE | Refills: 0 | Status: SHIPPED | OUTPATIENT
Start: 2018-11-12 | End: 2019-03-26

## 2018-11-12 NOTE — NURSING NOTE
Chief Complaint   Patient presents with     Hypertension     Pt here to discuss blood pressure concerns.   B/P average 158/103

## 2018-11-12 NOTE — PATIENT INSTRUCTIONS
For blood pressure:   --Continue to work on decreasing drinking  --Start lisinopril 10 mg  --Go to lab today.   --Work on increasing exercise  --Work on weight loss  --Work on stress reduction at home--meditation   --Follow-up in a month    For anxiety:   Stop Fluoxetine(Prozac)  Start Escitalopram (Lexapro)    For bladder infection:   Macrobid, 2x/day for 5 days

## 2018-11-12 NOTE — MR AVS SNAPSHOT
After Visit Summary   11/12/2018    Stacey Maki    MRN: [id number]           Patient Information     Date Of Birth          1980        Visit Information        Provider Department      11/12/2018 8:30 AM Stacey Rainey MD [facility]         Today's Diagnoses     Benign essential hypertension    -  1    Dysuria        NIKA (generalized anxiety disorder)        Depression, unspecified depression type          Care Instructions    For blood pressure:   --Continue to work on decreasing drinking  --Start lisinopril 10 mg  --Go to lab today.   --Work on increasing exercise  --Work on weight loss  --Work on stress reduction at home--meditation   --Follow-up in a month    For anxiety:   Stop Fluoxetine(Prozac)  Start Escitalopram (Lexapro)    For bladder infection:   Macrobid, 2x/day for 5 days          Follow-ups after your visit        Your next 10 appointments already scheduled     Nov 19, 2018  9:00 AM CST   MA SCREENING DIGITAL BILATERAL with [id number]   [facility] ([facility])    [address] [place] [address], 2nd [facility] [address] [phone]           How do I prepare for my exam? (Food and drink instructions) No Food and Drink Restrictions.  How do I prepare for my exam? (Other instructions) Do not use any powder, lotion or deodorant under your arms or on your breast. If you do, we will ask you to remove it before your exam.  What should I wear: Wear comfortable, two-piece clothing.  How long does the exam take: Most scans will take 15 minutes.  What should I bring: Bring any previous mammograms from other facilities or have them mailed to the breast center.  Do I need a :  No  is needed.  What do I need to tell my doctor: If you have any allergies, tell your care team.  What should I do after the exam: No restrictions, You may resume normal activities.  What is this test: This test is an  "x-ray of the breast to look for breast disease. The breast is pressed between two plates to flatten and spread the tissue. An X-ray is taken of the breast from different angles.  Who should I call with questions: If you have any questions, please call the Imaging Department where you will have your exam. Directions, parking instructions, and other information is available on our website, [email or web address].  Other information about my exam Three-dimensional (3D) mammograms are available at [facility] locations in [facility], [facility], [place], [facility] and [place].  Health locations include [place] and the [facility] in [place].  Benefits of 3D mammograms include * Improved rate of cancer detection * Decreases your chance of having to go back for more tests, which means fewer: * \"False-positive\" results (This means that there is an abnormal area but it isn't cancer.) * Invasive testing procedures, such as a biopsy or surgery * Can provide clearer images of the breast if you have dense breast tissue.  *3D mammography is an optional exam that anyone can have with a 2D mammogram. It doesn't replace or take the place of a 2D mammogram. 2D mammograms remain an effective screening test for all women.  Not all insurance companies cover the cost of a 3D mammogram. Check with your insurance. Three-dimensional (3D) mammograms are available at [facility] locations in [facility], [facility], [facility], and [place]. Health locations include [place] and [facility] in [place]. Benefits of 3D mammograms include: - Improved rate of cancer detection - Decreases your chance of having to go back for more tests, which means fewer: - \"False-positive\" results (This means that there is an abnormal area but it isn't cancer.) - Invasive testing procedures, such as a biopsy or surgery - Can provide clearer images of the " "breast if you have dense breast tissue. 3D mammography is an optional exam that anyone can have with a 2D mammogram. It doesn't replace or take the place of a 2D mammogram. 2D mammograms remain an effective screening test for all women.  Not all insurance companies cover the cost of a 3D mammogram. Check with your insurance.              Future tests that were ordered for you today     Open Future Orders        Priority Expected Expires Ordered    Routine UA with Micro Reflex to Culture Routine  11/12/2019 11/12/2018            Who to contact     Please call your clinic at [phone] to:    Ask questions about your health    Make or cancel appointments    Discuss your medicines    Learn about your test results    Speak to your doctor            Additional Information About Your Visit        [employer] Information     [employer] gives you secure access to your electronic health record. If you see a primary care provider, you can also send messages to your care team and make appointments. If you have questions, please call your primary care clinic.  If you do not have a primary care provider, please call [phone] and they will assist you.      [employer] is an electronic gateway that provides easy, online access to your medical records. With [employer], you can request a clinic appointment, read your test results, renew a prescription or communicate with your care team.     To access your existing account, please contact your [facility] or call [phone] for assistance.        Care EveryWhere ID     This is your Care EveryWhere ID. This could be used by other organizations to access your [facility] medical records  [id number]        Your Vitals Were     Height BMI (Body Mass Index)                1.6 m (5' 3\") 27.05 kg/m2           Blood Pressure from Last 3 Encounters:   11/12/18 (!) 157/100   10/25/18 (!) 157/102   10/10/17 147/89    Weight from Last 3 Encounters:   10/25/18 69.3 kg (152 " lb 11.2 oz)   10/10/17 65.4 kg (144 lb 3.2 oz)   10/06/16 62.9 kg (138 lb 11.2 oz)              We Performed the Following     Basic Metabolic Panel     Clinitek Urine Macroscopic Nursing POCT     Clinitek Urine Macroscopic POCT          Today's Medication Changes          These changes are accurate as of 11/12/18  9:26 AM.  If you have any questions, ask your nurse or doctor.               Start taking these medicines.        Dose/Directions    escitalopram 10 MG tablet   Commonly known as:  LEXAPRO   Used for:  NIKA (generalized anxiety disorder), Depression, unspecified depression type   Started by:  Stacey Rainey MD        Dose:  10 mg   Take 1 tablet (10 mg) by mouth daily   Quantity:  90 tablet   Refills:  3       lisinopril 10 MG tablet   Commonly known as:  PRINIVIL/ZESTRIL   Used for:  Benign essential hypertension   Started by:  Stacey Rainey MD        Dose:  10 mg   Take 1 tablet (10 mg) by mouth daily   Quantity:  30 tablet   Refills:  1       nitroFURantoin (macrocrystal-monohydrate) 100 MG capsule   Commonly known as:  MACROBID   Used for:  Dysuria   Started by:  Stacey Rainey MD        Dose:  100 mg   Take 1 capsule (100 mg) by mouth 2 times daily for 5 days   Quantity:  10 capsule   Refills:  0            Where to get your medicines      These medications were sent to [facility] [id number] [place] [address] AT [facility] OF [facility] [address]  [address] [place] [address] [address], [facility] [address]     Phone:  [phone]     escitalopram 10 MG tablet    lisinopril 10 MG tablet    nitroFURantoin (macrocrystal-monohydrate) 100 MG capsule                Primary Care Provider Office Phone # Fax #    Ulices Guevara [phone]       [address] [address]        Equal Access to Services     CHER RODNEY AH: Kylie London, delfino rojas, lorena mcnair, ryan young  laoli martinez. So [facility] [phone].    ATENCIÓN: Si habla pascual, tiene a santos disposición servicios gratuitos de asistencia lingüística. Caden wells [phone].    We comply with applicable federal civil rights laws and [place] laws. We do not discriminate on the basis of race, color, national origin, age, disability, sex, sexual orientation, or gender identity.            Thank you!     Thank you for choosing [facility]   for your care. Our goal is always to provide you with excellent care. Hearing back from our patients is one way we can continue to improve our services. Please take a few minutes to complete the written survey that you may receive in the mail after your visit with us. Thank you!             Your Updated Medication List - Protect others around you: Learn how to safely use, store and throw away your medicines at www.disposemymeds.org.          This list is accurate as of 11/12/18  9:26 AM.  Always use your most recent med list.                   Brand Name Dispense Instructions for use Diagnosis    ACETAMIN 500 MG tablet   Generic drug:  acetaminophen      Take 2 tablets by mouth every 6 hours as needed.        escitalopram 10 MG tablet    LEXAPRO    90 tablet    Take 1 tablet (10 mg) by mouth daily    NIKA (generalized anxiety disorder), Depression, unspecified depression type       FLUoxetine 10 MG capsule    PROZAC    90 capsule    Take 1 capsule (10 mg) by mouth daily    PMDD (premenstrual dysphoric disorder)       lisinopril 10 MG tablet    PRINIVIL/ZESTRIL    30 tablet    Take 1 tablet (10 mg) by mouth daily    Benign essential hypertension       nitroFURantoin (macrocrystal-monohydrate) 100 MG capsule    MACROBID    10 capsule    Take 1 capsule (100 mg) by mouth 2 times daily for 5 days    Dysuria       warfarin 2 MG tablet    COUMADIN    65 tablet    Take 6mgs on Wednesdays and 4mgs on all other days or  as directed by anticoagulation clinic.    Deep vein thrombosis (H)

## 2018-11-12 NOTE — PROGRESS NOTES
HPI:       Stacey Maki is a 38 year old female who presents for the following  Patient presents with: Hypertension    Stacey is here for evaluation of HTN. She was recently seen by Dr. Hernandez for her annual and her blood pressure was 157/102. She also notes that she has gained about 15-20 lbs in the past couple years. She notes that she has not been paying as much attention to her diet and drinking more alcohol in the recent months, having a couple drinks/night. She describes her diet as carb heavy, but not particularly high is sodium. She does not take NSAIDs, and never checks her blood pressure at home.     She will also notes that she has been having some increased anxiety.  A lot of this revolves around her kids, especially when they do not listen to her.  It is difficult for her when she asked him repeatedly to do something and they do not follow her instructions.  She also notes that she started having some depression around age 36, and was exercising months.  She was started on a low-dose of Prozac by Dr. Hernandez, but she is not certain that that is really helping her much at this point in time.  She does wonder whether or not her anxiety is contributing to her blood pressure being as high as it is.    Finally, she also has dysuria, and wonders if she has a UTI.     Past Medical History:   Diagnosis Date     Arteriovenous malformation      Asthma      Chronic cerebral venous sinus thrombosis     diagnosed in , 2009 5wk preg.      Heart disease     mother     Hypertension 2016    mother     Idiopathic intracranial hypertension      Papilledema      Prothrombin gene mutation (H)     heterozygous type     Tension headache      Past Surgical History:   Procedure Laterality Date     cerebral angiogram      to discuss management of neural- based fistula      SECTION       ENT SURGERY  1983    adnoids removed/tubes in ears at age 3     GYN SURGERY      C section x 2     IR INTRACRANIAL  EMBOLIZATION RIGHT  5/27/2011     MRI last Nov.      NO HISTORY OF SURGERY  3/20/14    derm     VASCULAR SURGERY      fistula repaired in brain     Family History   Problem Relation Age of Onset     Cerebrovascular Disease Paternal Grandmother      Cancer - colorectal Paternal Grandfather      Colon Cancer Paternal Grandfather      Alcohol/Drug Father      Substance Abuse Father      Alcohol/Drug Maternal Grandmother      Cerebrovascular Disease Other      paternal uncle     Anxiety Disorder Mother      Depression Mother      Obesity Mother      Hypertension Mother      Cancer No family hx of      no skin cancer     Skin Cancer No family hx of      Melanoma No family hx of      Social History   Substance Use Topics     Smoking status: Never Smoker     Smokeless tobacco: Never Used     Alcohol use Yes      Comment: 2-4 drinks per month   , 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  Patient is an established patient of this clinic. and I reviewed  Past Medical History, Family History and Social History..         Review of Systems:   Review of Systems     Constitutional:  Positive for weight gain, fatigue, night sweats, recent stressors and increased energy. Negative for fever, chills, weight loss, decreased appetite, height loss, post-operative complications, incisional pain, hallucinations, hyperactivity and confused.   HENT:  Negative for ear pain, hearing loss, tinnitus, nosebleeds, trouble swallowing, hoarse voice, mouth sores, sore throat, ear discharge, tooth pain, gum tenderness, taste disturbance, smell disturbance, hearing aid, bleeding gums, dry mouth, sinus pain, sinus congestion and neck mass.    Eyes:  Negative for double vision, pain, redness, eye pain, decreased vision, eye watering, eye bulging, eye dryness, flashing lights, spots, floaters, strabismus, tunnel vision, jaundice and eye  irritation.   Respiratory:   Positive for cough, shortness of breath, snores loudly, dyspnea on exertion and cough disturbing sleep. Negative for hemoptysis, sputum production, wheezing, sleep disturbances due to breathing and postural dyspnea.    Cardiovascular:  Positive for chest pain, dyspnea on exertion, hypertension, few scattered varicosities, leg pain and light-headedness. Negative for palpitations, orthopnea, fingers/toes turn blue, hypotension, syncope, history of heart murmur, pacemaker, sleep disturbances due to breathing, exercise intolerance and edema.   Gastrointestinal:  Positive for heartburn, nausea, vomiting, constipation and bloating. Negative for abdominal pain, diarrhea, blood in stool, melena, rectal pain, bowel incontinence, jaundice, coffee ground emesis and change in stool.   Genitourinary:  Negative for bladder incontinence, dysuria, urgency, hematuria, flank pain, vaginal discharge, difficulty urinating, genital sores, dyspareunia, decreased libido, nocturia, voiding less frequently, arousal difficulty, abnormal vaginal bleeding, excessive menstruation, menstrual changes, hot flashes, vaginal dryness and postmenopausal bleeding.   Musculoskeletal:  Positive for myalgias, back pain, arthralgias, stiffness, muscle cramps, neck pain and muscle weakness. Negative for joint swelling, bone pain and fracture.   Skin:  Negative for nail changes, itching, poor wound healing, rash, hair changes, skin changes, acne, warts, poor wound healing, scarring, flaky skin, Raynaud's phenomenon, sensitivity to sunlight and skin thickening.   Neurological:  Positive for light-headedness. Negative for dizziness, tingling, tremors, speech change, seizures, loss of consciousness, weakness, numbness, headaches, disturbances in coordination, extremity numbness, memory loss, difficulty walking and paralysis.   Endo/Heme:  Negative for anemia, swollen glands and bruises/bleeds easily.   Psychiatric/Behavioral:   "Positive for depression, decreased concentration and mood swings. Negative for hallucinations, memory loss and panic attacks.    Breast:  Negative for breast discharge, breast mass, breast pain and nipple retraction.   Endocrine:  Positive for altered temperature regulation, polyphagia and polydipsia.Negative for unwanted hair growth and change in facial hair.    I have personally reviewed and updated the ROS on the day of the visit.           Physical Exam:   BP (!) 157/100  Ht 1.6 m (5' 3\")  BMI 27.05 kg/m2  Body mass index is 27.05 kg/(m^2).  Vitals were reviewed       GENERAL APPEARANCE: healthy, alert and no distress     EYES: EOMI, PERRL     HENT: ear canals and TM's normal and nose and mouth without ulcers or lesions     NECK: no adenopathy, no asymmetry, masses, or scars and thyroid normal to palpation     RESP: lungs clear to auscultation - no rales, rhonchi or wheezes     CV: regular rates and rhythm, normal S1 S2, no S3 or S4 and no murmur, click or rub     ABDOMEN:  soft, nontender, no HSM or masses and bowel sounds normal     MS: extremities normal- no gross deformities noted, no evidence of inflammation in joints, FROM in all extremities.     SKIN: no suspicious lesions or rashes     NEURO: Normal strength and tone, sensory exam grossly normal, mentation intact and speech normal     PSYCH: mentation appears normal. and affect normal/bright     LYMPHATICS: No cervical adenopathy    PHQ-9 SCORE 10/5/2015 10/6/2016 8/17/2018   Total Score - - -   Total Score 2 8 4     NIKA-7 SCORE 10/5/2017 10/11/2018 10/29/2018   Total Score 10 (moderate anxiety) 6 (mild anxiety) 3 (minimal anxiety)   Total Score 10 6 3         Results:   Pending  Assessment and Plan     Stacey was seen today for hypertension.    Diagnoses and all orders for this visit:    Benign essential hypertension. Given that her BP is elevated to this extent, will start medication today, in addition to lifestyle modification. Will also check BMP. "   -     lisinopril (PRINIVIL/ZESTRIL) 10 MG tablet; Take 1 tablet (10 mg) by mouth daily  -     Basic Metabolic Panel; Future    Dysuria. UA positive, started macrobid, UC returned with  E coli sensitive to macrobid and Strep agalactiae, so added amoxicillin  -     Clinitek Urine Macroscopic POCT  -     nitroFURantoin, macrocrystal-monohydrate, (MACROBID) 100 MG capsule; Take 1 capsule (100 mg) by mouth 2 times daily for 5 days  -     Urine Culture Aerobic Bacterial  -     amoxicillin (AMOXIL) 500 MG capsule; Take 1 capsule (500 mg) by mouth 2 times daily for 3 days    NIKA (generalized anxiety disorder)  Depression, unspecified depression type. Will switch from Prozac to Lexapro  -     escitalopram (LEXAPRO) 10 MG tablet; Take 1 tablet (10 mg) by mouth daily    RTC in 4 weeks.     Options for treatment and follow-up care were reviewed with the patient. Stacey Maki engaged in the decision making process and verbalized understanding of the options discussed and agreed with the final plan.    Stacey Blue MD  Nov 12, 2018

## 2018-11-12 NOTE — LETTER
11/12/2018       RE: Stacey Maki  [address] [place] [address]     Dear Colleague,    Thank you for referring your patient, Stacey Maki, to the [facility]  at [facility]. Please see a copy of my visit note below.        HPI:       Stacey Maki is a 38 year old female who presents for the following  Patient presents with: Hypertension    Stacey is here for evaluation of HTN. She was recently seen by Dr. Hernandez for her annual and her blood pressure was 157/102. She also notes that she has gained about 15-20 lbs in the past couple years. She notes that she has not been paying as much attention to her diet and drinking more alcohol in the recent months, having a couple drinks/night. She describes her diet as carb heavy, but not particularly high is sodium. She does not take NSAIDs, and never checks her blood pressure at home.     She will also notes that she has been having some increased anxiety.  A lot of this revolves around her kids, especially when they do not listen to her.  It is difficult for her when she asked him repeatedly to do something and they do not follow her instructions.  She also notes that she started having some depression around age 36, and was exercising months.  She was started on a low-dose of Prozac by Dr. Hernandez, but she is not certain that that is really helping her much at this point in time.  She does wonder whether or not her anxiety is contributing to her blood pressure being as high as it is.    Finally, she also has dysuria, and wonders if she has a UTI.     Past Medical History:   Diagnosis Date     Arteriovenous malformation      Asthma      Chronic cerebral venous sinus thrombosis     diagnosed in 2007, 2nd 2009 5wk preg.      Heart disease     mother     Hypertension 2016    mother     Idiopathic intracranial hypertension      Papilledema      Prothrombin gene mutation (H)     heterozygous type  "    Tension headache      Past Surgical History:   Procedure Laterality Date     cerebral angiogram      to discuss management of neural- based fistula      SECTION       ENT SURGERY  1983    adnoids removed/tubes in ears at age 3     GYN SURGERY      C section x 2     IR INTRACRANIAL EMBOLIZATION RIGHT  2011     MRI last Nov.      NO HISTORY OF SURGERY  3/20/14    derm     VASCULAR SURGERY      fistula repaired in brain     Family History   Problem Relation Age of Onset     Cerebrovascular Disease Paternal Grandmother      Cancer - colorectal Paternal Grandfather      Colon Cancer Paternal Grandfather      Alcohol/Drug Father      Substance Abuse Father      Alcohol/Drug Maternal Grandmother      Cerebrovascular Disease Other      paternal uncle     Anxiety Disorder Mother      Depression Mother      Obesity Mother      Hypertension Mother      Cancer No family hx of      no skin cancer     Skin Cancer No family hx of      Melanoma No family hx of      Social History   Substance Use Topics     Smoking status: Never Smoker     Smokeless tobacco: Never Used     Alcohol use Yes      Comment: 2-4 drinks per month   , 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  Patient is an established patient of this clinic. and I reviewed  Past Medical History, Family History and Social History..         Physical Exam:   BP (!) 157/100  Ht 1.6 m (5' 3\")  BMI 27.05 kg/m2  Body mass index is 27.05 kg/(m^2).  Vitals were reviewed       GENERAL APPEARANCE: healthy, alert and no distress     EYES: EOMI, PERRL     HENT: ear canals and TM's normal and nose and mouth without ulcers or lesions     NECK: no adenopathy, no asymmetry, masses, or scars and thyroid normal to palpation     RESP: lungs clear to auscultation - no rales, rhonchi or wheezes     CV: regular rates and rhythm, normal S1 S2, no S3 or S4 and no " murmur, click or rub     ABDOMEN:  soft, nontender, no HSM or masses and bowel sounds normal     MS: extremities normal- no gross deformities noted, no evidence of inflammation in joints, FROM in all extremities.     SKIN: no suspicious lesions or rashes     NEURO: Normal strength and tone, sensory exam grossly normal, mentation intact and speech normal     PSYCH: mentation appears normal. and affect normal/bright     LYMPHATICS: No cervical adenopathy    PHQ-9 SCORE 10/5/2015 10/6/2016 8/17/2018   Total Score - - -   Total Score 2 8 4     NIKA-7 SCORE 10/5/2017 10/11/2018 10/29/2018   Total Score 10 (moderate anxiety) 6 (mild anxiety) 3 (minimal anxiety)   Total Score 10 6 3         Results:   Pending  Assessment and Plan     Stacey was seen today for hypertension.    Diagnoses and all orders for this visit:    Benign essential hypertension. Given that her BP is elevated to this extent, will start medication today, in addition to lifestyle modification. Will also check BMP.   -     lisinopril (PRINIVIL/ZESTRIL) 10 MG tablet; Take 1 tablet (10 mg) by mouth daily  -     Basic Metabolic Panel; Future    Dysuria. UA positive, started macrobid, UC returned with  E coli sensitive to macrobid and Strep agalactiae, so added amoxicillin  -     Clinitek Urine Macroscopic POCT  -     nitroFURantoin, macrocrystal-monohydrate, (MACROBID) 100 MG capsule; Take 1 capsule (100 mg) by mouth 2 times daily for 5 days  -     Urine Culture Aerobic Bacterial  -     amoxicillin (AMOXIL) 500 MG capsule; Take 1 capsule (500 mg) by mouth 2 times daily for 3 days    NIKA (generalized anxiety disorder)  Depression, unspecified depression type. Will switch from Prozac to Lexapro  -     escitalopram (LEXAPRO) 10 MG tablet; Take 1 tablet (10 mg) by mouth daily    RTC in 4 weeks.     Options for treatment and follow-up care were reviewed with the patient. Stacey Maki engaged in the decision making process and verbalized understanding of the  options discussed and agreed with the final plan.    Stacey Blue MD  Nov 12, 2018

## 2018-11-12 NOTE — LETTER
12/5/2018         Stacey Maki   [address] [place] [address]        Dear Ms. Maki:    Shamir Ibarra-Your urine grew two bacteria. One is sensitive to the macrobid, the other is not, so I am sending in a different antibiotic to your pharmacy.     Results for orders placed or performed in visit on 11/12/18   Clinitek Urine Macroscopic POCT   Result Value Ref Range    Color Urine Yellow     Appearance Urine Clear     Glucose Urine Negative NEG^Negative mg/dL    Bilirubin Urine Negative NEG^Negative    Ketones Urine Negative NEG^Negative mg/dL    Specific Gravity Urine <1.005 1.003 - 1.035    Blood Urine Moderate (A) NEG^Negative    pH Urine 6.0 5.0 - 7.0 pH    Protein Albumin Urine Negative NEG^Negative mg/dL    Urobilinogen Urine 0.2 0.2 - 1.0 EU/dL    Nitrite Urine Negative NEG^Negative    Leukocyte Esterase Urine Small (A) NEG^Negative   Urine Culture Aerobic Bacterial   Result Value Ref Range    Specimen Description Midstream Urine     Special Requests Specimen received in preservative     Culture Micro >100,000 colonies/mL  Escherichia coli   (A)     Culture Micro (A)      10,000 to 50,000 colonies/mL  Streptococcus agalactiae sero group B  Beta Hemolytic Streptococcus groups A and B are susceptible to ampicillin, penicillin,   vancomycin, and the cephalosporins.  Susceptibility testing is not routinely done on these   organisms isolated from urine.         Susceptibility    Escherichia coli - VIJAYA     AMPICILLIN <=2 Sensitive ug/mL     CEFAZOLIN* <=4 Sensitive ug/mL      * Cefazolin VIJAYA breakpoints are for the treatment of uncomplicated urinary tract infections.  For the treatment of systemic infections, please contact the laboratory for additional testing.     CEFOXITIN <=4 Sensitive ug/mL     CEFTAZIDIME <=1 Sensitive ug/mL     CEFTRIAXONE <=1 Sensitive ug/mL     CIPROFLOXACIN <=0.25 Sensitive ug/mL     GENTAMICIN <=1 Sensitive ug/mL     LEVOFLOXACIN <=0.12 Sensitive ug/mL     NITROFURANTOIN <=16  Sensitive ug/mL     TOBRAMYCIN <=1 Sensitive ug/mL     Trimethoprim/Sulfa <=1/19 Sensitive ug/mL     AMPICILLIN/SULBACTAM <=2 Sensitive ug/mL     Piperacillin/Tazo <=4 Sensitive ug/mL     CEFEPIME <=1 Sensitive ug/mL         Please note that test explanations are brief and do not reflect all diagnostic uses.  If you have any questions or concerns, please call the clinic at [phone].      Sincerely,      Trish Son sent on behalf of  Stacey Barrett MD

## 2018-11-14 ENCOUNTER — HOSPITAL ENCOUNTER (OUTPATIENT)
Dept: LAB | Facility: CLINIC | Age: 38
Discharge: HOME | End: 2018-11-14
Attending: INTERNAL MEDICINE | Admitting: INTERNAL MEDICINE
Payer: COMMERCIAL

## 2018-11-14 ENCOUNTER — HOSPITAL ENCOUNTER (OUTPATIENT)
Dept: LAB | Facility: CLINIC | Age: 38
End: 2018-11-14
Attending: INTERNAL MEDICINE
Payer: COMMERCIAL

## 2018-11-14 DIAGNOSIS — I10 BENIGN ESSENTIAL HYPERTENSION: ICD-10-CM

## 2018-11-14 DIAGNOSIS — Z79.01 LONG TERM CURRENT USE OF ANTICOAGULANT THERAPY: ICD-10-CM

## 2018-11-14 DIAGNOSIS — D68.52 PROTHROMBIN GENE MUTATION (H): ICD-10-CM

## 2018-11-14 LAB
ANION GAP SERPL CALCULATED.3IONS-SCNC: 4 MMOL/L (ref 3–14)
BACTERIA SPEC CULT: ABNORMAL
BACTERIA SPEC CULT: ABNORMAL
BUN SERPL-MCNC: 9 MG/DL (ref 7–30)
CALCIUM SERPL-MCNC: 8.7 MG/DL (ref 8.5–10.1)
CHLORIDE SERPL-SCNC: 105 MMOL/L (ref 94–109)
CO2 SERPL-SCNC: 29 MMOL/L (ref 20–32)
CREAT SERPL-MCNC: 0.78 MG/DL (ref 0.52–1.04)
GFR SERPL CREATININE-BSD FRML MDRD: 81 ML/MIN/1.7M2
GLUCOSE SERPL-MCNC: 113 MG/DL (ref 70–99)
INR PPP: 2.93 (ref 0.86–1.14)
Lab: ABNORMAL
POTASSIUM SERPL-SCNC: 4.3 MMOL/L (ref 3.4–5.3)
SODIUM SERPL-SCNC: 138 MMOL/L (ref 133–144)
SPECIMEN SOURCE: ABNORMAL

## 2018-11-14 PROCEDURE — 80048 BASIC METABOLIC PNL TOTAL CA: CPT | Performed by: INTERNAL MEDICINE

## 2018-11-14 PROCEDURE — 36415 COLL VENOUS BLD VENIPUNCTURE: CPT | Performed by: INTERNAL MEDICINE

## 2018-11-14 PROCEDURE — 85610 PROTHROMBIN TIME: CPT | Performed by: INTERNAL MEDICINE

## 2018-11-14 RX ORDER — AMOXICILLIN 500 MG/1
500 CAPSULE ORAL 2 TIMES DAILY
Qty: 6 CAPSULE | Refills: 0 | Status: SHIPPED | OUTPATIENT
Start: 2018-11-14 | End: 2019-03-26

## 2018-11-15 ENCOUNTER — ANTICOAGULATION THERAPY VISIT (OUTPATIENT)
Dept: ANTICOAGULATION | Facility: CLINIC | Age: 38
End: 2018-11-15

## 2018-11-15 DIAGNOSIS — D68.52 PROTHROMBIN GENE MUTATION (H): ICD-10-CM

## 2018-11-15 NOTE — MR AVS SNAPSHOT
Stacey Combs Glynn   11/15/2018   Anticoagulation Therapy Visit    Description:  38 year old female   Provider:  Leticia Cho, RN   Department:  [facility]           INR as of 11/15/2018     Today's INR 2.93 (11/14/2018)      Anticoagulation Summary as of 11/15/2018     INR goal 2.0-3.0   Today's INR 2.93 (11/14/2018)   Full warfarin instructions 4 mg every day   Next INR check 12/20/2018    Indications   Long-term (current) use of anticoagulants [Z79.01] [Z79.01]  Prothrombin gene mutation (H) [D68.52]         November 2018 Details    Sun Mon Tue Wed Thu Fri Sat         1               2               3                 4               5               6               7               8               9               10                 11               12               13               14               15      4 mg   See details      16      4 mg         17      4 mg           18      4 mg         19      4 mg         20      4 mg         21      4 mg         22      4 mg         23      4 mg         24      4 mg           25      4 mg         26      4 mg         27      4 mg         28      4 mg         29      4 mg         30      4 mg           Date Details   11/15 This INR check               How to take your warfarin dose     To take:  4 mg Take 2 of the 2 mg tablets.           December 2018 Details    Sun Mon Tue Wed Thu Fri Sat           1      4 mg           2      4 mg         3      4 mg         4      4 mg         5      4 mg         6      4 mg         7      4 mg         8      4 mg           9      4 mg         10      4 mg         11      4 mg         12      4 mg         13      4 mg         14      4 mg         15      4 mg           16      4 mg         17      4 mg         18      4 mg         19      4 mg         20            21               22                 23               24               25               26               27               28               29                  30               31                     Date Details   No additional details    Date of next INR:  12/20/2018         How to take your warfarin dose     To take:  4 mg Take 2 of the 2 mg tablets.

## 2018-11-15 NOTE — PROGRESS NOTES
ANTICOAGULATION FOLLOW-UP CLINIC VISIT    Patient Name:  Stacey Maki  Date:  11/15/2018  Contact Type:  Telephone    SUBJECTIVE:        OBJECTIVE    INR   Date Value Ref Range Status   11/14/2018 2.93 (H) 0.86 - 1.14 Final       ASSESSMENT / PLAN  No question data found.  Anticoagulation Summary as of 11/15/2018     INR goal 2.0-3.0   Today's INR 2.93 (11/14/2018)   Warfarin maintenance plan 4 mg (2 mg x 2) every day   Full warfarin instructions 4 mg every day   Weekly warfarin total 28 mg   No change documented Leticia Cho RN   Plan last modified Leticia Cho RN (9/25/2018)   Next INR check 12/20/2018   Priority INR   Target end date     Indications   Long-term (current) use of anticoagulants [Z79.01] [Z79.01]  Prothrombin gene mutation (H) [D68.52]         Anticoagulation Episode Summary     INR check location     Preferred lab     Send INR reminders to [facility]    Comments Patient has 2 mg Tablets Patient contact number [phone]  HIPAA INFO OK to leave messages on home or cell phone leave msg on cell [phone]  Goes to [facility] for labs/results do not come to in-basket +++SEND POOL MESSAG    OK to speak byron Potts      Anticoagulation Care Providers     Provider Role Specialty Phone number    Danny Clark MD Responsible Hematology [phone]            See the Encounter Report to view Anticoagulation Flowsheet and Dosing Calendar (Go to Encounters tab in chart review, and find the Anticoagulation Therapy Visit)    Left message for patient with results and dosing recommendations. Asked patient to call back to report any missed doses, falls, signs and symptoms of bleeding or clotting, any changes in health, medication, or diet. Asked patient to call back with any questions or concerns.     Leticia Cho, MARCOS     ADDENDUM from 1/21:  Pt phones on this date stating she will be in on 1/22 for labs.  An inbasket message is sent as a reminder.  Courtney RODRÍGUEZ

## 2018-11-16 ASSESSMENT — ENCOUNTER SYMPTOMS
PANIC: 0
POSTURAL DYSPNEA: 0
DYSPNEA ON EXERTION: 1
SMELL DISTURBANCE: 0
WHEEZING: 0
HYPOTENSION: 0
SKIN CHANGES: 0
ARTHRALGIAS: 1
EYE WATERING: 0
EXTREMITY NUMBNESS: 0
HEMOPTYSIS: 0
DIFFICULTY URINATING: 0
BOWEL INCONTINENCE: 0
TROUBLE SWALLOWING: 0
POLYPHAGIA: 1
LEG PAIN: 1
RECTAL PAIN: 0
DIARRHEA: 0
SINUS CONGESTION: 0
SYNCOPE: 0
BLOOD IN STOOL: 0
COUGH DISTURBING SLEEP: 1
JOINT SWELLING: 0
BRUISES/BLEEDS EASILY: 0
HOT FLASHES: 0
STIFFNESS: 1
MUSCLE WEAKNESS: 1
EYE REDNESS: 0
INSOMNIA: 1
VOMITING: 1
BREAST PAIN: 0
ABDOMINAL PAIN: 0
ORTHOPNEA: 0
NAIL CHANGES: 0
BREAST MASS: 0
NECK MASS: 0
FATIGUE: 1
HEADACHES: 0
BLOATING: 1
SEIZURES: 0
WEAKNESS: 0
JAUNDICE: 0
HYPERTENSION: 1
DEPRESSION: 1
EYE IRRITATION: 0
SINUS PAIN: 0
SPUTUM PRODUCTION: 0
NERVOUS/ANXIOUS: 1
NIGHT SWEATS: 1
EYE PAIN: 0
PALPITATIONS: 0
COUGH: 1
NECK PAIN: 1
DIZZINESS: 0
TASTE DISTURBANCE: 0
TINGLING: 0
WEIGHT GAIN: 1
NAUSEA: 1
HEMATURIA: 0
MEMORY LOSS: 0
PARALYSIS: 0
SWOLLEN GLANDS: 0
DECREASED LIBIDO: 0
LIGHT-HEADEDNESS: 1
SHORTNESS OF BREATH: 1
NUMBNESS: 0
ALTERED TEMPERATURE REGULATION: 1
HALLUCINATIONS: 0
POOR WOUND HEALING: 0
DECREASED APPETITE: 0
POLYDIPSIA: 1
SLEEP DISTURBANCES DUE TO BREATHING: 0
FEVER: 0
DISTURBANCES IN COORDINATION: 0
HEARTBURN: 1
MUSCLE CRAMPS: 1
HOARSE VOICE: 0
SPEECH CHANGE: 0
LOSS OF CONSCIOUSNESS: 0
SORE THROAT: 0
INCREASED ENERGY: 1
FLANK PAIN: 0
EXERCISE INTOLERANCE: 0
DYSURIA: 0
CONSTIPATION: 1
SNORES LOUDLY: 1
WEIGHT LOSS: 0
CHILLS: 0
MYALGIAS: 1
DOUBLE VISION: 0
TREMORS: 0
DECREASED CONCENTRATION: 1
BACK PAIN: 1

## 2018-11-19 ENCOUNTER — RADIANT APPOINTMENT (OUTPATIENT)
Dept: MAMMOGRAPHY | Facility: CLINIC | Age: 38
End: 2018-11-19
Payer: COMMERCIAL

## 2018-11-19 DIAGNOSIS — Z12.31 VISIT FOR SCREENING MAMMOGRAM: ICD-10-CM

## 2018-12-30 ENCOUNTER — TRANSFERRED RECORDS (OUTPATIENT)
Dept: HEALTH INFORMATION MANAGEMENT | Facility: CLINIC | Age: 38
End: 2018-12-30

## 2019-01-09 ENCOUNTER — MYC REFILL (OUTPATIENT)
Dept: INTERNAL MEDICINE | Facility: CLINIC | Age: 39
End: 2019-01-09

## 2019-01-09 DIAGNOSIS — I10 BENIGN ESSENTIAL HYPERTENSION: ICD-10-CM

## 2019-01-09 RX ORDER — LISINOPRIL 10 MG/1
10 TABLET ORAL DAILY
Qty: 30 TABLET | Refills: 1 | Status: CANCELLED | OUTPATIENT
Start: 2019-01-09

## 2019-01-09 RX ORDER — LISINOPRIL 10 MG/1
TABLET ORAL
Qty: 30 TABLET | Refills: 0 | Status: CANCELLED | OUTPATIENT
Start: 2019-01-09

## 2019-01-22 ENCOUNTER — HOSPITAL ENCOUNTER (OUTPATIENT)
Dept: LAB | Facility: CLINIC | Age: 39
Discharge: HOME | End: 2019-01-22
Attending: INTERNAL MEDICINE | Admitting: INTERNAL MEDICINE
Payer: COMMERCIAL

## 2019-01-22 DIAGNOSIS — Z79.01 LONG TERM CURRENT USE OF ANTICOAGULANT THERAPY: ICD-10-CM

## 2019-01-22 DIAGNOSIS — D68.52 PROTHROMBIN GENE MUTATION (H): ICD-10-CM

## 2019-01-22 LAB — INR PPP: 1.81 (ref 0.86–1.14)

## 2019-01-22 PROCEDURE — 85610 PROTHROMBIN TIME: CPT | Performed by: INTERNAL MEDICINE

## 2019-01-22 PROCEDURE — 36415 COLL VENOUS BLD VENIPUNCTURE: CPT | Performed by: INTERNAL MEDICINE

## 2019-01-23 ENCOUNTER — ANTICOAGULATION THERAPY VISIT (OUTPATIENT)
Dept: ANTICOAGULATION | Facility: CLINIC | Age: 39
End: 2019-01-23

## 2019-01-23 DIAGNOSIS — D68.52 PROTHROMBIN GENE MUTATION (H): ICD-10-CM

## 2019-01-23 NOTE — PROGRESS NOTES
ANTICOAGULATION FOLLOW-UP CLINIC VISIT    Patient Name:  Stacey Maki  Date:  2019  Contact Type:  Telephone    SUBJECTIVE:        OBJECTIVE    INR   Date Value Ref Range Status   2019 1.81 (H) 0.86 - 1.14 Final       ASSESSMENT / PLAN  INR assessment SUB    Recheck INR In: 2 WEEKS    INR Location Clinic      Anticoagulation Summary  As of 2019    INR goal:   2.0-3.0   TTR:   65.1 % (2.6 y)   INR used for dosin.81! (2019)   Warfarin maintenance plan:   4 mg (2 mg x 2) every day   Full warfarin instructions:   : 6 mg; Otherwise 4 mg every day   Weekly warfarin total:   28 mg   Plan last modified:   Leticia Cho RN (2018)   Next INR check:   2019   Priority:   INR   Target end date:       Indications    Long-term (current) use of anticoagulants [Z79.01] [Z79.01]  Prothrombin gene mutation (H) [D68.52]             Anticoagulation Episode Summary     INR check location:       Preferred lab:       Send INR reminders to:   [facility]    Comments:   Patient has 2 mg Tablets Patient contact number [phone]  HIPAA INFO OK to leave messages on home or cell phone leave msg on cell [phone]  Goes to [facility] for labs/results do not come to in[facility]basket +[facility]    OK to speak byron Potts      Anticoagulation Care Providers     Provider Role Specialty Phone number    Danny Clark MD Responsible Hematology [phone]            See the Encounter Report to view Anticoagulation Flowsheet and Dosing Calendar (Go to Encounters tab in chart review, and find the Anticoagulation Therapy Visit)      Left message for patient with results and dosing recommendations. Asked patient to call back to report any missed doses, falls, signs and symptoms of bleeding or clotting, any changes in health, medication, or diet. Asked patient to call back with any questions or concerns.      Adam Koch [facility]

## 2019-03-01 ENCOUNTER — HOSPITAL ENCOUNTER (OUTPATIENT)
Dept: LAB | Facility: CLINIC | Age: 39
Discharge: HOME | End: 2019-03-01
Attending: INTERNAL MEDICINE | Admitting: INTERNAL MEDICINE
Payer: COMMERCIAL

## 2019-03-01 ENCOUNTER — TELEPHONE (OUTPATIENT)
Dept: HEMATOLOGY | Facility: CLINIC | Age: 39
End: 2019-03-01

## 2019-03-01 DIAGNOSIS — D68.52 PROTHROMBIN GENE MUTATION (H): Primary | ICD-10-CM

## 2019-03-01 DIAGNOSIS — D68.52 PROTHROMBIN GENE MUTATION (H): ICD-10-CM

## 2019-03-01 LAB — INR PPP: 2.48 (ref 0.86–1.14)

## 2019-03-01 PROCEDURE — 36415 COLL VENOUS BLD VENIPUNCTURE: CPT | Performed by: INTERNAL MEDICINE

## 2019-03-01 PROCEDURE — 85610 PROTHROMBIN TIME: CPT | Performed by: INTERNAL MEDICINE

## 2019-03-01 NOTE — TELEPHONE ENCOUNTER
Stacey Maki  MRN:   [id number]     [facility] called requesting INR orders; last seen 2013 by Dr. Clark. Gave VO x 1 INR & patient to schedule appt! She has cancelled follow up appts in the past. She scheduled an appt 3/26 with MICHELA Moreira to discuss anticoagulation options.  Alina Garrett, RN, MSN -Nurse Clinician, [facility] [phone]

## 2019-03-04 ENCOUNTER — ANTICOAGULATION THERAPY VISIT (OUTPATIENT)
Dept: ANTICOAGULATION | Facility: CLINIC | Age: 39
End: 2019-03-04

## 2019-03-04 DIAGNOSIS — D68.52 PROTHROMBIN GENE MUTATION (H): ICD-10-CM

## 2019-03-04 NOTE — PROGRESS NOTES
ANTICOAGULATION FOLLOW-UP CLINIC VISIT    Patient Name:  Stacey Maki  Date:  3/4/2019  Contact Type:  Telephone    SUBJECTIVE:     Patient Findings     Comments:   Stacey has an appointment with Norman Albert on 3/26/19 to discuss other anticoagulants.           OBJECTIVE    INR   Date Value Ref Range Status   2019 2.48 (H) 0.86 - 1.14 Final       ASSESSMENT / PLAN  INR assessment THER    Recheck INR In: 3 WEEKS    INR Location Clinic      Anticoagulation Summary  As of 3/4/2019    INR goal:   2.0-3.0   TTR:   65.4 % (2.7 y)   INR used for dosin.48 (3/1/2019)   Warfarin maintenance plan:   4 mg (2 mg x 2) every day   Full warfarin instructions:   4 mg every day   Weekly warfarin total:   28 mg   Plan last modified:   Leticia Cho RN (2018)   Next INR check:   3/26/2019   Priority:   INR   Target end date:       Indications    Long-term (current) use of anticoagulants [Z79.01] [Z79.01]  Prothrombin gene mutation (H) [D68.52]             Anticoagulation Episode Summary     INR check location:       Preferred lab:       Send INR reminders to:   [facility]    Comments:   Patient has 2 mg Tablets Patient contact number [phone]  HIPAA INFO OK to leave messages on home or cell phone leave msg on cell [phone]  Goes to [facility] for labs/results do not come to in-basket +++SEND POOL MESSAG    OK to speak byron Potts      Anticoagulation Care Providers     Provider Role Specialty Phone number    Danny Clark MD Responsible Hematology [phone]            See the Encounter Report to view Anticoagulation Flowsheet and Dosing Calendar (Go to Encounters tab in chart review, and find the Anticoagulation Therapy Visit)    Left message for patient with results and dosing recommendations. Asked patient to call back to report any missed doses, falls, signs and symptoms of bleeding or clotting, any changes in health, medication, or diet. Asked patient to call back with any  questions or concerns.    Stacey has an appointment with Norman Albert on 3/26/19 to discuss other anticoagulants.        Merced Cadet RN

## 2019-03-12 DIAGNOSIS — I10 BENIGN ESSENTIAL HYPERTENSION: ICD-10-CM

## 2019-03-12 RX ORDER — LISINOPRIL 10 MG/1
TABLET ORAL
Qty: 30 TABLET | Refills: 0 | Status: CANCELLED | OUTPATIENT
Start: 2019-03-12

## 2019-03-13 ENCOUNTER — MYC MEDICAL ADVICE (OUTPATIENT)
Dept: INTERNAL MEDICINE | Facility: CLINIC | Age: 39
End: 2019-03-13

## 2019-03-13 DIAGNOSIS — I10 BENIGN ESSENTIAL HYPERTENSION: ICD-10-CM

## 2019-03-14 ENCOUNTER — TELEPHONE (OUTPATIENT)
Dept: ENDOCRINOLOGY | Facility: CLINIC | Age: 39
End: 2019-03-14

## 2019-03-14 RX ORDER — LISINOPRIL 10 MG/1
10 TABLET ORAL DAILY
Qty: 90 TABLET | Refills: 3 | Status: SHIPPED | OUTPATIENT
Start: 2019-03-14 | End: 2019-04-15

## 2019-03-14 RX ORDER — LISINOPRIL 10 MG/1
10 TABLET ORAL DAILY
Qty: 30 TABLET | Refills: 1 | OUTPATIENT
Start: 2019-03-14

## 2019-03-26 ENCOUNTER — OFFICE VISIT (OUTPATIENT)
Dept: HEMATOLOGY | Facility: CLINIC | Age: 39
End: 2019-03-26
Attending: PHYSICIAN ASSISTANT
Payer: COMMERCIAL

## 2019-03-26 VITALS
DIASTOLIC BLOOD PRESSURE: 84 MMHG | HEART RATE: 78 BPM | RESPIRATION RATE: 14 BRPM | WEIGHT: 156.7 LBS | TEMPERATURE: 98.1 F | OXYGEN SATURATION: 98 % | BODY MASS INDEX: 27.77 KG/M2 | SYSTOLIC BLOOD PRESSURE: 136 MMHG | HEIGHT: 63 IN

## 2019-03-26 DIAGNOSIS — Z79.01 LONG TERM CURRENT USE OF ANTICOAGULANT THERAPY: Primary | ICD-10-CM

## 2019-03-26 DIAGNOSIS — Z86.718 HISTORY OF CEREBRAL VENOUS SINUS THROMBOSIS: ICD-10-CM

## 2019-03-26 DIAGNOSIS — D68.52 PROTHROMBIN GENE MUTATION (H): ICD-10-CM

## 2019-03-26 PROCEDURE — 99213 OFFICE O/P EST LOW 20 MIN: CPT | Performed by: PHYSICIAN ASSISTANT

## 2019-03-26 PROCEDURE — G0463 HOSPITAL OUTPT CLINIC VISIT: HCPCS

## 2019-03-26 ASSESSMENT — MIFFLIN-ST. JEOR: SCORE: 1349.92

## 2019-03-26 ASSESSMENT — PAIN SCALES - GENERAL: PAINLEVEL: NO PAIN (0)

## 2019-03-26 NOTE — PATIENT INSTRUCTIONS
Thanks for coming in today!    For now, stay on anticoagulation keeping INR in the 2.0 to 3.0 range. Please call monitoring physician or Coumadin Clinic for any medication changes, illness, diet changes, bruising.  While on Coumadin, sources of Vit K (green leafy vegetables) are important for a healthy diet, but should be kept stable. [facility] Monitoring [facility] phone number is [phone].    We talked about the option of getting a home INR monitor.  You would still be connected with the Medication monitoring team.  The only difference would be that you would do your INR at home. Most of these machines are covered by insurance.    Finally we talked about the option of going on Xarelto - details below.    Please call Genevieve to let us know if you want to get a home monitor and stay on warfarin, or try Xarelto at [phone].    Call the [facility]  at [phone]   -If surgeries or procedures are planned.    -If off anticoagulation, please call during high risk times (long-distance travel, broken      bones or trauma, immobilization, surgery, pregnancy, or taking estrogen).   -Any new symptoms of DVT (deep vein thrombosis) or PE (pulmonary embolism)    -pain     -swelling     -redness    -warmth    -shortness of breath    -chest pain    -coughing up blood    Return to clinic yearly.    Your nurse clinician is Genevieve Lanza RN at phone number  [phone].  If she is unavailable and you have immediate concerns, please call [phone] and ask for a nurse.     Genevieve Lanza RN - Nurse Clinician - [facility] - [phone]                              [facility] [phone]    What is rivaroxaban (Xarelto  )? XARELTO  is a prescription medicine used to treat deep vein thrombosis (DVT) and pulmonary embolism, and to help reduce the risk of these conditions reoccurring.  It is also used to prevent clotting  after knee & hip replacement.  It is a direct Xa inhibitor (it stops one of the clotting proteins). Your diet (or Vitamin K intake) does not affect this medication. This drug was FDA approved to treat DVT in 2012, but has been used in [place] since 2008. Xarelto was FDA approved for atrial fibrillation in 2011.    How is Xarelto   usually dosed?  For treatment for a new clot, Xarelto  is dosed 15 mg twice daily with food for 21 days.  After that you take 20 mg once daily.  The dosing to prevent clots in your veins after surgery, long flights, or other high risk times: 10mg once daily.  How do I know if I am taking the right dose? Everyone takes the same dose, no matter their weight.  It was not studied in obese patients.  Currently there is no lab test to check the Xarelto  blood level.  However, such testing is being developed and will likely be available in the future.  What are the possible side effects of Xarelto  ?  The most common side effect of Xarelto  is bleeding (i.e. bleeding from gums, nosebleeds, heavier than normal menstrual bleeding, blood in urine or stool). This risk of bleeding is similar to other oral blood thinners.  Get emergency medical help if you have any of these signs of allergic reaction: hives; difficulty breathing; swelling of your face, lips, tongue, throat; or if you should experience any significant bleeding.   Other side effects include: headaches, feeling dizzy or weak  What if I have surgery or a procedure scheduled? Please call your doctor about holding this medication prior to surgeries, injections into your joints or spine, or other invasive procedures (i.e. endoscopy/colonoscopy if biopsy or polyp removal needed).  Please notify your dentist that you are taking a blood thinner, although certain procedures may not require holding your Xarelto .  Should I be worried that there is no reversal agent?  There is no reversal agent for Xarelto  (i.e. like plasma or Vitamin K for  Coumadin) However, it wears off fairly quickly. If emergent surgery is required or life-threatening bleeding should occur, blood products and medications that promote clotting may be given. Please get examined for potential bleeding after any trauma or head injury.  What should I tell my doctor before starting Xarelto ? Please tell your doctor about past bleeding problems, liver or kidney problems, if you are pregnant or breastfeeding, or taking the following medications: Ketoconazole, Itraconazole, Ritonavir, Lopinavir, Indinavir, Carbamazepine, Phenytoin, Rifampin, or Vilonia s wort.

## 2019-03-26 NOTE — PROGRESS NOTES
[facility]  [address] [place] [address]  Main: [phone], Fax: [phone]    Patient seen at: [facility] at [address] [place] [address].    Outpatient Visit Note:    Patient: Stacey Maki  MRN: [id number]  : 1980  MARTA: 2019    Reason:  History of cerebral venous sinus thrombosis on long term anticoagulation therapy. Here for her routine follow up clinic visit.     Clinical History Summary:  Stacey Maki is a 38-year-old woman with a history of cerebral venous sinus thrombosis, who returns today to review her long-term anticoagulation management plan. She was last seen by Dr. Danny Clark, staff hematologist back in  and has since failed to return for follow up visit. Please see Dr. Clark's note from in Dec 2011 for details of her history.      In summary, she presented with a cerebral venous sinus thrombosis in , which was estrogen-provoked.  She was anticoagulated in the usual manner and subsequently found to be heterozygous for the prothrombin gene mutation.  She did develop an arteriovenous fistula, which was successfully embolized in 2011.  She has been followed by Dr. Barragan [facility], and continues to have a significantly abnormal flow.  Please see Dr. Barragan's note from 2013 for details.  In summary, she has chronic left sigmoid sinus occlusion, as well as some occlusion in the upper part of the left internal jugular vein.  There is a dilated superficial vein that is now draining this sinus.  She does have flow through the right sigmoid sinus, but there are areas of stenosis and irregularity, including an area of stenosis in the high right jugular vein.      Because of her abnormal flow, there has been concern about her risk of recurrent thrombosis should she be taken off anticoagulation.  She has done very well on anticoagulation with good control of her  "INR  and no bleeding symptoms.  She is highly motivated and compliant.  Thus, she appears to be a good candidate for long-term anticoagulation from that perspective.      She is here today simply for re-evaluation, as it has been over a year since we last saw her.  Again, she is doing quite well and has had no bleeding issues.     Interim History:  In the past few years, she has done very well on Coumadin with her INR mostly within the range of 2.0-3.0. However, she admits that she at times delay going in for her INR lab tests. She reports that she is quite consistent with taking her Coumadin. Denies any bleeding complications. Denies any issues with frequent epistaxis. No issues with oral mucosal bleeding. Denies any hematuria or blood in stools. Denies heavy menstrual bleeding. She denies any headaches or any visual changes.     Medications, Allergies and PmHx:  All reviewed by this writer in the electronic medical record.    Social History and Family History:  Deferred.    ROS:  As above.     Objective:  Pleasant in no acute distress.  Vitals: /84 (BP Location: Right arm, Patient Position: Sitting, Cuff Size: Adult Regular)   Pulse 78   Temp 98.1  F (36.7  C) (Oral)   Resp 14   Ht 1.6 m (5' 3\")   Wt 71.1 kg (156 lb 11.2 oz)   SpO2 98%   BMI 27.76 kg/m    Exam:  Complete exam is not performed today.     Labs:  Component      Latest Ref Rng & Units 10/10/2017 2/16/2018 2/22/2018 2/26/2018   INR      0.86 - 1.14 2.10 (H) 4.74 (H) 5.02 (HH) 2.77 (H)     Component      Latest Ref Rng & Units 4/5/2018 5/23/2018 6/27/2018 8/6/2018   INR      0.86 - 1.14 2.70 (H) 1.73 (H) 2.28 (H) 3.06 (H)     Component      Latest Ref Rng & Units 9/24/2018 10/30/2018 11/14/2018 1/22/2019   INR      0.86 - 1.14 3.22 (H) 2.57 (H) 2.93 (H) 1.81 (H)     Component      Latest Ref Rng & Units 3/1/2019   INR      0.86 - 1.14 2.48 (H)     Assessment:  In summary, Stacey is a 38 year old female with a history of cerebral sinus " thrombosis back in 2007 that was estrogen provoked who has been on indefinite anticoagulation therapy with Coumadin after she was found to have abnormal cerebral venous flow, returns to clinic today for her overdue routine follow up clinic visit with this center. Her last visit with this center was dated back to 2013. She is interested in discussing about transitioning to one of the new oral anticoagulant agents (NOACs).    In general, Stacey has done well on Coumadin since we have last saw her in 2013. She has no bleeding complications and has no issues with recurrent venous thromboembolic events.     Diagnosis:  1. History of Cerebral Sinus Thrombosis.  2. Heterozygous Prothrombin Gene Mutation.  3. On chronic anticoagulation therapy with Coumadin.     Plan:  Stacey remains to be a good candidate to stay on indefinite anticoagulation therapy. I spent the majority of today's appointment discussing about the possibility of transitioning her to one of the NOAC's (specifically Xarelto). I explain to her that currently there are no large studies specifically study the effectiveness of NOAC's and long term prevention of cerebral venous sinus thrombosis. However, I also explain that there are some isolated case series reports showing promising effectiveness in treatment and prevention of cerebral venous sinus thrombosis using Xarelto.     I discuss the pros and cons about Xarelto vs Coumadin today and answer all her questions to her satisfaction. I explain that we do not have opposition for her to transition to Xarelto having her knowing the fact that there is currently no concrete data to support its use in cerebral venous sinus thrombosis prevention.     We also discuss about potentially obtaining a home monitor if she should decide on staying with Coumadin.     Stacey would like to discuss these options with her  and get back to us.     If she should decide to stay on Coumadin, her INR goal remains unchanged at  2.0-3.0. If she should decide to switch over to Xarelto, she is instructed to call for an prescription and instruction as to how to transition.    She knows to contact our center if she should need any invasive or surgical procedures or any unusual bleeding issues.    Otherwise, we will plan on seeing her back annually.     Genevieve Lanza, nurse clinician is present throughout the entire clinic visit.    Total Time Spent:  27 minutes, all 27 minutes was spent on face-to-face consultation with the patient in regard to her history of cerebral venous sinus thrombosis and anticoagulation therapy management.    Time IN: 15:08  Time OUT: 15:35      Norman Albert PA-C, MPAS  Physician Assistant  [facility].

## 2019-03-26 NOTE — NURSING NOTE
Stacey Maki is here for a follow up visit.  She is  being seen for long term anticoagulation status, not been seen in clinic since 2013.     Welcomed and introduced her to our center and provided her with a contact card with our contact phone numbers.    I asked her if she had any specific concerns that she wanted addressed.  She is intersted in discussing alternatives to warfarin; this was communicated those to the provider.  I did review which provider she is going to see today and the timing of seeing that provider.    Reviewed with patient  the signs, symptoms of and risk factors for venous thrombosis (VTE) and provided an educational  book lucila, which reviews these facts. This includes the following web links  for further information:  www.stoptheclot.org, www.clotconnect.org.    Medications and allergies were reviewed, updated and reconciled.    I thanked her for coming in and encouraged her to call with any concerns or questions.    Genevieve Lanza, RN - Nurse Clinician - [facility] - [phone]

## 2019-03-27 ENCOUNTER — HOSPITAL ENCOUNTER (OUTPATIENT)
Dept: LAB | Facility: CLINIC | Age: 39
Discharge: HOME | End: 2019-03-27
Attending: INTERNAL MEDICINE | Admitting: INTERNAL MEDICINE
Payer: COMMERCIAL

## 2019-03-27 DIAGNOSIS — D68.52 PROTHROMBIN GENE MUTATION (H): ICD-10-CM

## 2019-03-27 LAB — INR PPP: 1.79 (ref 0.86–1.14)

## 2019-03-27 PROCEDURE — 85610 PROTHROMBIN TIME: CPT | Performed by: INTERNAL MEDICINE

## 2019-03-28 ENCOUNTER — ANTICOAGULATION THERAPY VISIT (OUTPATIENT)
Dept: ANTICOAGULATION | Facility: CLINIC | Age: 39
End: 2019-03-28

## 2019-03-28 DIAGNOSIS — D68.52 PROTHROMBIN GENE MUTATION (H): ICD-10-CM

## 2019-03-28 DIAGNOSIS — Z79.01 LONG TERM CURRENT USE OF ANTICOAGULANT THERAPY: ICD-10-CM

## 2019-04-15 ENCOUNTER — OFFICE VISIT (OUTPATIENT)
Dept: INTERNAL MEDICINE | Facility: CLINIC | Age: 39
End: 2019-04-15
Attending: INTERNAL MEDICINE
Payer: COMMERCIAL

## 2019-04-15 VITALS
DIASTOLIC BLOOD PRESSURE: 72 MMHG | SYSTOLIC BLOOD PRESSURE: 104 MMHG | BODY MASS INDEX: 27.14 KG/M2 | WEIGHT: 153.2 LBS | HEART RATE: 81 BPM | HEIGHT: 63 IN

## 2019-04-15 DIAGNOSIS — I10 BENIGN ESSENTIAL HYPERTENSION: Primary | ICD-10-CM

## 2019-04-15 RX ORDER — LOSARTAN POTASSIUM 50 MG/1
50 TABLET ORAL DAILY
Qty: 90 TABLET | Refills: 3 | Status: SHIPPED | OUTPATIENT
Start: 2019-04-15 | End: 2019-12-17

## 2019-04-15 ASSESSMENT — MIFFLIN-ST. JEOR: SCORE: 1329.04

## 2019-04-15 NOTE — LETTER
"4/15/2019       RE: Stacey Maki  [address] [place] [address]     Dear Colleague,    Thank you for referring your patient, Stacey aMki, to the [facility]  at [facility]. Please see a copy of my visit note below.                        SUBJECTIVE:  Stacey Maki is a 39 year old female who comes in for f/u on HTN. She is on 10 mg lisinopril daily. She has had a dry cough. This started right after she started it. She did have a viral infection and it exacerbated it. It interrupts her while working as an .     Also the lexapro. Helping with anxt, but still worse before period. When she was on fluoxetine, she would take increased dose the week before her period.     Past Medical History:   Diagnosis Date     Arteriovenous malformation      Asthma      Chronic cerebral venous sinus thrombosis     diagnosed in 2007, 2nd 2009 5wk preg.      Heart disease     mother     Hypertension 2016    mother     Idiopathic intracranial hypertension      Papilledema      Prothrombin gene mutation (H)     heterozygous type     Tension headache          Medications and allergies reviewed by me today.     ROS:   Constitutional, HEENT, cardiovascular, pulmonary, gi and gu systems are negative, except as otherwise noted.    OBJECTIVE:    /72   Pulse 81   Ht 1.6 m (5' 3\")   Wt 69.5 kg (153 lb 3.2 oz)   BMI 27.14 kg/m      Wt Readings from Last 1 Encounters:   04/15/19 69.5 kg (153 lb 3.2 oz)     Gen: Pleasant female, in NAD  Resp: lungs CAB  CV: Heart RRR, no MRG  Ext: WWP, no LE edema  Neuro: AOX3, no focal deficits     ASSESSMENT/PLAN:    Stacey was seen today for follow up.    Diagnoses and all orders for this visit:    Benign essential hypertension   Stop Lisinopril  -     Start losartan (COZAAR) 50 MG tablet; Take 1 tablet (50 mg) by mouth daily   RTC in 4 wks for BP f/u.      Anxiety   Can trial increase Lexapro the week " before her period.     Pt should return to clinic for f/u with me in 1 month     Stacey Blue MD  04/15/19

## 2019-04-15 NOTE — PROGRESS NOTES
"                            SUBJECTIVE:  Stacey Maki is a 39 year old female who comes in for f/u on HTN. She is on 10 mg lisinopril daily. She has had a dry cough. This started right after she started it. She did have a viral infection and it exacerbated it. It interrupts her while working as an .     Also the lexapro. Helping with anxt, but still worse before period. When she was on fluoxetine, she would take increased dose the week before her period.     Past Medical History:   Diagnosis Date     Arteriovenous malformation      Asthma      Chronic cerebral venous sinus thrombosis     diagnosed in 2007, 2nd 2009 5wk preg.      Heart disease     mother     Hypertension 2016    mother     Idiopathic intracranial hypertension      Papilledema      Prothrombin gene mutation (H)     heterozygous type     Tension headache          Medications and allergies reviewed by me today.     ROS:   Constitutional, HEENT, cardiovascular, pulmonary, gi and gu systems are negative, except as otherwise noted.    OBJECTIVE:    /72   Pulse 81   Ht 1.6 m (5' 3\")   Wt 69.5 kg (153 lb 3.2 oz)   BMI 27.14 kg/m     Wt Readings from Last 1 Encounters:   04/15/19 69.5 kg (153 lb 3.2 oz)     Gen: Pleasant female, in NAD  Resp: lungs CAB  CV: Heart RRR, no MRG  Ext: WWP, no LE edema  Neuro: AOX3, no focal deficits     ASSESSMENT/PLAN:    Stacey was seen today for follow up.    Diagnoses and all orders for this visit:    Benign essential hypertension   Stop Lisinopril  -     Start losartan (COZAAR) 50 MG tablet; Take 1 tablet (50 mg) by mouth daily   RTC in 4 wks for BP f/u.      Anxiety   Can trial increase Lexapro the week before her period.     Pt should return to clinic for f/u with me in 1 month     Stacey Blue MD  04/15/19    "

## 2019-04-22 DIAGNOSIS — Z79.01 LONG TERM CURRENT USE OF ANTICOAGULANT THERAPY: Primary | ICD-10-CM

## 2019-04-22 RX ORDER — WARFARIN SODIUM 2 MG/1
TABLET ORAL
Qty: 65 TABLET | Refills: 1 | Status: SHIPPED | OUTPATIENT
Start: 2019-04-22 | End: 2019-06-25

## 2019-05-07 ENCOUNTER — HOSPITAL ENCOUNTER (OUTPATIENT)
Dept: LAB | Facility: CLINIC | Age: 39
Discharge: HOME | End: 2019-05-07
Attending: INTERNAL MEDICINE | Admitting: INTERNAL MEDICINE
Payer: COMMERCIAL

## 2019-05-07 DIAGNOSIS — D68.52 PROTHROMBIN GENE MUTATION (H): ICD-10-CM

## 2019-05-07 LAB — INR PPP: 1.85 (ref 0.86–1.14)

## 2019-05-07 PROCEDURE — 36415 COLL VENOUS BLD VENIPUNCTURE: CPT | Performed by: INTERNAL MEDICINE

## 2019-05-07 PROCEDURE — 85610 PROTHROMBIN TIME: CPT | Performed by: INTERNAL MEDICINE

## 2019-05-08 ENCOUNTER — ANTICOAGULATION THERAPY VISIT (OUTPATIENT)
Dept: ANTICOAGULATION | Facility: CLINIC | Age: 39
End: 2019-05-08

## 2019-05-08 DIAGNOSIS — Z79.01 LONG TERM CURRENT USE OF ANTICOAGULANT THERAPY: ICD-10-CM

## 2019-05-08 DIAGNOSIS — D68.52 PROTHROMBIN GENE MUTATION (H): ICD-10-CM

## 2019-05-08 NOTE — PROGRESS NOTES
ANTICOAGULATION FOLLOW-UP CLINIC VISIT    Patient Name:  Stacey Maki  Date:  2019  Contact Type:  Telephone    SUBJECTIVE:        OBJECTIVE    INR   Date Value Ref Range Status   2019 1.85 (H) 0.86 - 1.14 Final       ASSESSMENT / PLAN  No question data found.  Anticoagulation Summary  As of 2019    INR goal:   2.0-3.0   TTR:   63.0 % (2.9 y)   INR used for dosin.85! (2019)   Warfarin maintenance plan:   6 mg (2 mg x 3) every Wed; 4 mg (2 mg x 2) all other days   Full warfarin instructions:   6 mg every Wed; 4 mg all other days   Weekly warfarin total:   30 mg   Plan last modified:   Leticia hCo RN (2019)   Next INR check:   2019   Priority:   INR   Target end date:       Indications    Long term current use of anticoagulant therapy [Z79.01]  Prothrombin gene mutation (H) [D68.52]             Anticoagulation Episode Summary     INR check location:       Preferred lab:       Send INR reminders to:   [facility]    Comments:   Patient has 2 mg Tablets Patient contact number [phone]  HIPAA INFO OK to leave messages on home or cell phone leave msg on cell [phone]  Goes to [facility] for labs/results do not come to in-basket +++SEND POOL MESSAG    OK to speak byron Potts      Anticoagulation Care Providers     Provider Role Specialty Phone number    Danny Clark MD Responsible Hematology [phone]            See the Encounter Report to view Anticoagulation Flowsheet and Dosing Calendar (Go to Encounters tab in chart review, and find the Anticoagulation Therapy Visit)    Left message for patient with results and dosing recommendations. Asked patient to call back to report any missed doses, falls, signs and symptoms of bleeding or clotting, any changes in health, medication, or diet. Asked patient to call back with any questions or concerns.     Leticia Cho RN

## 2019-05-13 ENCOUNTER — MYC MEDICAL ADVICE (OUTPATIENT)
Dept: INTERNAL MEDICINE | Facility: CLINIC | Age: 39
End: 2019-05-13

## 2019-05-13 DIAGNOSIS — B00.1 COLD SORE: Primary | ICD-10-CM

## 2019-05-13 RX ORDER — VALACYCLOVIR HYDROCHLORIDE 500 MG/1
2000 TABLET, FILM COATED ORAL 2 TIMES DAILY
Qty: 8 TABLET | Refills: 5 | Status: SHIPPED | OUTPATIENT
Start: 2019-05-13 | End: 2019-11-01

## 2019-06-07 ENCOUNTER — HOSPITAL ENCOUNTER (OUTPATIENT)
Dept: LAB | Facility: CLINIC | Age: 39
Discharge: HOME | End: 2019-06-07
Attending: INTERNAL MEDICINE | Admitting: INTERNAL MEDICINE
Payer: COMMERCIAL

## 2019-06-07 DIAGNOSIS — D68.52 PROTHROMBIN GENE MUTATION (H): ICD-10-CM

## 2019-06-07 LAB — INR PPP: 2.2 (ref 0.86–1.14)

## 2019-06-07 PROCEDURE — 85610 PROTHROMBIN TIME: CPT | Performed by: INTERNAL MEDICINE

## 2019-06-07 PROCEDURE — 36415 COLL VENOUS BLD VENIPUNCTURE: CPT | Performed by: INTERNAL MEDICINE

## 2019-06-10 ENCOUNTER — ANTICOAGULATION THERAPY VISIT (OUTPATIENT)
Dept: ANTICOAGULATION | Facility: CLINIC | Age: 39
End: 2019-06-10

## 2019-06-10 DIAGNOSIS — Z79.01 LONG TERM CURRENT USE OF ANTICOAGULANT THERAPY: ICD-10-CM

## 2019-06-10 DIAGNOSIS — D68.52 PROTHROMBIN GENE MUTATION (H): ICD-10-CM

## 2019-06-10 NOTE — PROGRESS NOTES
ANTICOAGULATION FOLLOW-UP CLINIC VISIT    Patient Name:  Stacey Maki  Date:  6/10/2019  Contact Type:  Telephone    SUBJECTIVE:         OBJECTIVE    INR   Date Value Ref Range Status   2019 2.20 (H) 0.86 - 1.14 Final       ASSESSMENT / PLAN  INR assessment THER    Recheck INR In: 3 WEEKS    INR Location Clinic      Anticoagulation Summary  As of 6/10/2019    INR goal:   2.0-3.0   TTR:   62.8 % (3 y)   INR used for dosin.20 (2019)   Warfarin maintenance plan:   6 mg (2 mg x 3) every Wed; 4 mg (2 mg x 2) all other days   Full warfarin instructions:   6 mg every Wed; 4 mg all other days   Weekly warfarin total:   30 mg   No change documented:   Caesar Barrera RN   Plan last modified:   Leticia Cho RN (2019)   Next INR check:   2019   Priority:   INR   Target end date:       Indications    Long term current use of anticoagulant therapy [Z79.01]  Prothrombin gene mutation (H) [D68.52]             Anticoagulation Episode Summary     INR check location:       Preferred lab:       Send INR reminders to:    [facility]    Comments:   Patient has 2 mg Tablets Patient contact number [phone]  HIPAA INFO OK to leave messages on home or cell phone leave msg on cell [phone]  Goes to [facility] for labs/results do not come to in-basket +++SEND POOL MESSAG    OK to speak byron Potts      Anticoagulation Care Providers     Provider Role Specialty Phone number    Danny Clark MD Responsible Hematology [phone]            See the Encounter Report to view Anticoagulation Flowsheet and Dosing Calendar (Go to Encounters tab in chart review, and find the Anticoagulation Therapy Visit)    Left message for patient with results and dosing recommendations. Asked patient to call back to report any missed doses, falls, signs and symptoms of bleeding or clotting, any changes in health, medication, or diet. Asked patient to call back with any questions or  concerns.    Caesar Barrera, RN

## 2019-06-25 DIAGNOSIS — Z79.01 LONG TERM CURRENT USE OF ANTICOAGULANT THERAPY: ICD-10-CM

## 2019-06-25 RX ORDER — WARFARIN SODIUM 2 MG/1
TABLET ORAL
Qty: 65 TABLET | Refills: 5 | Status: SHIPPED | OUTPATIENT
Start: 2019-06-25 | End: 2020-01-03

## 2019-07-11 ENCOUNTER — HOSPITAL ENCOUNTER (OUTPATIENT)
Dept: LAB | Facility: CLINIC | Age: 39
Discharge: HOME | End: 2019-07-11
Attending: INTERNAL MEDICINE | Admitting: INTERNAL MEDICINE
Payer: COMMERCIAL

## 2019-07-11 DIAGNOSIS — D68.52 PROTHROMBIN GENE MUTATION (H): ICD-10-CM

## 2019-07-11 LAB — INR PPP: 2.28 (ref 0.86–1.14)

## 2019-07-11 PROCEDURE — 85610 PROTHROMBIN TIME: CPT | Performed by: INTERNAL MEDICINE

## 2019-07-11 PROCEDURE — 36415 COLL VENOUS BLD VENIPUNCTURE: CPT | Performed by: INTERNAL MEDICINE

## 2019-07-12 ENCOUNTER — ANTICOAGULATION THERAPY VISIT (OUTPATIENT)
Dept: ANTICOAGULATION | Facility: CLINIC | Age: 39
End: 2019-07-12

## 2019-07-12 DIAGNOSIS — D68.52 PROTHROMBIN GENE MUTATION (H): ICD-10-CM

## 2019-07-12 DIAGNOSIS — Z79.01 LONG TERM CURRENT USE OF ANTICOAGULANT THERAPY: ICD-10-CM

## 2019-07-12 NOTE — PROGRESS NOTES
ANTICOAGULATION FOLLOW-UP CLINIC VISIT    Patient Name:  Stacey Maki  Date:  2019  Contact Type:  Telephone    SUBJECTIVE:         OBJECTIVE    INR   Date Value Ref Range Status   2019 2.28 (H) 0.86 - 1.14 Final       ASSESSMENT / PLAN  INR assessment THER    Recheck INR In: 6 WEEKS    INR Location Clinic      Anticoagulation Summary  As of 2019    INR goal:   2.0-3.0   TTR:   63.9 % (3.1 y)   INR used for dosin.28 (2019)   Warfarin maintenance plan:   6 mg (2 mg x 3) every Wed; 4 mg (2 mg x 2) all other days   Full warfarin instructions:   6 mg every Wed; 4 mg all other days   Weekly warfarin total:   30 mg   Plan last modified:   Leticia Cho RN (2019)   Next INR check:   2019   Priority:   INR   Target end date:       Indications    Long term current use of anticoagulant therapy [Z79.01]  Prothrombin gene mutation (H) [D68.52]             Anticoagulation Episode Summary     INR check location:       Preferred lab:       Send INR reminders to:   [facility]    Comments:   Patient has 2 mg Tablets Patient contact number [phone]  HIPAA INFO OK to leave messages on home or cell phone leave msg on cell [phone]  Goes to [facility] for labs/results do not come to in-basket +++SEND POOL MESSAG    OK to speak byron Edson Delroy      Anticoagulation Care Providers     Provider Role Specialty Phone number    Danny Clark MD Responsible Hematology [phone]            See the Encounter Report to view Anticoagulation Flowsheet and Dosing Calendar (Go to Encounters tab in chart review, and find the Anticoagulation Therapy Visit)  Left message for patient with results and dosing recommendations. Asked patient to call back to report any missed doses, falls, signs and symptoms of bleeding or clotting, any changes in health, medication, or diet. Asked patient to call back with any questions or concerns.      Karina Khoury RN

## 2019-09-05 ENCOUNTER — HOSPITAL ENCOUNTER (OUTPATIENT)
Dept: LAB | Facility: CLINIC | Age: 39
Discharge: HOME | End: 2019-09-05
Attending: INTERNAL MEDICINE | Admitting: INTERNAL MEDICINE
Payer: COMMERCIAL

## 2019-09-05 DIAGNOSIS — D68.52 PROTHROMBIN GENE MUTATION (H): ICD-10-CM

## 2019-09-05 LAB — INR PPP: 2.44 (ref 0.86–1.14)

## 2019-09-05 PROCEDURE — 36415 COLL VENOUS BLD VENIPUNCTURE: CPT | Performed by: INTERNAL MEDICINE

## 2019-09-05 PROCEDURE — 85610 PROTHROMBIN TIME: CPT | Performed by: INTERNAL MEDICINE

## 2019-09-06 ENCOUNTER — ANTICOAGULATION THERAPY VISIT (OUTPATIENT)
Dept: ANTICOAGULATION | Facility: CLINIC | Age: 39
End: 2019-09-06

## 2019-09-06 DIAGNOSIS — D68.52 PROTHROMBIN GENE MUTATION (H): ICD-10-CM

## 2019-09-06 DIAGNOSIS — Z79.01 LONG TERM CURRENT USE OF ANTICOAGULANT THERAPY: ICD-10-CM

## 2019-09-06 NOTE — PROGRESS NOTES
ANTICOAGULATION FOLLOW-UP CLINIC VISIT    Patient Name:  Stacey Maki  Date:  2019  Contact Type:  Telephone    SUBJECTIVE:         OBJECTIVE    INR   Date Value Ref Range Status   2019 2.44 (H) 0.86 - 1.14 Final       ASSESSMENT / PLAN  INR assessment THER    Recheck INR In: 6 WEEKS    INR Location Clinic      Anticoagulation Summary  As of 2019    INR goal:   2.0-3.0   TTR:   65.6 % (3.3 y)   INR used for dosin.44 (2019)   Warfarin maintenance plan:   6 mg (2 mg x 3) every Wed; 4 mg (2 mg x 2) all other days   Full warfarin instructions:   6 mg every Wed; 4 mg all other days   Weekly warfarin total:   30 mg   Plan last modified:   Leticia Cho RN (2019)   Next INR check:   10/17/2019   Priority:   INR   Target end date:       Indications    Long term current use of anticoagulant therapy [Z79.01]  Prothrombin gene mutation (H) [D68.52]             Anticoagulation Episode Summary     INR check location:       Preferred lab:       Send INR reminders to:   [facility]    Comments:   Patient has 2 mg Tablets Patient contact number [phone]  HIPAA INFO OK to leave messages on home or cell phone leave msg on cell [phone]  Goes to [facility] for labs/results do not come to in-basket +++SEND POOL MESSAG    OK to speak byron Edson Delroy      Anticoagulation Care Providers     Provider Role Specialty Phone number    Danny Clark MD Responsible Hematology [phone]            See the Encounter Report to view Anticoagulation Flowsheet and Dosing Calendar (Go to Encounters tab in chart review, and find the Anticoagulation Therapy Visit)  Left message for patient with results and dosing recommendations. Asked patient to call back to report any missed doses, falls, signs and symptoms of bleeding or clotting, any changes in health, medication, or diet. Asked patient to call back with any questions or concerns.      Karina Khoury RN

## 2019-10-14 DIAGNOSIS — F32.A DEPRESSION, UNSPECIFIED DEPRESSION TYPE: ICD-10-CM

## 2019-10-14 DIAGNOSIS — F41.1 GAD (GENERALIZED ANXIETY DISORDER): ICD-10-CM

## 2019-10-16 ENCOUNTER — MYC REFILL (OUTPATIENT)
Dept: INTERNAL MEDICINE | Facility: CLINIC | Age: 39
End: 2019-10-16

## 2019-10-16 DIAGNOSIS — F32.A DEPRESSION, UNSPECIFIED DEPRESSION TYPE: ICD-10-CM

## 2019-10-16 DIAGNOSIS — F41.1 GAD (GENERALIZED ANXIETY DISORDER): ICD-10-CM

## 2019-10-16 RX ORDER — ESCITALOPRAM OXALATE 10 MG/1
TABLET ORAL
Qty: 90 TABLET | Refills: 0 | Status: SHIPPED | OUTPATIENT
Start: 2019-10-16 | End: 2019-12-30

## 2019-10-16 RX ORDER — ESCITALOPRAM OXALATE 10 MG/1
10 TABLET ORAL DAILY
Qty: 90 TABLET | Refills: 3 | Status: CANCELLED | OUTPATIENT
Start: 2019-10-16

## 2019-10-16 NOTE — TELEPHONE ENCOUNTER
Received refill request for Lexapro.  Has upcoming scheduled visit.  Refill sent to get through scheduled visit.

## 2019-10-16 NOTE — TELEPHONE ENCOUNTER
Health Call Center    Phone Message    May a detailed message be left on voicemail: yes    Reason for Call: Medication Question or concern regarding medication   Prescription Clarification  Name of Medication: escitalopram (LEXAPRO) 10 MG tablet     Prescribing Provider: Stacey Rainey MD    Pharmacy: [id number] [address] [facility] [address]   What on the order needs clarification?   The patient will be going out of town this afternoon and she wanted to know if this order can be processed asap please call pharmacy with updates.          Action Taken: Message routed to:  [facility] pcc

## 2019-11-01 ENCOUNTER — OFFICE VISIT (OUTPATIENT)
Dept: OBGYN | Facility: CLINIC | Age: 39
End: 2019-11-01
Attending: OBSTETRICS & GYNECOLOGY
Payer: COMMERCIAL

## 2019-11-01 ENCOUNTER — ANTICOAGULATION THERAPY VISIT (OUTPATIENT)
Dept: ANTICOAGULATION | Facility: CLINIC | Age: 39
End: 2019-11-01

## 2019-11-01 VITALS
WEIGHT: 143 LBS | DIASTOLIC BLOOD PRESSURE: 70 MMHG | BODY MASS INDEX: 25.34 KG/M2 | SYSTOLIC BLOOD PRESSURE: 93 MMHG | HEIGHT: 63 IN | HEART RATE: 85 BPM

## 2019-11-01 DIAGNOSIS — D68.52 PROTHROMBIN GENE MUTATION (H): ICD-10-CM

## 2019-11-01 DIAGNOSIS — Z79.01 LONG TERM CURRENT USE OF ANTICOAGULANT THERAPY: ICD-10-CM

## 2019-11-01 DIAGNOSIS — Z00.00 PREVENTATIVE HEALTH CARE: Primary | ICD-10-CM

## 2019-11-01 LAB — INR PPP: 2.52 (ref 0.86–1.14)

## 2019-11-01 PROCEDURE — 85610 PROTHROMBIN TIME: CPT | Performed by: INTERNAL MEDICINE

## 2019-11-01 PROCEDURE — 36415 COLL VENOUS BLD VENIPUNCTURE: CPT | Performed by: INTERNAL MEDICINE

## 2019-11-01 PROCEDURE — G0145 SCR C/V CYTO,THINLAYER,RESCR: HCPCS | Performed by: OBSTETRICS & GYNECOLOGY

## 2019-11-01 PROCEDURE — 87624 HPV HI-RISK TYP POOLED RSLT: CPT | Performed by: OBSTETRICS & GYNECOLOGY

## 2019-11-01 ASSESSMENT — ANXIETY QUESTIONNAIRES
7. FEELING AFRAID AS IF SOMETHING AWFUL MIGHT HAPPEN: NOT AT ALL
3. WORRYING TOO MUCH ABOUT DIFFERENT THINGS: NOT AT ALL
5. BEING SO RESTLESS THAT IT IS HARD TO SIT STILL: NOT AT ALL
6. BECOMING EASILY ANNOYED OR IRRITABLE: MORE THAN HALF THE DAYS
2. NOT BEING ABLE TO STOP OR CONTROL WORRYING: NOT AT ALL
GAD7 TOTAL SCORE: 4
1. FEELING NERVOUS, ANXIOUS, OR ON EDGE: MORE THAN HALF THE DAYS

## 2019-11-01 ASSESSMENT — PATIENT HEALTH QUESTIONNAIRE - PHQ9
5. POOR APPETITE OR OVEREATING: NOT AT ALL
SUM OF ALL RESPONSES TO PHQ QUESTIONS 1-9: 4

## 2019-11-01 ASSESSMENT — MIFFLIN-ST. JEOR: SCORE: 1282.77

## 2019-11-01 NOTE — PROGRESS NOTES
SUBJECTIVE   Stacey Maki is a 39 year old , Patient's last menstrual period was 10/16/2019., with a history of  here for an annual preventive exam.    Specific concerns today: none    She is doing well. She started weight watchers in September and has lost 15 lbs. She is also going to classes at the . She previously had issues with depression and now feels much better and more energized.     LMP was 10/16. She gets her periods about once every 28 days. Periods are about 5 days and she has heavy flow for about 1.5 days where she has to use Ultra tampons and change every 1.5 hours. She is not currently on birth control as her  had a vasectomy. She has a history of a clotting disorder and is on warfarin.     Her mother was recently diagnosed with Stage 0 breast cancer at age 62. Pt denies breast lumps or nipple discharge.     She denies abnormal vaginal bleeding, discharge, pelvic pain, dyspareunia, or bowel or bladder changes.       Gynecologic History  Patient's last menstrual period was 10/16/2019.   Menstrual History:  Menstrual History 10/25/2018 2019 2019   LAST MENSTRUAL PERIOD - 10/16/2019 -   Menarche Age - - -   Period Cycle (Days) 24 - 28   Period Duration (Days) 5 - 5   Method of Contraception None - None   Period Pattern Regular - Regular   Menstrual Flow - - Moderate;Light   Menstrual Control - - -   Dysmenorrhea - - -   PMS Symptoms - - -   Reviewed Today Yes - Yes     Current contraception:  had vasectomy   Desires pregnancy within 12 months: N  Number of partners in last year: 1--  Denies history of STI    Lab Results   Component Value Date    PAP NIL 2014      History of abnormal Pap smear: No  HPV vaccine: no data found    Obstetric History  OB History    Para Term  AB Living   2 2 2 0 0 2   SAB TAB Ectopic Multiple Live Births   0 0 0 0 2      # Outcome Date GA Lbr Sam/2nd Weight Sex Delivery Anes PTL Lv   2 Term 11/18/10 38w0d  2.92  kg (6 lb 7 oz) M CS-LTranv Gen  DONN      Birth Comments: CAN, blood clot in brain, 5wks ,       Name: Derek Vasquez Term 06 40w5d  2.948 kg (6 lb 8 oz) M CS-LTranv   DONN      Birth Comments: dil to 10, FTD, mec.distress after AROM true knot       Name: Tim        Health Maintenance  Immunization History   Administered Date(s) Administered     Tdap (Adacel,Boostrix) 2011     Health Maintenance   Topic Date Due     DEPRESSION ACTION PLAN  1978     OP ANNUAL INR REFERRAL  10/06/2016     PHQ-9  2019     INFLUENZA VACCINE (1) 2019     HPV  2019     PAP  2019     PREVENTIVE CARE VISIT  10/25/2019     DTAP/TDAP/TD IMMUNIZATION (2 - Td) 2021     HIV SCREENING  Completed     IPV IMMUNIZATION  Aged Out     MENINGITIS IMMUNIZATION  Aged Out     Lab Results   Component Value Date    CHOL 202 10/10/2017     Lab Results   Component Value Date    HDL 79 10/10/2017     Lab Results   Component Value Date     10/10/2017     Lab Results   Component Value Date    TSH 1.73 10/10/2017     Mammogram: 2018, BI RADS 1 (negative)    Past Medical History  Past Medical History:   Diagnosis Date     Arteriovenous malformation      Asthma      Chronic cerebral venous sinus thrombosis     diagnosed in , 2nd  5wk preg.      Heart disease     mother     Hypertension 2016    mother     Idiopathic intracranial hypertension      Papilledema      Prothrombin gene mutation (H)     heterozygous type     Tension headache        Past Surgical History  Past Surgical History:   Procedure Laterality Date     cerebral angiogram      to discuss management of neural- based fistula      SECTION       ENT SURGERY  1983    adnoids removed/tubes in ears at age 3     GYN SURGERY      C section x 2     IR INTRACRANIAL EMBOLIZATION RIGHT  2011     MRI last Nov.      NO HISTORY OF SURGERY  3/20/14    derm     VASCULAR SURGERY      fistula repaired in brain       Medications  Current  Outpatient Medications   Medication     acetaminophen (ACETAMIN) 500 MG tablet     escitalopram (LEXAPRO) 10 MG tablet     losartan (COZAAR) 50 MG tablet     warfarin (COUMADIN) 2 MG tablet     warfarin (COUMADIN) 2 MG tablet     No current facility-administered medications for this visit.        Allergies     Allergies   Allergen Reactions     Benzoyl Peroxide Swelling     Swollen eyelids       Social History  Social History     Socioeconomic History     Marital status:      Spouse name: Edson     Number of children: 2     Years of education: 18     Highest education level: Not on file   Occupational History     Employer: [employer]     Comment:    Social Needs     Financial resource strain: Not on file     Food insecurity:     Worry: Not on file     Inability: Not on file     Transportation needs:     Medical: Not on file     Non-medical: Not on file   Tobacco Use     Smoking status: Never Smoker     Smokeless tobacco: Never Used   Substance and Sexual Activity     Alcohol use: Yes     Comment: 2-4 drinks per month     Drug use: No     Sexual activity: Yes     Partners: Male     Birth control/protection: Male Surgical     Comment: vasectomy   Lifestyle     Physical activity:     Days per week: Not on file     Minutes per session: Not on file     Stress: Not on file   Relationships     Social connections:     Talks on phone: Not on file     Gets together: Not on file     Attends Hindu service: Not on file     Active member of club or organization: Not on file     Attends meetings of clubs or organizations: Not on file     Relationship status: Not on file     Intimate partner violence:     Fear of current or ex partner: Not on file     Emotionally abused: Not on file     Physically abused: Not on file     Forced sexual activity: Not on file   Other Topics Concern     Parent/sibling w/ CABG, MI or angioplasty before 65F 55M? Not Asked      Service No     Blood  Transfusions No     Caffeine Concern No     Comment: magdy tea in am     Occupational Exposure No     Hobby Hazards No     Sleep Concern No     Stress Concern No     Weight Concern No     Special Diet No     Back Care No     Exercise No     Bike Helmet No     Seat Belt No     Self-Exams No   Social History Narrative    How much exercise per week? Walks 5 days a weekHow much calcium per day? Eats a lot of dairy products daily. 3-4 times   How much caffeine per day? 1 cup a dayHow much vitamin D per day? DietDo you/your family wear seatbelts?  YesDo you/your family use safety helmets? YesDo you/your family use sunscreen? YesDo you/your family keep firearms in the home? NoDo you/your family have a smoke detector(s)? YesDo you feel safe in your home? YesHas anyone ever touched you in an unwanted manner? No    Renetta Shaikh CMA    10/05/2015       Family History  Family History   Problem Relation Age of Onset     Cerebrovascular Disease Paternal Grandmother      Cancer - colorectal Paternal Grandfather      Colon Cancer Paternal Grandfather      Alcohol/Drug Father      Substance Abuse Father      Alcohol/Drug Maternal Grandmother      Cerebrovascular Disease Other         paternal uncle     Anxiety Disorder Mother      Depression Mother      Obesity Mother      Hypertension Mother      Breast Cancer Mother 62        Stage 0     Cancer No family hx of         no skin cancer     Skin Cancer No family hx of      Melanoma No family hx of      No family history of uterine or ovarian cancer.    Review of Systems  CONSTITUTIONAL: NEGATIVE for fever, chills  EYES: NEGATIVE for vision changes   RESP: NEGATIVE for significant cough or SOB  CV: NEGATIVE for chest pain, palpitations   GI: NEGATIVE for nausea, abdominal pain, heartburn, or change in bowel habits  : NEGATIVE for frequency, dysuria, or hematuria  MUSCULOSKELETAL: NEGATIVE for significant arthralgias or myalgia  NEURO: NEGATIVE for weakness, dizziness or paresthesias  "or headache    OBJECTIVE   BP 93/70   Pulse 85   Ht 1.6 m (5' 3\")   Wt 64.9 kg (143 lb)   LMP 10/16/2019   BMI 25.33 kg/m    Gen: NAD, resting comfortably  HEENT: normocephalic, atraumatic; normal thyroid gland  CV: Regular rate and rhythm, normal S1 and S2 with no murmurs or extra sounds, no LE edema  RR: normal respiratory rate and effort, lungs clear to ausculation bilaterally  Abd: soft, non-tender, non-distended    Breast: normal without suspicious masses, skin changes or axillary nodes, self exam is taught and encouraged.    Pelvic:  Normal appearing external female genitalia. Normal hair distribution. Vagina is without lesions. White mucus discharge. Cervix nulliparous, no lesions, no cervical motion tenderness. Uterus is small, mobile, non-tender. No adnexal tenderness or masses.    ASSESSMENT   Stacey Maki is a 39 year old , annual preventive exam within normal limits.    PLAN     Age 40-64 Annual Preventive Exam  1.  Screening   Cervical cancer: Pap today; last pap NIL in    Normal breast and pelvic exam today   Mammography yearly: ordered   Lipids, thyroid and diabetes screening UTD and normal (2017).    2.  Immunizations   Tdap q 10 years: up to date, due    Influenza yearly     RTC in one year for annual exam or with concerns.     Staffed with Dr. Jasper Teran  MS3  19 9:16 AM    OBGYN Attending Addendum    I appreciate the note above by medical student, Liza.  I, Margie Hutchinson, was present with the medical student, who participated in the service and in the documentation of the note. I have verified the history and personally performed the physical exam and medical decision making. I have edited accordingly and agree with the assessment and plan of care as documented in the note.    Margie Hutchinson MD, MSCI    [facility]      "

## 2019-11-01 NOTE — PROGRESS NOTES
ANTICOAGULATION FOLLOW-UP CLINIC VISIT    Patient Name:  Stacey Maki  Date:  2019  Contact Type:  Telephone    SUBJECTIVE:         OBJECTIVE    INR   Date Value Ref Range Status   2019 2.52 (H) 0.86 - 1.14 Final       ASSESSMENT / PLAN  INR assessment THER    Recheck INR In: 6 WEEKS    INR Location Clinic      Anticoagulation Summary  As of 2019    INR goal:   2.0-3.0   TTR:   67.2 % (3.4 y)   INR used for dosin.52 (2019)   Warfarin maintenance plan:   6 mg (2 mg x 3) every Wed; 4 mg (2 mg x 2) all other days   Full warfarin instructions:   6 mg every Wed; 4 mg all other days   Weekly warfarin total:   30 mg   Plan last modified:   Leticia Cho RN (2019)   Next INR check:   2019   Priority:   INR   Target end date:       Indications    Long term current use of anticoagulant therapy [Z79.01]  Prothrombin gene mutation (H) [D68.52]             Anticoagulation Episode Summary     INR check location:       Preferred lab:       Send INR reminders to:   [facility]    Comments:   Patient has 2 mg Tablets Patient contact number [phone]  HIPAA INFO OK to leave messages on home or cell phone leave msg on cell [phone]  Goes to [facility] for labs/results do not come to in-basket +++SEND POOL MESSAG    OK to speak byron Edson Delroy      Anticoagulation Care Providers     Provider Role Specialty Phone number    Danny Clark MD Responsible Hematology [phone]            See the Encounter Report to view Anticoagulation Flowsheet and Dosing Calendar (Go to Encounters tab in chart review, and find the Anticoagulation Therapy Visit)    Left message for patient with results and dosing recommendations. Asked patient to call back to report any missed doses, falls, signs and symptoms of bleeding or clotting, any changes in health, medication, or diet. Asked patient to call back with any questions or concerns.     Praveena Blackwood RN

## 2019-11-01 NOTE — LETTER
2019       RE: Stacey Maki  [address] [place] [address]     Dear Colleague,    Thank you for referring your patient, Stacey Maki, to the [facility] at [facility]. Please see a copy of my visit note below.      SUBJECTIVE   Stacey Maki is a 39 year old , Patient's last menstrual period was 10/16/2019., with a history of  here for an annual preventive exam.    Specific concerns today: none    She is doing well. She started weight watchers in September and has lost 15 lbs. She is also going to classes at the . She previously had issues with depression and now feels much better and more energized.     LMP was 10/16. She gets her periods about once every 28 days. Periods are about 5 days and she has heavy flow for about 1.5 days where she has to use Ultra tampons and change every 1.5 hours. She is not currently on birth control as her  had a vasectomy. She has a history of a clotting disorder and is on warfarin.     Her mother was recently diagnosed with Stage 0 breast cancer at age 62. Pt denies breast lumps or nipple discharge.     She denies abnormal vaginal bleeding, discharge, pelvic pain, dyspareunia, or bowel or bladder changes.       Gynecologic History  Patient's last menstrual period was 10/16/2019.   Menstrual History:  Menstrual History 10/25/2018 2019 2019   LAST MENSTRUAL PERIOD - 10/16/2019 -   Menarche Age - - -   Period Cycle (Days) 24 - 28   Period Duration (Days) 5 - 5   Method of Contraception None - None   Period Pattern Regular - Regular   Menstrual Flow - - Moderate;Light   Menstrual Control - - -   Dysmenorrhea - - -   PMS Symptoms - - -   Reviewed Today Yes - Yes     Current contraception:  had vasectomy   Desires pregnancy within 12 months: N  Number of partners in last year: 1--  Denies history of STI    Lab Results   Component Value Date    PAP NIL 2014       History of abnormal Pap smear: No  HPV vaccine: no data found    Obstetric History  OB History    Para Term  AB Living   2 2 2 0 0 2   SAB TAB Ectopic Multiple Live Births   0 0 0 0 2      # Outcome Date GA Lbr Sam/2nd Weight Sex Delivery Anes PTL Lv   2 Term 11/18/10 38w0d  2.92 kg (6 lb 7 oz) M CS-LTranv Gen  DONN      Birth Comments: CAN, blood clot in brain, 5wks ,       Name: Derek Vasquez Term 06 40w5d  2.948 kg (6 lb 8 oz) M CS-LTranv   DONN      Birth Comments: dil to 10, FTD, mec.distress after AROM true knot       Name: Tim        Health Maintenance  Immunization History   Administered Date(s) Administered     Tdap (Adacel,Boostrix) 2011     Health Maintenance   Topic Date Due     DEPRESSION ACTION PLAN  1978     OP ANNUAL INR REFERRAL  10/06/2016     PHQ-9  2019     INFLUENZA VACCINE (1) 2019     HPV  2019     PAP  2019     PREVENTIVE CARE VISIT  10/25/2019     DTAP/TDAP/TD IMMUNIZATION (2 - Td) 2021     HIV SCREENING  Completed     IPV IMMUNIZATION  Aged Out     MENINGITIS IMMUNIZATION  Aged Out     Lab Results   Component Value Date    CHOL 202 10/10/2017     Lab Results   Component Value Date    HDL 79 10/10/2017     Lab Results   Component Value Date     10/10/2017     Lab Results   Component Value Date    TSH 1.73 10/10/2017     Mammogram: 2018, BI RADS 1 (negative)    Past Medical History  Past Medical History:   Diagnosis Date     Arteriovenous malformation      Asthma      Chronic cerebral venous sinus thrombosis     diagnosed in , 2nd  5wk preg.      Heart disease     mother     Hypertension 2016    mother     Idiopathic intracranial hypertension      Papilledema      Prothrombin gene mutation (H)     heterozygous type     Tension headache        Past Surgical History  Past Surgical History:   Procedure Laterality Date     cerebral angiogram      to discuss management of neural- based fistula      SECTION        ENT SURGERY  1983    adnoids removed/tubes in ears at age 3     GYN SURGERY      C section x 2     IR INTRACRANIAL EMBOLIZATION RIGHT  5/27/2011     MRI last Nov.      NO HISTORY OF SURGERY  3/20/14    derm     VASCULAR SURGERY      fistula repaired in brain       Medications  Current Outpatient Medications   Medication     acetaminophen (ACETAMIN) 500 MG tablet     escitalopram (LEXAPRO) 10 MG tablet     losartan (COZAAR) 50 MG tablet     warfarin (COUMADIN) 2 MG tablet     warfarin (COUMADIN) 2 MG tablet     No current facility-administered medications for this visit.        Allergies     Allergies   Allergen Reactions     Benzoyl Peroxide Swelling     Swollen eyelids       Social History  Social History     Socioeconomic History     Marital status:      Spouse name: Edson     Number of children: 2     Years of education: 18     Highest education level: Not on file   Occupational History     Employer: [employer]     Comment:    Social Needs     Financial resource strain: Not on file     Food insecurity:     Worry: Not on file     Inability: Not on file     Transportation needs:     Medical: Not on file     Non-medical: Not on file   Tobacco Use     Smoking status: Never Smoker     Smokeless tobacco: Never Used   Substance and Sexual Activity     Alcohol use: Yes     Comment: 2-4 drinks per month     Drug use: No     Sexual activity: Yes     Partners: Male     Birth control/protection: Male Surgical     Comment: vasectomy   Lifestyle     Physical activity:     Days per week: Not on file     Minutes per session: Not on file     Stress: Not on file   Relationships     Social connections:     Talks on phone: Not on file     Gets together: Not on file     Attends Worship service: Not on file     Active member of club or organization: Not on file     Attends meetings of clubs or organizations: Not on file     Relationship status: Not on file     Intimate partner violence:      Fear of current or ex partner: Not on file     Emotionally abused: Not on file     Physically abused: Not on file     Forced sexual activity: Not on file   Other Topics Concern     Parent/sibling w/ CABG, MI or angioplasty before 65F 55M? Not Asked      Service No     Blood Transfusions No     Caffeine Concern No     Comment: magdy tea in am     Occupational Exposure No     Hobby Hazards No     Sleep Concern No     Stress Concern No     Weight Concern No     Special Diet No     Back Care No     Exercise No     Bike Helmet No     Seat Belt No     Self-Exams No   Social History Narrative    How much exercise per week? Walks 5 days a weekHow much calcium per day? Eats a lot of dairy products daily. 3-4 times   How much caffeine per day? 1 cup a dayHow much vitamin D per day? DietDo you/your family wear seatbelts?  YesDo you/your family use safety helmets? YesDo you/your family use sunscreen? YesDo you/your family keep firearms in the home? NoDo you/your family have a smoke detector(s)? YesDo you feel safe in your home? YesHas anyone ever touched you in an unwanted manner? No    Renetta Shaikh, [facility]    10/05/2015       Family History  Family History   Problem Relation Age of Onset     Cerebrovascular Disease Paternal Grandmother      Cancer - colorectal Paternal Grandfather      Colon Cancer Paternal Grandfather      Alcohol/Drug Father      Substance Abuse Father      Alcohol/Drug Maternal Grandmother      Cerebrovascular Disease Other         paternal uncle     Anxiety Disorder Mother      Depression Mother      Obesity Mother      Hypertension Mother      Breast Cancer Mother 62        Stage 0     Cancer No family hx of         no skin cancer     Skin Cancer No family hx of      Melanoma No family hx of      No family history of uterine or ovarian cancer.    Review of Systems  CONSTITUTIONAL: NEGATIVE for fever, chills  EYES: NEGATIVE for vision changes   RESP: NEGATIVE for significant cough or SOB  CV: NEGATIVE  "for chest pain, palpitations   GI: NEGATIVE for nausea, abdominal pain, heartburn, or change in bowel habits  : NEGATIVE for frequency, dysuria, or hematuria  MUSCULOSKELETAL: NEGATIVE for significant arthralgias or myalgia  NEURO: NEGATIVE for weakness, dizziness or paresthesias or headache    OBJECTIVE   BP 93/70   Pulse 85   Ht 1.6 m (5' 3\")   Wt 64.9 kg (143 lb)   LMP 10/16/2019   BMI 25.33 kg/m     Gen: NAD, resting comfortably  HEENT: normocephalic, atraumatic; normal thyroid gland  CV: Regular rate and rhythm, normal S1 and S2 with no murmurs or extra sounds, no LE edema  RR: normal respiratory rate and effort, lungs clear to ausculation bilaterally  Abd: soft, non-tender, non-distended    Breast: normal without suspicious masses, skin changes or axillary nodes, self exam is taught and encouraged.    Pelvic:  Normal appearing external female genitalia. Normal hair distribution. Vagina is without lesions. White mucus discharge. Cervix nulliparous, no lesions, no cervical motion tenderness. Uterus is small, mobile, non-tender. No adnexal tenderness or masses.    ASSESSMENT   Stacey Maki is a 39 year old , annual preventive exam within normal limits.    PLAN     Age 40-64 Annual Preventive Exam  1.  Screening   Cervical cancer: Pap today; last pap NIL in    Normal breast and pelvic exam today   Mammography yearly: ordered   Lipids, thyroid and diabetes screening UTD and normal (2017).    2.  Immunizations   Tdap q 10 years: up to date, due    Influenza yearly     RTC in one year for annual exam or with concerns.     Staffed with Dr. Jasper Teran  MS3  19 9:16 AM    OBGYN Attending Addendum    I appreciate the note above by medical student, Liza.  I, Margie Hutchinson, was present with the medical student, who participated in the service and in the documentation of the note. I have verified the history and personally performed the physical exam and medical decision " making. I have edited accordingly and agree with the assessment and plan of care as documented in the note.    Margie Hutchinson MD, MSCI    [facility]

## 2019-11-02 ASSESSMENT — ANXIETY QUESTIONNAIRES: GAD7 TOTAL SCORE: 4

## 2019-11-05 ENCOUNTER — HEALTH MAINTENANCE LETTER (OUTPATIENT)
Age: 39
End: 2019-11-05

## 2019-11-05 LAB
COPATH REPORT: NORMAL
PAP: NORMAL

## 2019-11-07 LAB
FINAL DIAGNOSIS: NORMAL
HPV HR 12 DNA CVX QL NAA+PROBE: NEGATIVE
HPV16 DNA SPEC QL NAA+PROBE: NEGATIVE
HPV18 DNA SPEC QL NAA+PROBE: NEGATIVE
SPECIMEN DESCRIPTION: NORMAL
SPECIMEN SOURCE CVX/VAG CYTO: NORMAL

## 2019-12-02 ENCOUNTER — ANTICOAGULATION THERAPY VISIT (OUTPATIENT)
Dept: ANTICOAGULATION | Facility: CLINIC | Age: 39
End: 2019-12-02

## 2019-12-02 ENCOUNTER — ANCILLARY PROCEDURE (OUTPATIENT)
Dept: MAMMOGRAPHY | Facility: CLINIC | Age: 39
End: 2019-12-02
Attending: OBSTETRICS & GYNECOLOGY
Payer: COMMERCIAL

## 2019-12-02 DIAGNOSIS — D68.52 PROTHROMBIN GENE MUTATION (H): ICD-10-CM

## 2019-12-02 DIAGNOSIS — Z00.00 PREVENTATIVE HEALTH CARE: ICD-10-CM

## 2019-12-02 DIAGNOSIS — Z79.01 LONG TERM CURRENT USE OF ANTICOAGULANT THERAPY: ICD-10-CM

## 2019-12-02 LAB — INR PPP: 1.45 (ref 0.86–1.14)

## 2019-12-02 PROCEDURE — 36415 COLL VENOUS BLD VENIPUNCTURE: CPT | Performed by: INTERNAL MEDICINE

## 2019-12-02 PROCEDURE — 77067 SCR MAMMO BI INCL CAD: CPT

## 2019-12-02 PROCEDURE — 85610 PROTHROMBIN TIME: CPT | Performed by: INTERNAL MEDICINE

## 2019-12-02 NOTE — PROGRESS NOTES
ANTICOAGULATION FOLLOW-UP CLINIC VISIT    Patient Name:  Stacey Maki  Date:  2019  Contact Type:  Telephone    SUBJECTIVE:         OBJECTIVE    INR   Date Value Ref Range Status   2019 1.45 (H) 0.86 - 1.14 Final       ASSESSMENT / PLAN  INR assessment SUB    Recheck INR In: 4 DAYS    INR Location Clinic      Anticoagulation Summary  As of 2019    INR goal:   2.0-3.0   TTR:   72.5 % (1 y)   INR used for dosin.45! (2019)   Warfarin maintenance plan:   6 mg (2 mg x 3) every Wed; 4 mg (2 mg x 2) all other days   Full warfarin instructions:   : 6 mg; Otherwise 6 mg every Wed; 4 mg all other days   Weekly warfarin total:   30 mg   Plan last modified:   Leticia Cho RN (2019)   Next INR check:   2019   Priority:   INR   Target end date:       Indications    Long term current use of anticoagulant therapy [Z79.01]  Prothrombin gene mutation (H) [D68.52]             Anticoagulation Episode Summary     INR check location:       Preferred lab:       Send INR reminders to:    [facility]    Comments:   Patient has 2 mg Tablets Patient contact number [phone]  HIPAA INFO OK to leave messages on home or cell phone leave msg on cell [phone]  Goes to [facility] for labs/results do not come to in-basket +++SEND POOL MESSAG    OK to speak byron Ptots      Anticoagulation Care Providers     Provider Role Specialty Phone number    Danny Clark MD Responsible Hematology [phone]            See the Encounter Report to view Anticoagulation Flowsheet and Dosing Calendar (Go to Encounters tab in chart review, and find the Anticoagulation Therapy Visit)  Left message for patient with results and dosing recommendations. Asked patient to call back to report any missed doses, falls, signs and symptoms of bleeding or clotting, any changes in health, medication, or diet. Asked patient to call back with any questions or concerns.      Karina Khoury RN

## 2019-12-12 ENCOUNTER — ANCILLARY PROCEDURE (OUTPATIENT)
Dept: MAMMOGRAPHY | Facility: CLINIC | Age: 39
End: 2019-12-12
Attending: OBSTETRICS & GYNECOLOGY
Payer: COMMERCIAL

## 2019-12-12 DIAGNOSIS — R92.8 ABNORMAL MAMMOGRAM OF LEFT BREAST: ICD-10-CM

## 2019-12-17 ENCOUNTER — ANTICOAGULATION THERAPY VISIT (OUTPATIENT)
Dept: ANTICOAGULATION | Facility: CLINIC | Age: 39
End: 2019-12-17

## 2019-12-17 ENCOUNTER — OFFICE VISIT (OUTPATIENT)
Dept: INTERNAL MEDICINE | Facility: CLINIC | Age: 39
End: 2019-12-17
Attending: INTERNAL MEDICINE
Payer: COMMERCIAL

## 2019-12-17 VITALS
HEART RATE: 74 BPM | BODY MASS INDEX: 24.09 KG/M2 | DIASTOLIC BLOOD PRESSURE: 78 MMHG | WEIGHT: 136 LBS | SYSTOLIC BLOOD PRESSURE: 116 MMHG

## 2019-12-17 DIAGNOSIS — Z79.01 LONG TERM CURRENT USE OF ANTICOAGULANT THERAPY: ICD-10-CM

## 2019-12-17 DIAGNOSIS — D68.52 PROTHROMBIN GENE MUTATION (H): ICD-10-CM

## 2019-12-17 DIAGNOSIS — I10 BENIGN ESSENTIAL HYPERTENSION: ICD-10-CM

## 2019-12-17 LAB
ANION GAP SERPL CALCULATED.3IONS-SCNC: 4 MMOL/L (ref 3–14)
BUN SERPL-MCNC: 10 MG/DL (ref 7–30)
CALCIUM SERPL-MCNC: 9.2 MG/DL (ref 8.5–10.1)
CHLORIDE SERPL-SCNC: 107 MMOL/L (ref 94–109)
CO2 SERPL-SCNC: 28 MMOL/L (ref 20–32)
CREAT SERPL-MCNC: 0.64 MG/DL (ref 0.52–1.04)
GFR SERPL CREATININE-BSD FRML MDRD: >90 ML/MIN/{1.73_M2}
GLUCOSE SERPL-MCNC: 84 MG/DL (ref 70–99)
INR PPP: 1.77 (ref 0.86–1.14)
POTASSIUM SERPL-SCNC: 4.6 MMOL/L (ref 3.4–5.3)
SODIUM SERPL-SCNC: 139 MMOL/L (ref 133–144)

## 2019-12-17 PROCEDURE — 80048 BASIC METABOLIC PNL TOTAL CA: CPT | Performed by: INTERNAL MEDICINE

## 2019-12-17 PROCEDURE — 85610 PROTHROMBIN TIME: CPT | Performed by: INTERNAL MEDICINE

## 2019-12-17 PROCEDURE — 36415 COLL VENOUS BLD VENIPUNCTURE: CPT | Performed by: INTERNAL MEDICINE

## 2019-12-17 PROCEDURE — G0463 HOSPITAL OUTPT CLINIC VISIT: HCPCS | Mod: ZF

## 2019-12-17 RX ORDER — LOSARTAN POTASSIUM 25 MG/1
25 TABLET ORAL DAILY
Qty: 30 TABLET | Refills: 3 | Status: SHIPPED | OUTPATIENT
Start: 2019-12-17 | End: 2020-06-22

## 2019-12-17 NOTE — PROGRESS NOTES
HPI  Patient is here for follow-up on hypertension.  She reports that she is working on lifestyle changes.  She states that occasionally she does feel dizzy and lightheaded, however those symptoms go away quickly.  She is wondering if her medication dose could potentially be reduced.    Review of Systems     Constitutional:  Negative for fever, chills and fatigue.   Respiratory:   Negative for cough and dyspnea on exertion.    Cardiovascular:  Negative for chest pain, dyspnea on exertion and edema.   Gastrointestinal:  Negative for nausea, abdominal pain, diarrhea and constipation.   Skin:  Negative for itching and rash.   Endo/Heme:  Negative for anemia, swollen glands and bruises/bleeds easily.   Psychiatric/Behavioral:  Negative for depression, decreased concentration, mood swings and panic attacks.    Endocrine:  Negative for altered temperature regulation, polyphagia, polydipsia, unwanted hair growth and change in facial hair.    Current Outpatient Medications   Medication     acetaminophen (ACETAMIN) 500 MG tablet     escitalopram (LEXAPRO) 10 MG tablet     losartan (COZAAR) 50 MG tablet     warfarin (COUMADIN) 2 MG tablet     warfarin (COUMADIN) 2 MG tablet     No current facility-administered medications for this visit.      Vitals:    12/17/19 1422 12/17/19 1430 12/17/19 1431 12/17/19 1432   BP: 115/78 115/78 117/78 116/78   Pulse: 74 74 74 74   Weight: 61.7 kg (136 lb)          Physical Exam  Vitals signs and nursing note reviewed.   Constitutional:       Appearance: Normal appearance.   HENT:      Head: Normocephalic.      Mouth/Throat:      Mouth: Mucous membranes are moist.      Pharynx: Oropharynx is clear.   Cardiovascular:      Rate and Rhythm: Normal rate and regular rhythm.   Pulmonary:      Effort: Pulmonary effort is normal.   Musculoskeletal:         General: No edema.   Neurological:      Mental Status: She is alert.   Psychiatric:         Mood and Affect: Mood normal.         Behavior:  Behavior normal.         Thought Content: Thought content normal.         Judgment: Judgment normal.         Assessment and Plan:  Stacey was seen today for follow up.    Diagnoses and all orders for this visit:    Benign essential hypertension.  Blood pressure is currently within acceptable range.  Given patient's symptoms, recommend reducing the dose of losartan to 25 mg.  We will follow-up in 1 month to review blood pressure and consider possible discontinuation of the medication if it remains within acceptable range.  -     losartan (COZAAR) 25 MG tablet; Take 1 tablet (25 mg) by mouth daily  -     Basic Metabolic Panel    Prothrombin gene mutation (H).  Patient will continue with anticoagulation per hematology recommendations.  An order for INR was placed today.  -     INR      Total time spent 25 minutes.  More than 50% of the time spent with Ms. Maki on counseling / coordinating her care    Simona Lange MD

## 2019-12-17 NOTE — PROGRESS NOTES
ANTICOAGULATION FOLLOW-UP CLINIC VISIT    Patient Name:  Stacey Maki  Date:  2019  Contact Type:  Telephone    SUBJECTIVE:         OBJECTIVE    INR   Date Value Ref Range Status   2019 1.77 (H) 0.86 - 1.14 Final       ASSESSMENT / PLAN  INR assessment SUB    Recheck INR In: 5 DAYS    INR Location Clinic      Anticoagulation Summary  As of 2019    INR goal:   2.0-3.0   TTR:   68.3 % (1 y)   INR used for dosin.77! (2019)   Warfarin maintenance plan:   6 mg (2 mg x 3) every Wed; 4 mg (2 mg x 2) all other days   Full warfarin instructions:   : 6 mg; Otherwise 6 mg every Wed; 4 mg all other days   Weekly warfarin total:   30 mg   Plan last modified:   Leticia Cho RN (2019)   Next INR check:   2019   Priority:   Maintenance   Target end date:       Indications    Long term current use of anticoagulant therapy [Z79.01]  Prothrombin gene mutation (H) [D68.52]             Anticoagulation Episode Summary     INR check location:       Preferred lab:       Send INR reminders to:    [facility]    Comments:   Patient has 2 mg Tablets Patient contact number [phone]  HIPAA INFO OK to leave messages on home or cell phone leave msg on cell [phone]  Goes to [facility] for labs/results do not come to in-basket +++SEND POOL MESSAG    OK to speak byron Potts      Anticoagulation Care Providers     Provider Role Specialty Phone number    Danny Clark MD Responsible Hematology [phone]            See the Encounter Report to view Anticoagulation Flowsheet and Dosing Calendar (Go to Encounters tab in chart review, and find the Anticoagulation Therapy Visit)    Left message for patient with results and dosing recommendations. Asked patient to call back to report any missed doses, falls, signs and symptoms of bleeding or clotting, any changes in health, medication, or diet. Asked patient to call back with any questions or concerns.    On message, asked  Stacey to call back with any missed doses of warfarin, or any changes in medications.  Also asked that she call back if she is not able to recheck INR 12/23/19.  Merced Cadet RN        12/18/19:  Received a message from Stacey.  She missed a dose of warfarin 12/13/19 and she is also increasing greens because she is on Weight Watchers.  She is eating greens daily now, before it was a couple of days a week.  Spoke with Stacey.  Recommend she take warfarin 6 mg today, then go to 6 mg TuF and 4 mg all other days of the week.   She will recheck INR 12/30/19.  MAS

## 2019-12-17 NOTE — LETTER
12/17/2019       RE: Stacey Maki  [address] [place] [address]     Dear Colleague,    Thank you for referring your patient, Stacey Maki, to the [facility]  at [facility]. Please see a copy of my visit note below.    HPI  Patient is here for follow-up on hypertension.  She reports that she is working on lifestyle changes.  She states that occasionally she does feel dizzy and lightheaded, however those symptoms go away quickly.  She is wondering if her medication dose could potentially be reduced.    Review of Systems     Constitutional:  Negative for fever, chills and fatigue.   Respiratory:   Negative for cough and dyspnea on exertion.    Cardiovascular:  Negative for chest pain, dyspnea on exertion and edema.   Gastrointestinal:  Negative for nausea, abdominal pain, diarrhea and constipation.   Skin:  Negative for itching and rash.   Endo/Heme:  Negative for anemia, swollen glands and bruises/bleeds easily.   Psychiatric/Behavioral:  Negative for depression, decreased concentration, mood swings and panic attacks.    Endocrine:  Negative for altered temperature regulation, polyphagia, polydipsia, unwanted hair growth and change in facial hair.    Current Outpatient Medications   Medication     acetaminophen (ACETAMIN) 500 MG tablet     escitalopram (LEXAPRO) 10 MG tablet     losartan (COZAAR) 50 MG tablet     warfarin (COUMADIN) 2 MG tablet     warfarin (COUMADIN) 2 MG tablet     No current facility-administered medications for this visit.      Vitals:    12/17/19 1422 12/17/19 1430 12/17/19 1431 12/17/19 1432   BP: 115/78 115/78 117/78 116/78   Pulse: 74 74 74 74   Weight: 61.7 kg (136 lb)          Physical Exam  Vitals signs and nursing note reviewed.   Constitutional:       Appearance: Normal appearance.   HENT:      Head: Normocephalic.      Mouth/Throat:      Mouth: Mucous membranes are moist.      Pharynx: Oropharynx is clear.    Cardiovascular:      Rate and Rhythm: Normal rate and regular rhythm.   Pulmonary:      Effort: Pulmonary effort is normal.   Musculoskeletal:         General: No edema.   Neurological:      Mental Status: She is alert.   Psychiatric:         Mood and Affect: Mood normal.         Behavior: Behavior normal.         Thought Content: Thought content normal.         Judgment: Judgment normal.         Assessment and Plan:  Stacey was seen today for follow up.    Diagnoses and all orders for this visit:    Benign essential hypertension.  Blood pressure is currently within acceptable range.  Given patient's symptoms, recommend reducing the dose of losartan to 25 mg.  We will follow-up in 1 month to review blood pressure and consider possible discontinuation of the medication if it remains within acceptable range.  -     losartan (COZAAR) 25 MG tablet; Take 1 tablet (25 mg) by mouth daily  -     Basic Metabolic Panel    Prothrombin gene mutation (H).  Patient will continue with anticoagulation per hematology recommendations.  An order for INR was placed today.  -     INR      Total time spent 25 minutes.  More than 50% of the time spent with Ms. Maki on counseling / coordinating her care    Simona Lange MD

## 2019-12-25 ASSESSMENT — ENCOUNTER SYMPTOMS
BRUISES/BLEEDS EASILY: 0
CHILLS: 0
POLYPHAGIA: 0
FEVER: 0
POLYDIPSIA: 0
DEPRESSION: 0
ALTERED TEMPERATURE REGULATION: 0
CONSTIPATION: 0
FATIGUE: 0
DECREASED CONCENTRATION: 0
NAUSEA: 0
ABDOMINAL PAIN: 0
COUGH: 0
NERVOUS/ANXIOUS: 0
PANIC: 0
SWOLLEN GLANDS: 0
DYSPNEA ON EXERTION: 0
INSOMNIA: 0
DIARRHEA: 0

## 2019-12-30 DIAGNOSIS — F41.1 GAD (GENERALIZED ANXIETY DISORDER): ICD-10-CM

## 2019-12-30 DIAGNOSIS — F32.A DEPRESSION, UNSPECIFIED DEPRESSION TYPE: ICD-10-CM

## 2019-12-30 NOTE — TELEPHONE ENCOUNTER
Pt recently established with Dr. Lange. Requests refill escitalopram. ROuting to Dr. Lange as this was not discussed at 12/17/19 office visit

## 2020-01-03 ENCOUNTER — MYC REFILL (OUTPATIENT)
Dept: INTERNAL MEDICINE | Facility: CLINIC | Age: 40
End: 2020-01-03

## 2020-01-03 DIAGNOSIS — F41.1 GAD (GENERALIZED ANXIETY DISORDER): ICD-10-CM

## 2020-01-03 DIAGNOSIS — F32.A DEPRESSION, UNSPECIFIED DEPRESSION TYPE: ICD-10-CM

## 2020-01-03 RX ORDER — ESCITALOPRAM OXALATE 10 MG/1
10 TABLET ORAL DAILY
Qty: 90 TABLET | Refills: 0 | Status: CANCELLED | OUTPATIENT
Start: 2020-01-03

## 2020-01-08 RX ORDER — ESCITALOPRAM OXALATE 10 MG/1
10 TABLET ORAL DAILY
Qty: 90 TABLET | Refills: 1 | Status: SHIPPED | OUTPATIENT
Start: 2020-01-08 | End: 2020-05-12

## 2020-01-24 ENCOUNTER — HOSPITAL ENCOUNTER (OUTPATIENT)
Dept: LAB | Facility: CLINIC | Age: 40
Discharge: HOME | End: 2020-01-24
Attending: INTERNAL MEDICINE | Admitting: INTERNAL MEDICINE
Payer: COMMERCIAL

## 2020-01-24 DIAGNOSIS — D68.52 PROTHROMBIN GENE MUTATION (H): ICD-10-CM

## 2020-01-24 LAB — INR PPP: 2.59 (ref 0.86–1.14)

## 2020-01-24 PROCEDURE — 85610 PROTHROMBIN TIME: CPT | Performed by: INTERNAL MEDICINE

## 2020-01-24 PROCEDURE — 36415 COLL VENOUS BLD VENIPUNCTURE: CPT | Performed by: INTERNAL MEDICINE

## 2020-01-27 ENCOUNTER — ANTICOAGULATION THERAPY VISIT (OUTPATIENT)
Dept: ANTICOAGULATION | Facility: CLINIC | Age: 40
End: 2020-01-27

## 2020-01-27 ENCOUNTER — MYC MEDICAL ADVICE (OUTPATIENT)
Dept: OBGYN | Facility: CLINIC | Age: 40
End: 2020-01-27

## 2020-01-27 DIAGNOSIS — B37.31 YEAST INFECTION OF THE VAGINA: Primary | ICD-10-CM

## 2020-01-27 DIAGNOSIS — D68.52 PROTHROMBIN GENE MUTATION (H): ICD-10-CM

## 2020-01-27 DIAGNOSIS — Z79.01 LONG TERM CURRENT USE OF ANTICOAGULANT THERAPY: ICD-10-CM

## 2020-01-27 NOTE — PROGRESS NOTES
3/19/20 Stacey calling.  She prefers to avoid the lab for 4 weeks.  Received a letter from [facility].  INR results are stable, so granted update for next INR due to viral threat. Sent INR renewal orders to Dr. Clark for electronic signature.  Sent 2mg tablets of Warfarin to [facility] with refill. YuryRN         ANTICOAGULATION FOLLOW-UP CLINIC VISIT    Patient Name:  Stacey Maki  Date:  2020  Contact Type:  Telephone    SUBJECTIVE:  Patient Findings     Comments:   Message is left for patient to call back and clarify warfarin dosing.         Clinical Outcomes     Comments:   Message is left for patient to call back and clarify warfarin dosing.            OBJECTIVE    INR   Date Value Ref Range Status   2020 2.59 (H) 0.86 - 1.14 Final       ASSESSMENT / PLAN  No question data found.  Anticoagulation Summary  As of 2020    INR goal:   2.0-3.0   TTR:   69.5 % (1 y)   INR used for dosin.59 (2020)   Warfarin maintenance plan:   6 mg (2 mg x 3) every Tue, Fri; 4 mg (2 mg x 2) all other days   Full warfarin instructions:   6 mg every Tue, Fri; 4 mg all other days   Weekly warfarin total:   32 mg   No change documented:   Leticia Cho RN   Plan last modified:   Merced Cadet RN (2019)   Next INR check:   3/6/2020   Priority:   High   Target end date:       Indications    Long term current use of anticoagulant therapy [Z79.01]  Prothrombin gene mutation (H) [D68.52]             Anticoagulation Episode Summary     INR check location:       Preferred lab:       Send INR reminders to:   [facility]    Comments:   Patient has 2 mg Tablets Patient contact number [phone]  HIPAA INFO OK to leave messages on home or cell phone leave msg on cell [phone]  Goes to [facility] for labs/results do not come [facility] in[facility]basket +[facility]    OK to speak byron Potts      Anticoagulation Care Providers     Provider Role Specialty Phone number    Danny Clark MD  [facility] Hematology [phone]            See the Encounter Report to view Anticoagulation Flowsheet and Dosing Calendar (Go to Encounters tab in chart review, and find the Anticoagulation Therapy Visit)    Left message for patient with results and dosing recommendations. Asked patient to call back to report any missed doses, falls, signs and symptoms of bleeding or clotting, any changes in health, medication, or diet. Asked patient to call back with any questions or concerns.     Leticia Cho RN

## 2020-01-29 RX ORDER — FLUCONAZOLE 150 MG/1
150 TABLET ORAL ONCE
Qty: 1 TABLET | Refills: 1 | Status: SHIPPED | OUTPATIENT
Start: 2020-01-29 | End: 2020-07-24

## 2020-03-19 RX ORDER — WARFARIN SODIUM 2 MG/1
TABLET ORAL
Qty: 65 TABLET | Refills: 1 | Status: SHIPPED | OUTPATIENT
Start: 2020-03-19 | End: 2020-06-02

## 2020-04-07 ENCOUNTER — TELEPHONE (OUTPATIENT)
Dept: ANTICOAGULATION | Facility: CLINIC | Age: 40
End: 2020-04-07

## 2020-04-07 ENCOUNTER — HOSPITAL ENCOUNTER (OUTPATIENT)
Dept: LAB | Facility: CLINIC | Age: 40
Discharge: HOME | End: 2020-04-07
Attending: INTERNAL MEDICINE | Admitting: INTERNAL MEDICINE
Payer: COMMERCIAL

## 2020-04-07 ENCOUNTER — ANTICOAGULATION THERAPY VISIT (OUTPATIENT)
Dept: ANTICOAGULATION | Facility: CLINIC | Age: 40
End: 2020-04-07

## 2020-04-07 DIAGNOSIS — Z79.01 LONG TERM CURRENT USE OF ANTICOAGULANT THERAPY: ICD-10-CM

## 2020-04-07 DIAGNOSIS — D68.52 PROTHROMBIN GENE MUTATION (H): ICD-10-CM

## 2020-04-07 LAB — INR PPP: 2.94 (ref 0.86–1.14)

## 2020-04-07 PROCEDURE — 85610 PROTHROMBIN TIME: CPT | Performed by: INTERNAL MEDICINE

## 2020-04-07 NOTE — TELEPHONE ENCOUNTER
Stacey is on second letter list.  Left her a message reminding her she is due for an INR.  Second letter will be sent 4/21/2020 if no INR.  Merced Cadet RN

## 2020-04-07 NOTE — PROGRESS NOTES
ANTICOAGULATION FOLLOW-UP CLINIC VISIT    Patient Name:  Stacey Maki  Date:  2020  Contact Type:  Telephone    SUBJECTIVE:         OBJECTIVE    INR   Date Value Ref Range Status   2020 2.94 (H) 0.86 - 1.14 Final       ASSESSMENT / PLAN  INR assessment THER    Recheck INR In: 12 WEEKS    INR Location Clinic      Anticoagulation Summary  As of 2020    INR goal:   2.0-3.0   TTR:   76.8 % (1 y)   INR used for dosin.94 (2020)   Warfarin maintenance plan:   6 mg (2 mg x 3) every Tue; 4 mg (2 mg x 2) all other days   Full warfarin instructions:   : 4 mg; Otherwise 6 mg every Tue; 4 mg all other days   Weekly warfarin total:   30 mg   Plan last modified:   Karina Khoury RN (2020)   Next INR check:   2020   Priority:   High   Target end date:   Indefinite    Indications    Long term current use of anticoagulant therapy [Z79.01]  Prothrombin gene mutation (H) [D68.52]             Anticoagulation Episode Summary     INR check location:       Preferred lab:       Send INR reminders to:    [facility]    Comments:   Patient has 2 mg Tablets Patient contact number [phone]  HIPAA INFO OK to leave messages on home or cell phone leave msg on cell [phone]  Goes to [facility] for labs/results do not come to in-basket +++SEND POOL MESSAG    OK to speak byron Potts      Anticoagulation Care Providers     Provider Role Specialty Phone number    Danny Clark MD Referring Hematology [phone]            See the Encounter Report to view Anticoagulation Flowsheet and Dosing Calendar (Go to Encounters tab in chart review, and find the Anticoagulation Therapy Visit)  Spoke with patient.    Karina Khoury RN

## 2020-05-11 ENCOUNTER — MYC REFILL (OUTPATIENT)
Dept: OBGYN | Facility: CLINIC | Age: 40
End: 2020-05-11

## 2020-05-11 DIAGNOSIS — F32.A DEPRESSION, UNSPECIFIED DEPRESSION TYPE: ICD-10-CM

## 2020-05-11 DIAGNOSIS — F41.1 GAD (GENERALIZED ANXIETY DISORDER): ICD-10-CM

## 2020-05-11 RX ORDER — ESCITALOPRAM OXALATE 10 MG/1
10 TABLET ORAL DAILY
Qty: 90 TABLET | Refills: 1 | Status: CANCELLED | OUTPATIENT
Start: 2020-05-11

## 2020-05-12 ENCOUNTER — VIRTUAL VISIT (OUTPATIENT)
Dept: INTERNAL MEDICINE | Facility: CLINIC | Age: 40
End: 2020-05-12
Attending: INTERNAL MEDICINE
Payer: COMMERCIAL

## 2020-05-12 DIAGNOSIS — F32.A DEPRESSION, UNSPECIFIED DEPRESSION TYPE: ICD-10-CM

## 2020-05-12 DIAGNOSIS — F41.1 GAD (GENERALIZED ANXIETY DISORDER): ICD-10-CM

## 2020-05-12 RX ORDER — ESCITALOPRAM OXALATE 20 MG/1
20 TABLET ORAL DAILY
Qty: 90 TABLET | Refills: 3 | Status: SHIPPED | OUTPATIENT
Start: 2020-05-12 | End: 2021-04-15

## 2020-05-12 NOTE — PROGRESS NOTES
"Stacey Maki is a 40 year old female who is being evaluated via a billable telephone visit.      The patient has been notified of following:     \"This telephone visit will be conducted via a call between you and your physician/provider. We have found that certain health care needs can be provided without the need for a physical exam.  This service lets us provide the care you need with a short phone conversation.  If a prescription is necessary we can send it directly to your pharmacy.  If lab work is needed we can place an order for that and you can then stop by our lab to have the test done at a later time.    Telephone visits are billed at different rates depending on your insurance coverage. During this emergency period, for some insurers they may be billed the same as an in-person visit.  Please reach out to your insurance provider with any questions.    If during the course of the call the physician/provider feels a telephone visit is not appropriate, you will not be charged for this service.\"    Patient has given verbal consent for Telephone visit?  Yes    What phone number would you like to be contacted at? [phone]     How would you like to obtain your AVS? [place]    Subjective     Stacey Maki is a 40 year old female who presents to clinic today for the following health issues:    HPI    Patient is following up on depression. She reports that she needs a refill for Lexapro. She reports that it has been working well for her. She has been on a lower dose previously. The medication was initially prescribed for PMS, however, she has started taking it daily for depression. She is wondering if she can increase the dose     -------------------------------------    Patient Active Problem List   Diagnosis     Benign neoplasm of skin     Dermatofibroma     Prothrombin gene mutation (H)     Long term current use of anticoagulant therapy     Embolism and thrombosis (H)     Past Surgical History: "   Procedure Laterality Date     cerebral angiogram      to discuss management of neural- based fistula      SECTION       ENT SURGERY      adnoids removed/tubes in ears at age 3     GYN SURGERY      C section x 2     IR INTRACRANIAL EMBOLIZATION RIGHT  2011     MRI last Nov.      NO HISTORY OF SURGERY  3/20/14    derm     VASCULAR SURGERY      fistula repaired in brain       Social History     Tobacco Use     Smoking status: Never Smoker     Smokeless tobacco: Never Used   Substance Use Topics     Alcohol use: Yes     Comment: 2-4 drinks per month     Family History   Problem Relation Age of Onset     Cerebrovascular Disease Paternal Grandmother      Cancer - colorectal Paternal Grandfather      Colon Cancer Paternal Grandfather      Alcohol/Drug Father      Substance Abuse Father      Alcohol/Drug Maternal Grandmother      Cerebrovascular Disease Other         paternal uncle     Anxiety Disorder Mother      Depression Mother      Obesity Mother      Hypertension Mother      Breast Cancer Mother 62        Stage 0     Cancer No family hx of         no skin cancer     Skin Cancer No family hx of      Melanoma No family hx of          Current Outpatient Medications   Medication Sig Dispense Refill     escitalopram (LEXAPRO) 20 MG tablet Take 1 tablet (20 mg) by mouth daily 90 tablet 3     acetaminophen (ACETAMIN) 500 MG tablet Take 2 tablets by mouth every 6 hours as needed.       losartan (COZAAR) 25 MG tablet Take 1 tablet (25 mg) by mouth daily 30 tablet 3     warfarin (COUMADIN) 2 MG tablet Take 6mgs on  and 4mgs on all other days or  as directed by anticoagulation clinic. 65 tablet 5     warfarin ANTICOAGULANT (COUMADIN) 2 MG tablet Take 2 to 3 tablets by mouth daily or as directed by the Coumadin clinic. 65 tablet 1     warfarin ANTICOAGULANT (COUMADIN) 2 MG tablet Take 2-3 tablets daily or as directed by coumadin clinic. 65 tablet 2       Reviewed and updated as needed this visit by  Provider         Review of Systems   CONSTITUTIONAL: NEGATIVE for fever, chills, change in weight  INTEGUMENTARY/SKIN: NEGATIVE for worrisome rashes, moles or lesions  EYES: NEGATIVE for vision changes or irritation  ENT/MOUTH: NEGATIVE for ear, mouth and throat problems  RESP: NEGATIVE for significant cough or SOB  BREAST: NEGATIVE for masses, tenderness or discharge  CV: NEGATIVE for chest pain, palpitations or peripheral edema  GI: NEGATIVE for nausea, abdominal pain, heartburn, or change in bowel habits  : NEGATIVE for frequency, dysuria, or hematuria  MUSCULOSKELETAL: NEGATIVE for significant arthralgias or myalgia  NEURO: NEGATIVE for weakness, dizziness or paresthesias  ENDOCRINE: NEGATIVE for temperature intolerance, skin/hair changes  HEME: NEGATIVE for bleeding problems  PSYCHIATRIC: anxiety and depressed mood       Objective   Reported vitals:  There were no vitals taken for this visit.   healthy, alert and no distress  PSYCH: Alert and oriented times 3; coherent speech, normal   rate and volume, able to articulate logical thoughts, able   to abstract reason, no tangential thoughts, no hallucinations   or delusions  Her affect is normal  RESP: No cough, no audible wheezing, able to talk in full sentences  Remainder of exam unable to be completed due to telephone visits    Diagnostic Test Results:  Labs reviewed in Epic        Assessment/Plan:  1. NIKA (generalized anxiety disorder)  Reviewed management of anxiety with patient. Discussed medication dosing and effect, recommend to continue with current dose of LexaPro. Patient is in agreement with the plan.   - escitalopram (LEXAPRO) 20 MG tablet; Take 1 tablet (20 mg) by mouth daily  Dispense: 90 tablet; Refill: 3  - MENTAL HEALTH REFERRAL  - Adult; Outpatient Treatment; Individual/Couples/Family/Group Therapy/Health Psychology; [facility]: [facility] [phone]; We will contact you to schedule the appointment or please call with any  questions    2. Depression, unspecified depression type  Referral to counseling was made today.   - escitalopram (LEXAPRO) 20 MG tablet; Take 1 tablet (20 mg) by mouth daily  Dispense: 90 tablet; Refill: 3    Return in about 1 month (around 6/12/2020).      Phone call duration:  15 minutes    Simona Lange MD

## 2020-06-02 ENCOUNTER — TELEPHONE (OUTPATIENT)
Dept: HEMATOLOGY | Facility: CLINIC | Age: 40
End: 2020-06-02

## 2020-06-02 NOTE — TELEPHONE ENCOUNTER
Left VM for patient to call back to be scheduled needs an annual appointment with MD or PA for July or August.

## 2020-06-22 DIAGNOSIS — I10 BENIGN ESSENTIAL HYPERTENSION: ICD-10-CM

## 2020-06-23 RX ORDER — LOSARTAN POTASSIUM 25 MG/1
25 TABLET ORAL DAILY
Qty: 30 TABLET | Refills: 0 | Status: SHIPPED | OUTPATIENT
Start: 2020-06-23 | End: 2020-07-24

## 2020-06-23 NOTE — TELEPHONE ENCOUNTER
Received refill request for Losartan 25mg.  Last in clinic 12/2019 where losartan was decreased to 25mg daily and she was to have BP check in one month. If remained low, trial off medication.  Per Epic, BP check did not happen.    Forwarding to Dr. Lange if just nurse visit is desired for follow-up visit with her.    30 day supply of medication sent to pharmacy.

## 2020-06-29 ENCOUNTER — HOSPITAL ENCOUNTER (OUTPATIENT)
Dept: LAB | Facility: CLINIC | Age: 40
Discharge: HOME | End: 2020-06-29
Attending: INTERNAL MEDICINE | Admitting: INTERNAL MEDICINE
Payer: COMMERCIAL

## 2020-06-29 DIAGNOSIS — D68.52 PROTHROMBIN GENE MUTATION (H): ICD-10-CM

## 2020-06-29 DIAGNOSIS — Z79.01 LONG TERM CURRENT USE OF ANTICOAGULANT THERAPY: ICD-10-CM

## 2020-06-29 LAB — INR PPP: 2.07 (ref 0.86–1.14)

## 2020-06-29 PROCEDURE — 36415 COLL VENOUS BLD VENIPUNCTURE: CPT | Performed by: INTERNAL MEDICINE

## 2020-06-29 PROCEDURE — 85610 PROTHROMBIN TIME: CPT | Performed by: INTERNAL MEDICINE

## 2020-06-29 NOTE — TELEPHONE ENCOUNTER
Received note back from Dr. Lange that she should have clinic visit with her for follow-up.    Spoke with Stacey and informed her of plan. She agreed and was forwarded to  to schedule.      Simona Lange MD  You 4 days ago       Yes, follow up with me would be best.   Thanks!

## 2020-06-30 ENCOUNTER — ANTICOAGULATION THERAPY VISIT (OUTPATIENT)
Dept: ANTICOAGULATION | Facility: CLINIC | Age: 40
End: 2020-06-30

## 2020-06-30 DIAGNOSIS — Z79.01 LONG TERM CURRENT USE OF ANTICOAGULANT THERAPY: ICD-10-CM

## 2020-06-30 DIAGNOSIS — D68.52 PROTHROMBIN GENE MUTATION (H): ICD-10-CM

## 2020-06-30 NOTE — PROGRESS NOTES
ANTICOAGULATION FOLLOW-UP CLINIC VISIT    Patient Name:  Stacey Maki  Date:  2020  Contact Type:  Telephone    SUBJECTIVE:         OBJECTIVE    Recent labs: (last 7 days)     20  1559   INR 2.07*       ASSESSMENT / PLAN  INR assessment THER    Recheck INR In: 12 WEEKS    INR Location Clinic      Anticoagulation Summary  As of 2020    INR goal:   2.0-3.0   TTR:   88.5 % (1 y)   INR used for dosin.07 (2020)   Warfarin maintenance plan:   6 mg (2 mg x 3) every Tue; 4 mg (2 mg x 2) all other days   Full warfarin instructions:   6 mg every Tue; 4 mg all other days   Weekly warfarin total:   30 mg   Plan last modified:   Karina Khoury RN (2020)   Next INR check:   2020   Priority:   High   Target end date:   Indefinite    Indications    Long term current use of anticoagulant therapy [Z79.01]  Prothrombin gene mutation (H) [D68.52]             Anticoagulation Episode Summary     INR check location:       Preferred lab:       Send INR reminders to:   [facility]    Comments:   Patient has 2 mg Tablets Patient contact number [phone]  HIPAA INFO OK to leave messages on home or cell phone leave msg on cell [phone]  Goes to [facility] for labs/results do not come to in-basket +++SEND POOL [facility]    OK to speak byron Potts      Anticoagulation Care Providers     Provider Role Specialty Phone number    Danny Clark MD Referring Hematology [phone]            See the Encounter Report to view Anticoagulation Flowsheet and Dosing Calendar (Go to Encounters tab in chart review, and find the Anticoagulation Therapy Visit)  Left message for patient with results and dosing recommendations. Asked patient to call back to report any missed doses, falls, signs and symptoms of bleeding or clotting, any changes in health, medication, or diet. Asked patient to call back with any questions or concerns.      Karina Khoury RN

## 2020-07-24 ENCOUNTER — OFFICE VISIT (OUTPATIENT)
Dept: INTERNAL MEDICINE | Facility: CLINIC | Age: 40
End: 2020-07-24
Attending: INTERNAL MEDICINE
Payer: COMMERCIAL

## 2020-07-24 VITALS
BODY MASS INDEX: 25.15 KG/M2 | DIASTOLIC BLOOD PRESSURE: 75 MMHG | WEIGHT: 142 LBS | SYSTOLIC BLOOD PRESSURE: 103 MMHG | HEART RATE: 91 BPM

## 2020-07-24 DIAGNOSIS — I10 BENIGN ESSENTIAL HYPERTENSION: Primary | ICD-10-CM

## 2020-07-24 DIAGNOSIS — F41.1 GAD (GENERALIZED ANXIETY DISORDER): ICD-10-CM

## 2020-07-24 LAB
ANION GAP SERPL CALCULATED.3IONS-SCNC: 3 MMOL/L (ref 3–14)
BUN SERPL-MCNC: 8 MG/DL (ref 7–30)
CALCIUM SERPL-MCNC: 8.9 MG/DL (ref 8.5–10.1)
CHLORIDE SERPL-SCNC: 108 MMOL/L (ref 94–109)
CHOLEST SERPL-MCNC: 194 MG/DL
CO2 SERPL-SCNC: 26 MMOL/L (ref 20–32)
CREAT SERPL-MCNC: 0.78 MG/DL (ref 0.52–1.04)
GFR SERPL CREATININE-BSD FRML MDRD: >90 ML/MIN/{1.73_M2}
GLUCOSE SERPL-MCNC: 82 MG/DL (ref 70–99)
HDLC SERPL-MCNC: 68 MG/DL
LDLC SERPL CALC-MCNC: 113 MG/DL
NONHDLC SERPL-MCNC: 126 MG/DL
POTASSIUM SERPL-SCNC: 4.3 MMOL/L (ref 3.4–5.3)
SODIUM SERPL-SCNC: 137 MMOL/L (ref 133–144)
TRIGL SERPL-MCNC: 65 MG/DL

## 2020-07-24 PROCEDURE — G0463 HOSPITAL OUTPT CLINIC VISIT: HCPCS | Mod: ZF

## 2020-07-24 PROCEDURE — 36415 COLL VENOUS BLD VENIPUNCTURE: CPT | Performed by: INTERNAL MEDICINE

## 2020-07-24 PROCEDURE — 80061 LIPID PANEL: CPT | Performed by: INTERNAL MEDICINE

## 2020-07-24 PROCEDURE — 80048 BASIC METABOLIC PNL TOTAL CA: CPT | Performed by: INTERNAL MEDICINE

## 2020-07-24 RX ORDER — LOSARTAN POTASSIUM 25 MG/1
25 TABLET ORAL DAILY
Qty: 90 TABLET | Refills: 3 | Status: SHIPPED | OUTPATIENT
Start: 2020-07-24 | End: 2021-07-23

## 2020-07-24 ASSESSMENT — PAIN SCALES - GENERAL: PAINLEVEL: NO PAIN (0)

## 2020-07-24 NOTE — PROGRESS NOTES
HPI  Patient is here for follow up on hypertension. She states that she had symptoms of itchy eyes, sore throat, and fatigue in April and May. She has tried some allergy medications, however, did not like side effects.   Patient reports that she has had some adjustment issues due to working from home and her kids needing some assistance with school.  She has not started working with psychologist yet.  She continues to take Lexapro without significant side effects.  She wants to continue with the medication as it has helped with coping.    Review of Systems     Constitutional:  Negative for fever and fatigue.   HENT:  Negative for ear pain and sinus congestion.    Respiratory:   Negative for cough and dyspnea on exertion.    Cardiovascular:  Negative for chest pain, dyspnea on exertion and edema.   Gastrointestinal:  Negative for heartburn and abdominal pain.   Musculoskeletal:  Negative for back pain.   Skin:  Negative for itching and rash.   Neurological:  Negative for dizziness.   Endo/Heme:  Negative for anemia, swollen glands and bruises/bleeds easily.   Psychiatric/Behavioral:  Positive for mood swings.    Endocrine:  Negative for altered temperature regulation, polyphagia, polydipsia, unwanted hair growth and change in facial hair.      Current Outpatient Medications   Medication     acetaminophen (ACETAMIN) 500 MG tablet     escitalopram (LEXAPRO) 20 MG tablet     losartan (COZAAR) 25 MG tablet     warfarin (COUMADIN) 2 MG tablet     warfarin ANTICOAGULANT (COUMADIN) 2 MG tablet     warfarin ANTICOAGULANT (COUMADIN) 2 MG tablet     No current facility-administered medications for this visit.      Vitals:    07/24/20 1006 07/24/20 1017 07/24/20 1018 07/24/20 1019   BP: 102/71 103/72 106/75 103/75   Pulse: 91 91 91 91   Weight: 64.4 kg (142 lb)            Physical Exam  Vitals signs and nursing note reviewed.   Constitutional:       Appearance: Normal appearance.   HENT:      Head: Normocephalic and atraumatic.       Mouth/Throat:      Mouth: Mucous membranes are moist.   Eyes:      Pupils: Pupils are equal, round, and reactive to light.   Cardiovascular:      Rate and Rhythm: Normal rate.      Pulses: Normal pulses.      Heart sounds: Normal heart sounds.   Pulmonary:      Effort: Pulmonary effort is normal.      Breath sounds: Normal breath sounds.   Musculoskeletal: Normal range of motion.         General: No edema.   Skin:     General: Skin is warm and dry.   Neurological:      General: No focal deficit present.      Mental Status: She is alert and oriented to person, place, and time. Mental status is at baseline.   Psychiatric:         Mood and Affect: Mood normal.         Behavior: Behavior normal.         Thought Content: Thought content normal.         Judgment: Judgment normal.         Assessment and plan:    Stacey was seen today for recheck.    Diagnoses and all orders for this visit:    Benign essential hypertension.  Blood pressure is currently within acceptable range.  Recommend to continue with current dose of losartan without changes.  Recommend checking basic chemistry panel as well as lipid panel.  -     losartan (COZAAR) 25 MG tablet; Take 1 tablet (25 mg) by mouth daily  -     Basic metabolic panel  -     Lipid Profile    NIKA (generalized anxiety disorder).  Discussed management of generalized anxiety disorder in the setting of adjustment difficulties.  Recommend to continue with current dose of Lexapro.  Patient was also encouraged to schedule an appointment with psychology.  She expressed understanding and agreement with the plan.    Total time spent 25 minutes.  More than 50% of the time spent with Ms. Maki on counseling / coordinating her care    Simona Lange MD

## 2020-07-24 NOTE — LETTER
7/24/2020       RE: Stacey Maki  [address] [place] [address]     Dear Colleague,    Thank you for referring your patient, Stacey Maki, to the [facility]  at [facility]. Please see a copy of my visit note below.    HPI  Patient is here for follow up on hypertension. She states that she had symptoms of itchy eyes, sore throat, and fatigue in April and May. She has tried some allergy medications, however, did not like side effects.   Patient reports that she has had some adjustment issues due to working from home and her kids needing some assistance with school.  She has not started working with psychologist yet.  She continues to take Lexapro without significant side effects.  She wants to continue with the medication as it has helped with coping.    Review of Systems     Constitutional:  Negative for fever and fatigue.   HENT:  Negative for ear pain and sinus congestion.    Respiratory:   Negative for cough and dyspnea on exertion.    Cardiovascular:  Negative for chest pain, dyspnea on exertion and edema.   Gastrointestinal:  Negative for heartburn and abdominal pain.   Musculoskeletal:  Negative for back pain.   Skin:  Negative for itching and rash.   Neurological:  Negative for dizziness.   Endo/Heme:  Negative for anemia, swollen glands and bruises/bleeds easily.   Psychiatric/Behavioral:  Positive for mood swings.    Endocrine:  Negative for altered temperature regulation, polyphagia, polydipsia, unwanted hair growth and change in facial hair.      Current Outpatient Medications   Medication     acetaminophen (ACETAMIN) 500 MG tablet     escitalopram (LEXAPRO) 20 MG tablet     losartan (COZAAR) 25 MG tablet     warfarin (COUMADIN) 2 MG tablet     warfarin ANTICOAGULANT (COUMADIN) 2 MG tablet     warfarin ANTICOAGULANT (COUMADIN) 2 MG tablet     No current facility-administered medications for this visit.      Vitals:     07/24/20 1006 07/24/20 1017 07/24/20 1018 07/24/20 1019   BP: 102/71 103/72 106/75 103/75   Pulse: 91 91 91 91   Weight: 64.4 kg (142 lb)            Physical Exam  Vitals signs and nursing note reviewed.   Constitutional:       Appearance: Normal appearance.   HENT:      Head: Normocephalic and atraumatic.      Mouth/Throat:      Mouth: Mucous membranes are moist.   Eyes:      Pupils: Pupils are equal, round, and reactive to light.   Cardiovascular:      Rate and Rhythm: Normal rate.      Pulses: Normal pulses.      Heart sounds: Normal heart sounds.   Pulmonary:      Effort: Pulmonary effort is normal.      Breath sounds: Normal breath sounds.   Musculoskeletal: Normal range of motion.         General: No edema.   Skin:     General: Skin is warm and dry.   Neurological:      General: No focal deficit present.      Mental Status: She is alert and oriented to person, place, and time. Mental status is at baseline.   Psychiatric:         Mood and Affect: Mood normal.         Behavior: Behavior normal.         Thought Content: Thought content normal.         Judgment: Judgment normal.         Assessment and plan:    Stacey was seen today for recheck.    Diagnoses and all orders for this visit:    Benign essential hypertension.  Blood pressure is currently within acceptable range.  Recommend to continue with current dose of losartan without changes.  Recommend checking basic chemistry panel as well as lipid panel.  -     losartan (COZAAR) 25 MG tablet; Take 1 tablet (25 mg) by mouth daily  -     Basic metabolic panel  -     Lipid Profile    NIKA (generalized anxiety disorder).  Discussed management of generalized anxiety disorder in the setting of adjustment difficulties.  Recommend to continue with current dose of Lexapro.  Patient was also encouraged to schedule an appointment with psychology.  She expressed understanding and agreement with the plan.    Total time spent 25 minutes.  More than 50% of the time spent with  Ms. Maki on counseling / coordinating her care    Simona Lange MD

## 2020-07-26 ASSESSMENT — ENCOUNTER SYMPTOMS
DIZZINESS: 0
FEVER: 0
DYSPNEA ON EXERTION: 0
POLYPHAGIA: 0
ALTERED TEMPERATURE REGULATION: 0
BRUISES/BLEEDS EASILY: 0
HEARTBURN: 0
BACK PAIN: 0
ABDOMINAL PAIN: 0
SWOLLEN GLANDS: 0
COUGH: 0
POLYDIPSIA: 0
FATIGUE: 0
NERVOUS/ANXIOUS: 1
SINUS CONGESTION: 0

## 2020-07-30 ENCOUNTER — VIRTUAL VISIT (OUTPATIENT)
Dept: HEMATOLOGY | Facility: CLINIC | Age: 40
End: 2020-07-30
Attending: PHYSICIAN ASSISTANT
Payer: COMMERCIAL

## 2020-07-30 DIAGNOSIS — D68.52 PROTHROMBIN GENE MUTATION (H): ICD-10-CM

## 2020-07-30 DIAGNOSIS — Z79.01 LONG TERM CURRENT USE OF ANTICOAGULANT THERAPY: Primary | ICD-10-CM

## 2020-07-30 PROCEDURE — 99212 OFFICE O/P EST SF 10 MIN: CPT | Mod: 95 | Performed by: PHYSICIAN ASSISTANT

## 2020-07-30 PROCEDURE — 40001009 ZZH VIDEO/TELEPHONE VISIT; NO CHARGE

## 2020-07-30 RX ORDER — WARFARIN SODIUM 2 MG/1
TABLET ORAL
Qty: 65 TABLET | Refills: 6 | Status: SHIPPED | OUTPATIENT
Start: 2020-07-30 | End: 2021-03-03

## 2020-07-30 NOTE — PATIENT INSTRUCTIONS
Stacey,    It was nice to see you via video visit today.    Below is what we have discussed:  1. Continue Coumadin as your mainstay of anticoagulation therapy. Goal INR remains to be at 2.0-3.0.  2. Please call our center at [phone] if you should experience any unexpected bleeding issues or if you should need any invasive or surgical procedures.   3. One of our administrative assistants will call you to schedule a return visit with me in 1 year.    Thank you once again in choosing our clinic as part of your healthcare team.      Noramn Albert PA-C, MPAS  Physician Assistant  [facility].

## 2020-07-30 NOTE — PROGRESS NOTES
Patient was contacted to complete the pre-visit call prior to their video visit with the provider.  The following statement was read:       This visit will be billed to your insurance the same as an in-person visit. Because of Coronavirus we are instituting video visits when possible to keep everyone safe. This video visit will be conducted between you and the provider.  This service lets us provide the care you need with a video conversation.  If a prescription is necessary, we can send it directly to your pharmacy.If lab work or other testing is needed, we can help arrange a place/time for that to be done at a later date.If during the course of the call the provider feels a video visit is not appropriate, then your insurance company will not be billed.       Allergies and medications were reviewed and travel screening complete.     A test connection was Completed with Pt? No    I thanked them for their time to cover this information     Sandra Hamilton  Due to Virtual Visit, verbal consent received for Care Everywhere Authorization from the patient during this visit.

## 2020-07-30 NOTE — PROGRESS NOTES
[facility]  [address] [place] [id number]  Main: [phone], Fax: [phone]    Patient seen at: [facility] at [address] [place] [address]    Video Virtual Visit Note:    Patient: Stacey Maki  MRN: [id number]  : 1980  MARTA: 2020    Due to the ongoing COVID-19 outbreak, this visit was conducted by video, with the patient's approval.    Physician has received verbal consent for a Video Visit from the patient? Yes    Patient log in via schoox    Video Start Time: 12:56    Reason of today's visit:  History of cerebral venous sinus thrombosis on long term anticoagulation therapy. Here for her routine annual follow up clinic visit.      Clinical History Summary:  Stacey Maki is a 40-year-old woman with a history of cerebral venous sinus thrombosis, currently on indefinite anticoagulation therapy with Coumadin, participates in today's scheduled video visit for her routine annual follow up visit. She was last seen by this writer back in 2019.     In summary, she presented with a cerebral venous sinus thrombosis in , which was estrogen-provoked.  She was anticoagulated in the usual manner and subsequently found to be heterozygous for the prothrombin gene mutation.  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  She has been followed by Dr. Barragan in [facility], and continues to have a significantly abnormal flow.  Please see Dr. Barragan's note from 2013 for details.  In summary, she has chronic left sigmoid sinus occlusion, as well as some occlusion in the upper part of the left internal jugular vein.  There is a dilated superficial vein that is now draining this sinus.  She does have flow through the right sigmoid sinus, but there are areas of stenosis and irregularity, including an area of stenosis in the high right jugular vein.       Because of her abnormal flow, there has been concern about her risk of recurrent thrombosis should she be taken off anticoagulation.  She has done very well on anticoagulation with good control of her INR  and no bleeding symptoms.  She is highly motivated and compliant.  Thus, she appears to be a good candidate for long-term anticoagulation from that perspective.      Interim History:  She has maintained on indefinite anticoagulation therapy with Coumadin. She reports that she has done very well on Coumadin without any issues. Her INR has been mostly within therapeutic range of 2.0-3.0. She denies any issues with bleeding complications. She is perimenopausal but do not think that her period is heavy. She denies any frequent epistaxis. Denies any bruising. Denies any oral mucosal bleeding. Denies any hematuria or blood in stools.     ROS:  As above. No shortness of breath or cough or any chest pain. No fever.    Medication, Allergies and PmHx:  All have been reviewed by this writer in the electronic medical records.    Social History and Family History:  Deferred.    Objective:  Pleasant in no acute distress.  Visual Examination via Video:  Complete exam is not performed today.    Labs:  Component      Latest Ref Rng & Units 5/7/2019 6/7/2019 7/11/2019 9/5/2019   INR      0.86 - 1.14 1.85 (H) 2.20 (H) 2.28 (H) 2.44 (H)     Component      Latest Ref Rng & Units 11/1/2019 12/2/2019 12/17/2019 1/24/2020   INR      0.86 - 1.14 2.52 (H) 1.45 (H) 1.77 (H) 2.59 (H)     Component      Latest Ref Rng & Units 4/7/2020 6/29/2020   INR      0.86 - 1.14 2.94 (H) 2.07 (H)       Assessment:  In summary, Stacey is a 40 year old female with a history of cerebral sinus thrombosis back in 2007 that was estrogen provoked who has been on indefinite anticoagulation therapy with Coumadin after she was found to have abnormal cerebral venous flow, participates in today's scheduled video visit for her routine annual follow up visit in  regard to chronic anticoagulation therapy use. Her last visit with this center was March 2019.      Stacey has remain on Coumadin as her mainstay of indefinite anticoagulation therapy with her goal INR of 2.0-3.0. She has done very well with this and has no issues with bleeding comlications. She is not interested in switching anticoagulation therapy to any direct oral anticoagulant agents as having be able to get INR testing done give her reassurance.      Diagnosis:  1. History of Cerebral Sinus Thrombosis.  2. Heterozygous Prothrombin Gene Mutation.  3. On chronic anticoagulation therapy with Coumadin.     Plan:  No change in regard to her anticoagulation therapy. She is to continue with Coumadin with her INR goal of 2.0-3.0. She knows to contact our center if she should have any bleeding complications. She also knows to contact our center if she should need any invasive or surgical procedures in the future, at which time, our clinic will provide her with anticoagulation therapy management plan surrounding these procedures.     She would like to get a CBC done with her next INR check, which is reasonable.    We will plan on seeing her back in one year time for follow up visit.     Video-Visit Details:    Type of service:  Video Visit    Video End Time (time video stopped): 13:09    Originating Location (pt. Location): Home    Distant Location (provider location):  [facility]     Mode of Communication:  Video Conference via Santhosh Albert PA-C, MPAS  Physician Assistant  [facility].

## 2020-10-06 ENCOUNTER — HOSPITAL ENCOUNTER (OUTPATIENT)
Dept: LAB | Facility: CLINIC | Age: 40
Discharge: HOME | End: 2020-10-06
Attending: INTERNAL MEDICINE | Admitting: INTERNAL MEDICINE
Payer: COMMERCIAL

## 2020-10-06 DIAGNOSIS — Z79.01 LONG TERM CURRENT USE OF ANTICOAGULANT THERAPY: ICD-10-CM

## 2020-10-06 DIAGNOSIS — D68.52 PROTHROMBIN GENE MUTATION (H): ICD-10-CM

## 2020-10-06 LAB
ERYTHROCYTE [DISTWIDTH] IN BLOOD BY AUTOMATED COUNT: 14.4 % (ref 10–15)
HCT VFR BLD AUTO: 39.2 % (ref 35–47)
HGB BLD-MCNC: 12.4 G/DL (ref 11.7–15.7)
INR PPP: 3.17 (ref 0.86–1.14)
MCH RBC QN AUTO: 29.7 PG (ref 26.5–33)
MCHC RBC AUTO-ENTMCNC: 31.6 G/DL (ref 31.5–36.5)
MCV RBC AUTO: 94 FL (ref 78–100)
PLATELET # BLD AUTO: 217 10E9/L (ref 150–450)
RBC # BLD AUTO: 4.17 10E12/L (ref 3.8–5.2)
WBC # BLD AUTO: 7 10E9/L (ref 4–11)

## 2020-10-06 PROCEDURE — 36415 COLL VENOUS BLD VENIPUNCTURE: CPT | Performed by: INTERNAL MEDICINE

## 2020-10-06 PROCEDURE — 85610 PROTHROMBIN TIME: CPT | Performed by: INTERNAL MEDICINE

## 2020-10-06 PROCEDURE — 85027 COMPLETE CBC AUTOMATED: CPT | Performed by: INTERNAL MEDICINE

## 2020-10-07 ENCOUNTER — ANTICOAGULATION THERAPY VISIT (OUTPATIENT)
Dept: ANTICOAGULATION | Facility: CLINIC | Age: 40
End: 2020-10-07

## 2020-10-07 DIAGNOSIS — Z86.718 HISTORY OF CEREBRAL VENOUS SINUS THROMBOSIS: ICD-10-CM

## 2020-10-07 DIAGNOSIS — Z79.01 LONG TERM CURRENT USE OF ANTICOAGULANT THERAPY: ICD-10-CM

## 2020-10-07 DIAGNOSIS — D68.52 PROTHROMBIN GENE MUTATION (H): ICD-10-CM

## 2020-10-07 DIAGNOSIS — D68.52 PROTHROMBIN GENE MUTATION (H): Primary | ICD-10-CM

## 2020-10-07 PROCEDURE — 99207 PR DROP WITH A PROCEDURE: CPT | Performed by: INTERNAL MEDICINE

## 2020-10-07 NOTE — PROGRESS NOTES
ANTICOAGULATION FOLLOW-UP CLINIC VISIT    Patient Name:  Stacey Maki  Date:  10/7/2020  Contact Type:  Telephone    SUBJECTIVE:         OBJECTIVE    Recent labs: (last 7 days)     10/06/20  1518   INR 3.17*       ASSESSMENT / PLAN  INR assessment SUPRA    Recheck INR In: 3 WEEKS    INR Location Clinic      Anticoagulation Summary  As of 10/7/2020    INR goal:  2.0-3.0   TTR:  84.3 % (8.8 mo)   INR used for dosing:  3.17 (10/6/2020)   Warfarin maintenance plan:  6 mg (2 mg x 3) every Tue; 4 mg (2 mg x 2) all other days   Full warfarin instructions:  10/7: 2 mg; Otherwise 6 mg every Tue; 4 mg all other days   Weekly warfarin total:  30 mg   Plan last modified:  Karina Khoury RN (4/7/2020)   Next INR check:  10/28/2020   Priority:  High   Target end date:  Indefinite    Indications    Long term current use of anticoagulant therapy [Z79.01]  Prothrombin gene mutation (H) [D68.52]             Anticoagulation Episode Summary     INR check location:      Preferred lab:      Send INR reminders to:   [facility]    Comments:  Patient has 2 mg Tablets Patient contact number [phone]  HIPAA INFO OK to leave messages on home or cell phone leave msg on cell [phone]  Goes to [facility] for labs/results do not come to in-basket +++SEND POOL MESSAG    OK to speak byron Potts      Anticoagulation Care Providers     Provider Role Specialty Phone number    Danny Clark MD Referring Hematology [phone]            See the Encounter Report to view Anticoagulation Flowsheet and Dosing Calendar (Go to Encounters tab in chart review, and find the Anticoagulation Therapy Visit)    Left message for patient with results and dosing recommendations. Asked patient to call back to report any missed doses, falls, signs and symptoms of bleeding or clotting, any changes in health, medication, or diet. Asked patient to call back with any questions or concerns.      Adam Koch [facility]

## 2020-11-04 ASSESSMENT — ENCOUNTER SYMPTOMS
DEPRESSION: 1
FATIGUE: 1
WEIGHT GAIN: 1
MUSCLE CRAMPS: 1
SINUS PAIN: 0
NERVOUS/ANXIOUS: 1
MUSCLE WEAKNESS: 1
DECREASED CONCENTRATION: 0
PANIC: 0
POLYDIPSIA: 0
POOR WOUND HEALING: 0
HALLUCINATIONS: 0
ALTERED TEMPERATURE REGULATION: 0
NAIL CHANGES: 0
SMELL DISTURBANCE: 0
CHILLS: 0
NIGHT SWEATS: 1
MYALGIAS: 1
DECREASED APPETITE: 0
HOARSE VOICE: 0
NECK MASS: 0
SORE THROAT: 1
JOINT SWELLING: 0
STIFFNESS: 1
SKIN CHANGES: 1
INCREASED ENERGY: 1
TASTE DISTURBANCE: 0
BACK PAIN: 1
INSOMNIA: 1
SINUS CONGESTION: 0
POLYPHAGIA: 1
ARTHRALGIAS: 1
TROUBLE SWALLOWING: 0
WEIGHT LOSS: 0
NECK PAIN: 1
FEVER: 0

## 2020-11-04 ASSESSMENT — ANXIETY QUESTIONNAIRES
GAD7 TOTAL SCORE: 7
5. BEING SO RESTLESS THAT IT IS HARD TO SIT STILL: NOT AT ALL
6. BECOMING EASILY ANNOYED OR IRRITABLE: MORE THAN HALF THE DAYS
3. WORRYING TOO MUCH ABOUT DIFFERENT THINGS: SEVERAL DAYS
1. FEELING NERVOUS, ANXIOUS, OR ON EDGE: MORE THAN HALF THE DAYS
7. FEELING AFRAID AS IF SOMETHING AWFUL MIGHT HAPPEN: NOT AT ALL
4. TROUBLE RELAXING: SEVERAL DAYS
2. NOT BEING ABLE TO STOP OR CONTROL WORRYING: SEVERAL DAYS
7. FEELING AFRAID AS IF SOMETHING AWFUL MIGHT HAPPEN: NOT AT ALL
GAD7 TOTAL SCORE: 7

## 2020-11-05 ENCOUNTER — TELEPHONE (OUTPATIENT)
Dept: OBGYN | Facility: CLINIC | Age: 40
End: 2020-11-05

## 2020-11-05 ENCOUNTER — OFFICE VISIT (OUTPATIENT)
Dept: OBGYN | Facility: CLINIC | Age: 40
End: 2020-11-05
Attending: ADVANCED PRACTICE MIDWIFE
Payer: COMMERCIAL

## 2020-11-05 VITALS
BODY MASS INDEX: 26.38 KG/M2 | HEART RATE: 82 BPM | SYSTOLIC BLOOD PRESSURE: 120 MMHG | DIASTOLIC BLOOD PRESSURE: 79 MMHG | WEIGHT: 148.9 LBS | HEIGHT: 63 IN

## 2020-11-05 DIAGNOSIS — R53.83 FEELING TIRED: ICD-10-CM

## 2020-11-05 DIAGNOSIS — F32.A ANXIETY AND DEPRESSION: ICD-10-CM

## 2020-11-05 DIAGNOSIS — F32.81 PREMENSTRUAL DYSPHORIC DISORDER: ICD-10-CM

## 2020-11-05 DIAGNOSIS — Z00.00 VISIT FOR PREVENTIVE HEALTH EXAMINATION: Primary | ICD-10-CM

## 2020-11-05 DIAGNOSIS — R06.83 SNORING: ICD-10-CM

## 2020-11-05 DIAGNOSIS — F41.9 ANXIETY AND DEPRESSION: ICD-10-CM

## 2020-11-05 PROCEDURE — 99396 PREV VISIT EST AGE 40-64: CPT | Performed by: ADVANCED PRACTICE MIDWIFE

## 2020-11-05 ASSESSMENT — MIFFLIN-ST. JEOR: SCORE: 1304.41

## 2020-11-05 ASSESSMENT — ANXIETY QUESTIONNAIRES: GAD7 TOTAL SCORE: 7

## 2020-11-05 ASSESSMENT — PAIN SCALES - GENERAL: PAINLEVEL: MILD PAIN (3)

## 2020-11-05 NOTE — LETTER
"2020       RE: Stacey Maki  [address] [place] [address]     Dear Colleague,    Thank you for referring your patient, Stacey Maki, to the [facility] [place] at [facility]. Please see a copy of my visit note below.        Progress Note    SUBJECTIVE:  Stacey Maki is an 40 year old, , who requests an Annual Preventive Exam.     Concerns today include:   Period really every 28 days lasting about 1-2 days. Tampons. Ultra on heaviest days, usually super plus then regular. +clots. +cramping. +headaches but feel like they're more stress related/tension.  Depression sets in and mood changes  +bloating.      - PMS was only the issue that really needed intervention starting age 37yo and started PMDD interval dosing with some success but anxiety started to creep in.  Then pre covid anxiety and blood pressure started to increase as well - gaining weight, stress, etc. Changed to Lexapro which is helping at times but has to take 40mg during PMS.  Son has ADHD undiagnosed but stress of behavior.anxiety.  Older child has depression. Has considered Mirena previously for menses control. Lost 20 lbs through exercise but regained 10lb during COVID.  [facility] but during [facility] didn't even feel like going to gym. Has done meditation in [place] and has taken classes.  Has talked about counseling - has masters in counseling/work at the  as academic advisort.  Was doing massages pre covid. Sleeps \"a lot\" - last week slept for ~12 hours.  Currently working from home.  Routine off .  Starting a 10am, nap at 3:30 shower at 5pm, up until 1030pm.  Unsure if she has sleep apnea, her mom does.  Partner says she snores.     Kids in hybrid schooling with in person 2 days/ week.  Wearing masks.  Schools haven't had any COVID outbreaks so far!    Partner works as an  manager, following COVID masking, daily temps.  " Feels safe in relationship.  Different parenting styles but no issues of safety.  No history of abuse or sexual violence    Has mammogram scheduled 12/5, agreeable to clinical breast exam today.     Desires pelvic exam despite Pap up to date. No intermenstrual vaginal bleeding, post coital bleeding, pain with sex, vaginal malodor, itching/irritation, or change in discharge.  Declines STI testing. Not as interested in sex life - tired, less desire for sexual intimacy with anxiety at present. Partner vasectomy to birth control.     Has 3 skin tags vs wart left inner thigh/groin that she's noticed over the past year or so with weight gain.  Had warts on her fingers - had 20 when she was 4yo that were removed.  Has multiple moles - has dermatologist who does body check, is due for a body check. Gets cold sores at times.     Declines flu vaccine.     Is closely followed by team for her anti colagulation plan.     Menstrual History:  Menstrual History 7/24/2020 11/5/2020 11/5/2020   LAST MENSTRUAL PERIOD 7/4/2020 10/18/2020 -   Menarche Age - - 11   Period Cycle (Days) - - 28   Period Duration (Days) - - 5   Method of Contraception - - Vasectomy   Period Pattern - - Regular   Menstrual Flow - - Heavy   Menstrual Control - - -   Dysmenorrhea - - Mild   PMS Symptoms - - Cramping;Mood Changes   Reviewed Today - - Yes       Last    Lab Results   Component Value Date    PAP NIL 11/01/2019     History of abnormal Pap smear: NO - age 30-65 PAP every 5 years with negative HPV co-testing recommended    Last   Lab Results   Component Value Date    HPV16 Negative 11/01/2019     Last   Lab Results   Component Value Date    HPV18 Negative 11/01/2019     Last   Lab Results   Component Value Date    HRHPV Negative 11/01/2019       Mammogram current:  Scheduled for 12/5/2020  Last Mammogram:   Ma Screening Digital Bilateral    Result Date: 12/4/2019  Narrative: EXAM: MA SCREENING DIGITAL BILATERAL with CAD  12/2/2019 10:10 AM  INDICATION/FAMILY HISTORY:  Asymptomatic screening. One first-degree relative with breast cancer diagnosed at age 62. COMPARISON:  11/19/2018, 9/18/2014 BREAST DENSITY: Heterogeneously dense. COMMENTS: Possible asymmetry on the left posterior nipple line, mid to posterior depth, seen on CC. No suspicious findings on the right.     Ma Screening Digital Bilateral    Result Date: 11/19/2018  Narrative: Examination: MA SCREENING DIGITAL BILATERAL, COMPUTER AIDED DETECTION 11/19/2018 9:08 AM Comparison: 9/18/2014 History/Family History: Asymptomatic screening. Breast Density: Heterogeneously dense. Technique: Standard mammographic views were performed . Findings: There has been no significant interval change or suspicious findings in either breast.     Us Breast Left Limited 1-3 Quadrants    Result Date: 12/13/2019  Narrative: EXAM: Diagnostic mammography,  left , with tomosynthesis, left breast ultrasound TECHNIQUE: Tomosynthesis (3D) History: Screening call back Findings: Additional views by mammography and focused ultrasound by radiologist and technologist were used to further evaluate possible asymmetry in the left posterior nipple line noted on screening mammogram 12/2/2019. Additional mammographic views and ultrasound demonstrated only benign-appearing breast tissue and no significant change compared to prior studies.        Last Colonoscopy:  No results found for this or any previous visit.      HISTORY:       acetaminophen (ACETAMIN) 500 MG tablet, Take 2 tablets by mouth every 6 hours as needed.       escitalopram (LEXAPRO) 20 MG tablet, Take 1 tablet (20 mg) by mouth daily       losartan (COZAAR) 25 MG tablet, Take 1 tablet (25 mg) by mouth daily       warfarin (COUMADIN) 2 MG tablet, Take 6mgs on Wednesdays and 4mgs on all other days or  as directed by anticoagulation clinic.       warfarin ANTICOAGULANT (COUMADIN) 2 MG tablet, Take 2 to 3 tablets by mouth daily or as directed by the Coumadin clinic.        warfarin ANTICOAGULANT (COUMADIN) 2 MG tablet, Take 2-3 tablets daily or as directed by coumadin clinic.    No current facility-administered medications on file prior to visit.     Allergies   Allergen Reactions     Benzoyl Peroxide Swelling     Swollen eyelids     Seasonal Allergies Other (See Comments)     Runny nose, itchy eyes, breathing issues, and fatigue     Immunization History   Administered Date(s) Administered     FLU 6-35 months 10/24/2006     Flu, Unspecified 10/24/2006     TDAP Vaccine (Adacel) 2011     Td (Adult), Adsorbed 1998     Tdap (Adacel,Boostrix) 2011       OB History    Para Term  AB Living   2 2 2 0 0 2   SAB TAB Ectopic Multiple Live Births   0 0 0 0 2     Past Medical History:   Diagnosis Date     Arteriovenous malformation      Asthma      Chronic cerebral venous sinus thrombosis     diagnosed in , 2009 5wk preg.      Heart disease     mother     Hypertension 2016    mother     Idiopathic intracranial hypertension      Papilledema      Prothrombin gene mutation (H)     heterozygous type     Tension headache      Past Surgical History:   Procedure Laterality Date     cerebral angiogram      to discuss management of neural- based fistula      SECTION       ENT SURGERY  1983    adnoids removed/tubes in ears at age 3     GYN SURGERY      C section x 2     IR INTRACRANIAL EMBOLIZATION RIGHT  2011     MRI last Nov.      NO HISTORY OF SURGERY  3/20/14    derm     VASCULAR SURGERY      fistula repaired in brain     Family History   Problem Relation Age of Onset     Cerebrovascular Disease Paternal Grandmother      Cancer - colorectal Paternal Grandfather      Colon Cancer Paternal Grandfather      Alcohol/Drug Father      Substance Abuse Father      Alcohol/Drug Maternal Grandmother      Cerebrovascular Disease Other         paternal uncle     Anxiety Disorder Mother      Depression Mother      Obesity Mother      Hypertension Mother       Breast Cancer Mother 62        Stage 0     Cancer No family hx of         no skin cancer     Skin Cancer No family hx of      Melanoma No family hx of      Social History     Socioeconomic History     Marital status:      Spouse name: Edson     Number of children: 2     Years of education: 18     Highest education level: None   Occupational History     Employer: [employer]     Comment:    Social Needs     Financial resource strain: None     Food insecurity     Worry: None     Inability: None     Transportation needs     Medical: None     Non-medical: None   Tobacco Use     Smoking status: Never Smoker     Smokeless tobacco: Never Used   Substance and Sexual Activity     Alcohol use: Yes     Comment: 2-4 drinks per month     Drug use: No     Sexual activity: Yes     Partners: Male     Birth control/protection: Male Surgical     Comment: vasectomy   Lifestyle     Physical activity     Days per week: None     Minutes per session: None     Stress: None   Relationships     Social connections     Talks on phone: None     Gets together: None     Attends Jain service: None     Active member of club or organization: None     Attends meetings of clubs or organizations: None     Relationship status: None     Intimate partner violence     Fear of current or ex partner: None     Emotionally abused: None     Physically abused: None     Forced sexual activity: None   Other Topics Concern     Parent/sibling w/ CABG, MI or angioplasty before 65F 55M? Not Asked      Service No     Blood Transfusions No     Caffeine Concern No     Comment: magdy tea in am     Occupational Exposure No     Hobby Hazards No     Sleep Concern No     Stress Concern No     Weight Concern No     Special Diet No     Back Care No     Exercise No     Bike Helmet No     Seat Belt No     Self-Exams No   Social History Narrative    How much exercise per week? Walks 5 days a weekHow much calcium per day? Eats a lot of  "dairy products daily. 3-4 times   How much caffeine per day? 1 cup a dayHow much vitamin D per day? DietDo you/your family wear seatbelts?  YesDo you/your family use safety helmets? YesDo you/your family use sunscreen? YesDo you/your family keep firearms in the home? NoDo you/your family have a smoke detector(s)? YesDo you feel safe in your home? YesHas anyone ever touched you in an unwanted manner? No    Renetta Shaikh CMA    10/05/2015        Reviewed [facility] 11-5-20       ROS   ROS: 10 point ROS neg other than the symptoms noted above in the HPI.-    PHQ-9 SCORE 10/6/2016 8/17/2018 11/1/2019   PHQ-9 Total Score - - -   PHQ-9 Total Score 8 4 4     NIKA-7 SCORE 10/29/2018 11/1/2019 11/4/2020   Total Score 3 (minimal anxiety) - 7 (mild anxiety)   Total Score 3 4 7         EXAM:  Blood pressure 120/79, pulse 82, height 1.6 m (5' 2.99\"), weight 67.5 kg (148 lb 14.4 oz), last menstrual period 10/18/2020, not currently breastfeeding. Body mass index is 26.38 kg/m .  General - pleasant female in no acute distress.  Skin - no suspicious rashes.   +3 non pustule ~1mm skin tags on left upper thigh. NT, no redness, no drainage.  See media for picutre taken with patient consent.   EENT-  PERRLA, euthyroid with out palpable nodules  Neck - supple without lymphadenopathy.  Lungs - clear to auscultation bilaterally.  Heart - regular rate and rhythm without murmur.  Abdomen - soft, nontender, nondistended, no masses or organomegaly noted.  Musculoskeletal - no gross deformities.  Neurological - normal strength, sensation, and mental status.    Breast Exam:  Breast: Without visible skin changes. No dimpling or lesions seen.   Breasts supple, non-tender with palpation, no dominant mass, nodularity, or nipple discharge noted bilaterally. Axillary nodes negative.      Pelvic Exam:  EG/BUS: Pubic hair waxed Normal genital architecture without lesions or abnormal secretions. [place] erythema noted on vulva and at introitus c/w wax " yesterday.  [place]'s, Urethra, [place]'s normal   Urethral meatus: normal   Urethra: no masses, tenderness, or scarring   Bladder: no masses or tenderness   Vagina: moist, pink, rugae with creamy, white and odorless  secretions  Cervix: no lesions and pink, moist, closed, without lesion or CMT  Uterus: anteverted,  and small, smooth, firm, mobile w/o pain  Adnexa: Within normal limits and No masses, nodularity, tenderness  Rectum:anus normal     Next Pap with HPV co testing due 2024    ASSESSMENT:  Encounter Diagnoses   Name Primary?     Visit for preventive health examination Yes     Snoring      Feeling tired      Premenstrual dysphoric disorder      Anxiety and depression         PLAN:   Orders Placed This Encounter   Procedures     SLEEP EVALUATION & MANAGEMENT REFERRAL - ADULT -[facility] [place] [phone] (Age 15 and up)     MENTAL HEALTH REFERRAL  - Adult; Outpatient Treatment; Individual/Couples/Family/Group Therapy/Health Psychology; Other: [facility] [phone]; We will contact you to schedule the appointment or please call with any questions         Additional teaching done at this visit regarding self breast exam, exercise, PMS, perimenopause and mental health.    - Referral to Dr. Alcazar for discussion of current medication regimen for anxiety/depression and PMS vs PMDD.    - Referral to Dr. Esteban for talk therapy for anxiety/depression.    - Recommend additional modalities including acupuncture, reiki, massage with COVID precautions.   - Recommend follow up with dermatology for mole check and removal of skin tags on thigh  - Referral to sleep center for work up on possible sleep apnea  - Reinforced follow up before next annual exam prn   - Return to clinic in one year and prn     JORDAN Sarabia CNM      Answers for HPI/ROS submitted by the patient on 11/4/2020   NIKA 7 TOTAL SCORE: 7

## 2020-11-05 NOTE — PROGRESS NOTES
"    Progress Note    SUBJECTIVE:  Stacey Maki is an 40 year old, , who requests an Annual Preventive Exam.     Concerns today include:   Period really every 28 days lasting about 1-2 days. Tampons. Ultra on heaviest days, usually super plus then regular. +clots. +cramping. +headaches but feel like they're more stress related/tension.  Depression sets in and mood changes  +bloating.      - PMS was only the issue that really needed intervention starting age 37yo and started PMDD interval dosing with some success but anxiety started to creep in.  Then pre covid anxiety and blood pressure started to increase as well - gaining weight, stress, etc. Changed to Lexapro which is helping at times but has to take 40mg during PMS.  Son has ADHD undiagnosed but stress of behavior.anxiety.  Older child has depression. Has considered Mirena previously for menses control. Lost 20 lbs through exercise but regained 10lb during COVID.  [facility] but during [facility] didn't even feel like going to gym. Has done meditation in [place] and has taken classes.  Has talked about counseling - has masters in counseling/work at the  as academic advisort.  Was doing massages pre covid. Sleeps \"a lot\" - last week slept for ~12 hours.  Currently working from home.  Routine off .  Starting a 10am, nap at 3:30 shower at 5pm, up until 1030pm.  Unsure if she has sleep apnea, her mom does.  Partner says she snores.     Kids in hybrid schooling with in person 2 days/ week.  Wearing masks.  Schools haven't had any COVID outbreaks so far!    Partner works as an  manager, following COVID masking, daily temps.  Feels safe in relationship.  Different parenting styles but no issues of safety.  No history of abuse or sexual violence    Has mammogram scheduled , agreeable to clinical breast exam today.     Desires pelvic exam despite Pap up to date. No intermenstrual vaginal bleeding, post coital bleeding, pain with " sex, vaginal malodor, itching/irritation, or change in discharge.  Declines STI testing. Not as interested in sex life - tired, less desire for sexual intimacy with anxiety at present. Partner vasectomy to birth control.     Has 3 skin tags vs wart left inner thigh/groin that she's noticed over the past year or so with weight gain.  Had warts on her fingers - had 20 when she was 2yo that were removed.  Has multiple moles - has dermatologist who does body check, is due for a body check. Gets cold sores at times.     Declines flu vaccine.     Is closely followed by team for her anti colagulation plan.     Menstrual History:  Menstrual History 7/24/2020 11/5/2020 11/5/2020   LAST MENSTRUAL PERIOD 7/4/2020 10/18/2020 -   Menarche Age - - 11   Period Cycle (Days) - - 28   Period Duration (Days) - - 5   Method of Contraception - - Vasectomy   Period Pattern - - Regular   Menstrual Flow - - Heavy   Menstrual Control - - -   Dysmenorrhea - - Mild   PMS Symptoms - - Cramping;Mood Changes   Reviewed Today - - Yes       Last    Lab Results   Component Value Date    PAP NIL 11/01/2019     History of abnormal Pap smear: NO - age 30-65 PAP every 5 years with negative HPV co-testing recommended    Last   Lab Results   Component Value Date    HPV16 Negative 11/01/2019     Last   Lab Results   Component Value Date    HPV18 Negative 11/01/2019     Last   Lab Results   Component Value Date    HRHPV Negative 11/01/2019       Mammogram current:  Scheduled for 12/5/2020  Last Mammogram:   Ma Screening Digital Bilateral    Result Date: 12/4/2019  Narrative: EXAM: MA SCREENING DIGITAL BILATERAL with CAD  12/2/2019 10:10 AM INDICATION/FAMILY HISTORY:  Asymptomatic screening. One first-degree relative with breast cancer diagnosed at age 62. COMPARISON:  11/19/2018, 9/18/2014 BREAST DENSITY: Heterogeneously dense. COMMENTS: Possible asymmetry on the left posterior nipple line, mid to posterior depth, seen on CC. No suspicious findings on the  right.     Ma Screening Digital Bilateral    Result Date: 11/19/2018  Narrative: Examination: MA SCREENING DIGITAL BILATERAL, COMPUTER AIDED DETECTION 11/19/2018 9:08 AM Comparison: 9/18/2014 History/Family History: Asymptomatic screening. Breast Density: Heterogeneously dense. Technique: Standard mammographic views were performed . Findings: There has been no significant interval change or suspicious findings in either breast.     Us Breast Left Limited 1-3 Quadrants    Result Date: 12/13/2019  Narrative: EXAM: Diagnostic mammography,  left , with tomosynthesis, left breast ultrasound TECHNIQUE: Tomosynthesis (3D) History: Screening call back Findings: Additional views by mammography and focused ultrasound by radiologist and technologist were used to further evaluate possible asymmetry in the left posterior nipple line noted on screening mammogram 12/2/2019. Additional mammographic views and ultrasound demonstrated only benign-appearing breast tissue and no significant change compared to prior studies.        Last Colonoscopy:  No results found for this or any previous visit.      HISTORY:       acetaminophen (ACETAMIN) 500 MG tablet, Take 2 tablets by mouth every 6 hours as needed.       escitalopram (LEXAPRO) 20 MG tablet, Take 1 tablet (20 mg) by mouth daily       losartan (COZAAR) 25 MG tablet, Take 1 tablet (25 mg) by mouth daily       warfarin (COUMADIN) 2 MG tablet, Take 6mgs on Wednesdays and 4mgs on all other days or  as directed by anticoagulation clinic.       warfarin ANTICOAGULANT (COUMADIN) 2 MG tablet, Take 2 to 3 tablets by mouth daily or as directed by the Coumadin clinic.       warfarin ANTICOAGULANT (COUMADIN) 2 MG tablet, Take 2-3 tablets daily or as directed by coumadin clinic.    No current facility-administered medications on file prior to visit.     Allergies   Allergen Reactions     Benzoyl Peroxide Swelling     Swollen eyelids     Seasonal Allergies Other (See Comments)     Runny nose,  itchy eyes, breathing issues, and fatigue     Immunization History   Administered Date(s) Administered     FLU 6-35 months 10/24/2006     Flu, Unspecified 10/24/2006     TDAP Vaccine (Adacel) 2011     Td (Adult), Adsorbed 1998     Tdap (Adacel,Boostrix) 2011       OB History    Para Term  AB Living   2 2 2 0 0 2   SAB TAB Ectopic Multiple Live Births   0 0 0 0 2     Past Medical History:   Diagnosis Date     Arteriovenous malformation      Asthma      Chronic cerebral venous sinus thrombosis     diagnosed in , 2009 5wk preg.      Heart disease     mother     Hypertension 2016    mother     Idiopathic intracranial hypertension      Papilledema      Prothrombin gene mutation (H)     heterozygous type     Tension headache      Past Surgical History:   Procedure Laterality Date     cerebral angiogram      to discuss management of neural- based fistula      SECTION       ENT SURGERY      adnoids removed/tubes in ears at age 3     GYN SURGERY      C section x 2     IR INTRACRANIAL EMBOLIZATION RIGHT  2011     MRI last Nov.      NO HISTORY OF SURGERY  3/20/14    derm     VASCULAR SURGERY      fistula repaired in brain     Family History   Problem Relation Age of Onset     Cerebrovascular Disease Paternal Grandmother      Cancer - colorectal Paternal Grandfather      Colon Cancer Paternal Grandfather      Alcohol/Drug Father      Substance Abuse Father      Alcohol/Drug Maternal Grandmother      Cerebrovascular Disease Other         paternal uncle     Anxiety Disorder Mother      Depression Mother      Obesity Mother      Hypertension Mother      Breast Cancer Mother 62        Stage 0     Cancer No family hx of         no skin cancer     Skin Cancer No family hx of      Melanoma No family hx of      Social History     Socioeconomic History     Marital status:      Spouse name: Edson     Number of children: 2     Years of education: 18     Highest education  level: None   Occupational History     Employer: [employer]     Comment:    Social Needs     Financial resource strain: None     Food insecurity     Worry: None     Inability: None     Transportation needs     Medical: None     Non-medical: None   Tobacco Use     Smoking status: Never Smoker     Smokeless tobacco: Never Used   Substance and Sexual Activity     Alcohol use: Yes     Comment: 2-4 drinks per month     Drug use: No     Sexual activity: Yes     Partners: Male     Birth control/protection: Male Surgical     Comment: vasectomy   Lifestyle     Physical activity     Days per week: None     Minutes per session: None     Stress: None   Relationships     Social connections     Talks on phone: None     Gets together: None     Attends Islam service: None     Active member of club or organization: None     Attends meetings of clubs or organizations: None     Relationship status: None     Intimate partner violence     Fear of current or ex partner: None     Emotionally abused: None     Physically abused: None     Forced sexual activity: None   Other Topics Concern     Parent/sibling w/ CABG, MI or angioplasty before 65F 55M? Not Asked      Service No     Blood Transfusions No     Caffeine Concern No     Comment: magdy tea in am     Occupational Exposure No     Hobby Hazards No     Sleep Concern No     Stress Concern No     Weight Concern No     Special Diet No     Back Care No     Exercise No     Bike Helmet No     Seat Belt No     Self-Exams No   Social History Narrative    How much exercise per week? Walks 5 days a weekHow much calcium per day? Eats a lot of dairy products daily. 3-4 times   How much caffeine per day? 1 cup a dayHow much vitamin D per day? DietDo you/your family wear seatbelts?  YesDo you/your family use safety helmets? YesDo you/your family use sunscreen? YesDo you/your family keep firearms in the home? NoDo you/your family have a smoke detector(s)? YesDo  "you feel safe in your home? YesHas anyone ever touched you in an unwanted manner? No    Renetta Shaikh, [facility]    10/05/2015        Reviewed [facility] 11-5-20       ROS   ROS: 10 point ROS neg other than the symptoms noted above in the HPI.-    PHQ-9 SCORE 10/6/2016 8/17/2018 11/1/2019   PHQ-9 Total Score - - -   PHQ-9 Total Score 8 4 4     NIKA-7 SCORE 10/29/2018 11/1/2019 11/4/2020   Total Score 3 (minimal anxiety) - 7 (mild anxiety)   Total Score 3 4 7         EXAM:  Blood pressure 120/79, pulse 82, height 1.6 m (5' 2.99\"), weight 67.5 kg (148 lb 14.4 oz), last menstrual period 10/18/2020, not currently breastfeeding. Body mass index is 26.38 kg/m .  General - pleasant female in no acute distress.  Skin - no suspicious rashes.   +3 non pustule ~1mm skin tags on left upper thigh. NT, no redness, no drainage.  See media for picutre taken with patient consent.   EENT-  PERRLA, euthyroid with out palpable nodules  Neck - supple without lymphadenopathy.  Lungs - clear to auscultation bilaterally.  Heart - regular rate and rhythm without murmur.  Abdomen - soft, nontender, nondistended, no masses or organomegaly noted.  Musculoskeletal - no gross deformities.  Neurological - normal strength, sensation, and mental status.    Breast Exam:  Breast: Without visible skin changes. No dimpling or lesions seen.   Breasts supple, non-tender with palpation, no dominant mass, nodularity, or nipple discharge noted bilaterally. Axillary nodes negative.      Pelvic Exam:  EG/BUS: Pubic hair waxed Normal genital architecture without lesions or abnormal secretions. [place] erythema noted on vulva and at introitus c/w wax yesterday.  [place]'s, Urethra, [place]'s normal   Urethral meatus: normal   Urethra: no masses, tenderness, or scarring   Bladder: no masses or tenderness   Vagina: moist, pink, rugae with creamy, white and odorless  secretions  Cervix: no lesions and pink, moist, closed, without lesion or CMT  Uterus: anteverted,  and " small, smooth, firm, mobile w/o pain  Adnexa: Within normal limits and No masses, nodularity, tenderness  Rectum:anus normal     Next Pap with HPV co testing due 2024    ASSESSMENT:  Encounter Diagnoses   Name Primary?     Visit for preventive health examination Yes     Snoring      Feeling tired      Premenstrual dysphoric disorder      Anxiety and depression         PLAN:   Orders Placed This Encounter   Procedures     SLEEP EVALUATION & MANAGEMENT REFERRAL - ADULT -[facility] [place] [phone] (Age 15 and up)     MENTAL HEALTH REFERRAL  - Adult; Outpatient Treatment; Individual/Couples/Family/Group Therapy/Health Psychology; Other: [facility] [phone]; We will contact you to schedule the appointment or please call with any questions         Additional teaching done at this visit regarding self breast exam, exercise, PMS, perimenopause and mental health.    - Referral to Dr. Alcazar for discussion of current medication regimen for anxiety/depression and PMS vs PMDD.    - Referral to Dr. Esteban for talk therapy for anxiety/depression.    - Recommend additional modalities including acupuncture, reiki, massage with COVID precautions.   - Recommend follow up with dermatology for mole check and removal of skin tags on thigh  - Referral to sleep center for work up on possible sleep apnea  - Reinforced follow up before next annual exam prn   - Return to clinic in one year and prn     JORDAN Sarabia CNM      Answers for HPI/ROS submitted by the patient on 11/4/2020   NIKA 7 TOTAL SCORE: 7

## 2020-11-05 NOTE — PATIENT INSTRUCTIONS

## 2020-11-05 NOTE — NURSING NOTE
Annual exam   Faxed Rx for the Tramadol to [facility], [phone], right fax confirmed at 2:09 pm today.  Yuli Giang MA/  For [employer]

## 2020-11-06 PROBLEM — F41.9 ANXIETY AND DEPRESSION: Status: ACTIVE | Noted: 2020-11-06

## 2020-11-06 PROBLEM — F32.81 PREMENSTRUAL DYSPHORIC DISORDER: Status: ACTIVE | Noted: 2020-11-06

## 2020-11-06 PROBLEM — F32.A ANXIETY AND DEPRESSION: Status: ACTIVE | Noted: 2020-11-06

## 2020-11-22 ENCOUNTER — HEALTH MAINTENANCE LETTER (OUTPATIENT)
Age: 40
End: 2020-11-22

## 2020-11-24 ENCOUNTER — HOSPITAL ENCOUNTER (OUTPATIENT)
Dept: LAB | Facility: CLINIC | Age: 40
Discharge: HOME | End: 2020-11-24
Attending: INTERNAL MEDICINE | Admitting: INTERNAL MEDICINE
Payer: COMMERCIAL

## 2020-11-24 DIAGNOSIS — D68.52 PROTHROMBIN GENE MUTATION (H): ICD-10-CM

## 2020-11-24 DIAGNOSIS — Z79.01 LONG TERM CURRENT USE OF ANTICOAGULANT THERAPY: ICD-10-CM

## 2020-11-24 LAB — INR PPP: 2.92 (ref 0.86–1.14)

## 2020-11-24 PROCEDURE — 36415 COLL VENOUS BLD VENIPUNCTURE: CPT | Performed by: INTERNAL MEDICINE

## 2020-11-24 PROCEDURE — 85610 PROTHROMBIN TIME: CPT | Performed by: INTERNAL MEDICINE

## 2020-11-25 ENCOUNTER — ANTICOAGULATION THERAPY VISIT (OUTPATIENT)
Dept: ANTICOAGULATION | Facility: CLINIC | Age: 40
End: 2020-11-25

## 2020-11-25 DIAGNOSIS — D68.52 PROTHROMBIN GENE MUTATION (H): ICD-10-CM

## 2020-11-25 DIAGNOSIS — Z79.01 LONG TERM CURRENT USE OF ANTICOAGULANT THERAPY: ICD-10-CM

## 2020-11-25 NOTE — PROGRESS NOTES
ANTICOAGULATION FOLLOW-UP CLINIC VISIT    Patient Name:  Stacey Maki  Date:  2020  Contact Type:  Telephone    SUBJECTIVE:         OBJECTIVE    Recent labs: (last 7 days)     20  1620   INR 2.92*       ASSESSMENT / PLAN  INR assessment THER    Recheck INR In: 6 WEEKS    INR Location Clinic      Anticoagulation Summary  As of 2020    INR goal:  2.0-3.0   TTR:  75.3 % (8.8 mo)   INR used for dosin.92 (2020)   Warfarin maintenance plan:  6 mg (2 mg x 3) every Tue; 4 mg (2 mg x 2) all other days   Full warfarin instructions:  6 mg every Tue; 4 mg all other days   Weekly warfarin total:  30 mg   No change documented:  Praveena Blackwood RN   Plan last modified:  Karina Khoury RN (2020)   Next INR check:  2021   Priority:  Maintenance   Target end date:  Indefinite    Indications    Long term current use of anticoagulant therapy [Z79.01]  Prothrombin gene mutation (H) [D68.52]             Anticoagulation Episode Summary     INR check location:      Preferred lab:      Send INR reminders to:   [facility]    Comments:  Patient has 2 mg Tablets Patient contact number [phone]  HIPAA INFO OK to leave messages on home or cell phone leave msg on cell [phone]  Goes to [facility] for labs/results do not come to in-basket +++SEND POOL MESSAG    OK to speak byron Potts      Anticoagulation Care Providers     Provider Role Specialty Phone number    Danny Clark MD Referring Hematology [phone]            See the Encounter Report to view Anticoagulation Flowsheet and Dosing Calendar (Go to Encounters tab in chart review, and find the Anticoagulation Therapy Visit)    Left message for patient with results and dosing recommendations. Asked patient to call back to report any missed doses, falls, signs and symptoms of bleeding or clotting, any changes in health, medication, or diet. Asked patient to call back with any questions or concerns.     Praveena Blackwood,  RN

## 2020-12-01 ENCOUNTER — VIRTUAL VISIT (OUTPATIENT)
Dept: PHARMACY | Facility: CLINIC | Age: 40
End: 2020-12-01
Payer: COMMERCIAL

## 2020-12-01 DIAGNOSIS — I10 BENIGN ESSENTIAL HYPERTENSION: ICD-10-CM

## 2020-12-01 DIAGNOSIS — F32.81 PREMENSTRUAL DYSPHORIC DISORDER: Primary | ICD-10-CM

## 2020-12-01 DIAGNOSIS — D68.52 PROTHROMBIN GENE MUTATION (H): ICD-10-CM

## 2020-12-01 PROCEDURE — 99607 MTMS BY PHARM ADDL 15 MIN: CPT | Performed by: PHARMACIST

## 2020-12-01 PROCEDURE — 99605 MTMS BY PHARM NP 15 MIN: CPT | Performed by: PHARMACIST

## 2020-12-01 RX ORDER — ACETAMINOPHEN AND CODEINE PHOSPHATE 120; 12 MG/5ML; MG/5ML
0.35 SOLUTION ORAL DAILY
Qty: 84 TABLET | Refills: 4 | Status: SHIPPED | OUTPATIENT
Start: 2020-12-01 | End: 2021-02-04

## 2020-12-01 NOTE — PROGRESS NOTES
MTM ENCOUNTER  SUBJECTIVE/OBJECTIVE:                           Stacey Maki is a 40 year old female called for an initial visit. She was referred to me from Emilia Jewell CNM.      Reason for visit: PMDD.    Allergies/ADRs: Reviewed in chart  Tobacco: She reports that she has never smoked. She has never used smokeless tobacco.  Alcohol: Less than 1 beverages / week  Past Medical History: Reviewed in chart      Medication Adherence/Access: no issues reported    PMDD/anxiety:   Currently on escitalopram 20 mg daily,  was doubling up on escitalopram to 40 mg daily in the week prior to her menses.  She had tried fluoxetine in the past, starting with cyclic dosing of 2 weeks on, 2 weeks off, which then changed to daily dosing.  She was switched to escitalopram when her anxiety worsened.  She describes that she has parenting anxiety that is worsened with current situation of distance learning for her kids during the pandemic.  She states the escitalopram seems to help the depression (keeps her from wanting to be in bed all day), but not the anxiety as much.  Describes that her anxiety is worse in the week prior to menses.   Overall, she describes that the depression was new for her as an adult (~ age 36) and anxiety is currently getting worse.  Has had some conversations about progesterone IUD to help with heavy bleeding, but not for mood.   Will be establishing care with psychologist, Dr. Esteban, tomorrow.  Of note, her  has had a vasectomy, so contraception is not a concern for her.     Clotting disorder (heterozygous prothrombin gene mutation):   Currently on warfarin 4 mg 6 days/week and 6 mg 1 day/week;   Is followed by anticoagulation team.   She reports that she had a post-partum cerebral clot after her first pregnancy;  She was on warfarin then switched to enoxaparin during preconception planning;   This was eventually stopped as her clot was determined to be low risk.  After stopping enoxaparin she did  have 2 cerebral clots in pregnancy, and is now anticipating needing lifelong anticoagulation.  She notes that she did take 10 years of MILES prior to her first pregnancy without any problems prior to having her clotting disorder diagnosed.       Hypertension: Current medications include losartan 25 mg once daily.    BP Readings from Last 3 Encounters:   11/05/20 120/79   07/24/20 103/75   12/17/19 116/78       Today's Vitals: There were no vitals taken for this visit.      ASSESSMENT:                              Medication Adherence: No issues identified    PMDD/Anxiety: Patient is not reaching her goals of having manageable depression/anxiety.  We did discuss the possibility of changing to a different selective serotonin reuptake inhibitor, but she may benefit most from stabilizing hormonal fluctuations.  She cannot take estrogen containing contraceptives due to her clotting disorder, but a progesterone only pill may provide some hormonal stability to help prevent some of the PMDD/Anxiety symptoms.  She will also benefit from psychology care for additional support.  Clotting disorder: currently stable and appropriately followed by anticoag team.  Hypertension: Stable. Patient is meeting blood pressure goal of < 130/80mmHg.      PLAN:                            1.  Continue escitalopram 20 mg once daily.  This is the maximum recommended dose, so do not increase to 40 mg daily.  2.  Start norethindrone 0.35 mg, take 1 tablet daily.  This is a progesterone only pill, which should help to stabilize your hormones and help to prevent the premenstrual symptoms.  You may experience some breakthrough and irregular bleeding as your body adjusts to this hormone.  However, over time, you may stop having any menstrual bleeding.      I spent 45 minutes with this patient today. All changes were made via collaborative practice agreement with Emilia Jewell CNM. A copy of the visit note was provided to the patient's referring  provider.    Follow-up with Emilia Jewell CNM in 2-3 months;  Follow-up with MTM as needed.    The patient was sent via [employer] a summary of these recommendations.     Donya Alcazar, Pharm.D., [facility]      Patient consented to a telehealth visit: yes  Telemedicine Visit Details  Type of service:  Video visit  Start Time: 10:00  End Time: 10:45  Originating Location (patient location): Home  Distant Location (provider location):  [facility] MTM  Mode of Communication:  Video Conference via Well

## 2020-12-01 NOTE — PATIENT INSTRUCTIONS
Recommendations from today's MTM visit:                                                    MTM (medication therapy management) is a service provided by a clinical pharmacist designed to help you get the most of out of your medicines.       1.  Continue escitalopram 20 mg once daily.  This is the maximum recommended dose, so do not increase to 40 mg daily.  2.  Start norethindrone 0.35 mg, take 1 tablet daily.  This is a progesterone only pill, which should help to stabilize your hormones and help to prevent the premenstrual symptoms.  You may experience some breakthrough and irregular bleeding as your body adjusts to this hormone.  However, over time, you may stop having any menstrual bleeding.        It was great to speak with you today.  I value your experience and would be very thankful for your time with providing feedback on our clinic survey. You may receive a survey via email or text message in the next few days.     Next MTM visit: as need with me;   Follow-up with Emilia Jewell CNM, in 2-3 months.    To schedule another MTM appointment, please call the clinic directly or you may call the MTM scheduling line at [phone] or toll-free at [phone].     My Clinical Pharmacist's contact information:                                                      It was a pleasure talking with you today!  Please feel free to contact me with any questions or concerns you have.      Donya Alcazar, Pharm.D., [facility] [place]  Phone:  [phone]

## 2020-12-02 ENCOUNTER — VIRTUAL VISIT (OUTPATIENT)
Dept: PSYCHOLOGY | Facility: CLINIC | Age: 40
End: 2020-12-02
Payer: COMMERCIAL

## 2020-12-02 DIAGNOSIS — F41.9 ANXIETY DISORDER, UNSPECIFIED TYPE: ICD-10-CM

## 2020-12-02 DIAGNOSIS — F33.1 MODERATE EPISODE OF RECURRENT MAJOR DEPRESSIVE DISORDER (H): Primary | ICD-10-CM

## 2020-12-02 PROCEDURE — 90791 PSYCH DIAGNOSTIC EVALUATION: CPT | Mod: 95

## 2020-12-02 NOTE — PROGRESS NOTES
"  Name:  Stacey Maki  Mrn: [id number]  Date of visit: 12/2/2020    PSYCHOLOGY OUTPATIENT VISIT NOTE     The patient has been notified of the following:      \"We have found that certain health care needs can be provided without the need for a face to face visit.  This service lets us provide the care you need with a phone conversation.       I will have full access to your [place] medical record during this entire phone call.   I will be taking notes for your medical record.      Since this is like an office visit, we will bill your insurance company for this service.       ?  Confidentiality still applies for telephone services, and nobody will record the visit.  It is important to be in a quiet, private space that is free of distractions  during the visit.??      Consent has been obtained for this service by care team member: Yes   Telehealth visit appropriate    Referral Source: Emilia Jewell CNM, [facility]    History of PRESENTING PROBLEMS/SYMPTOMS:  Around 2016 noticed impatience and irritability increasing with kids, \"going 0 to 10,\" atypical for her.  Increasing episodes of crying around menstrual cycle, thinking no one likes me, Dr Hernandez prescribed fluoxetine, end of 2017: increased patience, medication beneficial.  In 2018 blood pressure increasing and anxiety: PCP, Dr Barrett, switched fluoxetine to lexapro and Ms Maki decided to take extra dose around menstrual cycle.  In 2020 Dr Lange increased dosage to 20mg.  Then patient increased dosage on her own to 40mg around menstrual cycle, \"it helps with my patience, I don't yell, I'm more chill.\"  Visit 12/1/20 with Dr Alcazar, [facility] [place]consult: around appropriate dosage and to consider hormone therapy.  Also plan to pursue psychotherapy.  Recognizing depression as more systemic but \"I can't tell if anxiety is in me like depression is; or anxiety is outside of me?\"  Goal to decrease irritability, increase patience, " "improve mood.           Social History  Born and raised in [place] [facility].  Parents  at 2yo.  Paternal half sister with his 3rd wife. Dad currently  to man.  Mom is  to 3rd  (30yrs), \"he's my dad in my heart of hearts\".  I turned into mom's therapist.  Cared for by maternal grandparents when mom working.    Education: Masters in School Counseling  Work:   at [employer], \"love it, don't have work stress.\"  : 2004, boys = 11yo and 13yo.  One Son has difficulty with following directions, has appointment scheduled for son ADHD evaluation and he also has \"severe anxiety.\"   Living in [place] in own home.    Mental Health History:  Saw a therapist around parent's divorce (elementary school).  One visit in 2006 when diagnosed with brain blood clot: (had to take care of mom's emotions when diagnosed with brain clots) feedback  From therapist that she was being \"emotionally blackmailed\" and was too startling.  For meds history see HPP above.      Family Mental Health - Mom has had depression and anxiety since childhood.  Dad has always been a drug and alcohol user, marijuana daily, \"always needs a high.\"  Dad's family \"partiers\" all alcoholic.  Maternal grandma is narcissist and \"a closet alcoholic, family secret, no one has ever seen her drink.\"    Medical History:  Blood clot in brain, first postpartum period; clotting disorder is Prothrombin, familial genetic mutation.  Now taking Warfarin.      Health Behaviors:  Time did not permit for review    Psychological Symptoms  Depression - Yes, absolutely.  Crying  Anhedonia - enjoyment dampened by mood and anxiety (big change)  Eating - overeating, craving sugar.  Had lost 20lbs but recently gained 10lbs.   Sleep - Insomnia, middle this week.  Prior weeks sleeping a lot and not feeling rested.    Energy -  Low  Anxiety - high  SI - denied  HI - denied    ASSESSMENT: Engaged and interested in visit  Mental Status " "Assessment:  Appearance:   unknown  Eye Contact:   n/a  Psychomotor Behavior: unknown  Attitude:   Cooperative   Orientation:   All  Speech   Rate / Production: Normal    Volume:  Normal   Mood:    Normal  Thought Content:  Clear   Thought Form:  Coherent  Logical   Insight:    Fair to good    DIAGNOSIS:   Axis I:  MDD recurrent, moderate;  Unspecified anxiety disorder  Axis II: deferred  Axis III: Prothrombin gene mutation  Axis IV: None  Axis V: GAF current = 51-60    Summary and Impressions:  Stacey Maki is  39yo  woman experiencing anxiety and depressed mood.  Her heightened irritability and impatience is a primary concern for her and she linked exacerbation of symptoms to her menstrual cycle.  She has been taking psychiatric medications for anxiety and depression since 2017 and has self-adjusted dosage around her menstrual cycle.  She recently received educaiton from Dr Alcazar around adjusting her dosage and has a plan to consider hormone therapy in addition to psychiatric medication with her prescribing provider.    History is notable for parental divorce in early childhood, pervasive mental health disorders on maternal and paternal sides of family and including substance use disorders.  Ms Maki identified herself providing \"stability\" for her family of origin from a young age.    Psychotherapy is recommended and feedback provided to patient.  She is in agreement with this plan and will also follow-up with her prescribing providers.  PLAN:    Patient to schedule followup visit with this provider.  Patient to follow-up with prescribing provider regarding meds management.      Total time spent equals 55 minutes, psychological assessment. Telephone  3:05-4:00             "

## 2020-12-07 ENCOUNTER — TELEPHONE (OUTPATIENT)
Dept: DERMATOLOGY | Facility: CLINIC | Age: 40
End: 2020-12-07

## 2020-12-07 NOTE — TELEPHONE ENCOUNTER
General Call:     Who is calling:  Stacey    Reason for Call:  Stacey is calling to make an appointment at the [facility] to look at a few moles. She also scraped off a mole this morning in the shower and she wants it looked at. She is hoping to possibly get in today. She would like a call back.    Okay to leave a detailed message:Yes at Cell number on file:    Telephone Information:   Mobile [phone]

## 2020-12-08 NOTE — TELEPHONE ENCOUNTER
Called and spoke to patient.    (New) patient reports scraping a possible skin tag off inner thigh, part of the skin tag is still intact. Patient reports having 2 other similar lesions in the same area.    Advised patient to apply vaseline/band-aid daily until appointment.    Scheduled next available appointment for further evaluation.    Patient voiced understanding.    MARCOS Robbins-BSN-N  [facility]  [phone]

## 2020-12-14 ENCOUNTER — HOSPITAL ENCOUNTER (OUTPATIENT)
Dept: MAMMOGRAPHY | Facility: CLINIC | Age: 40
Discharge: HOME | End: 2020-12-14
Attending: ADVANCED PRACTICE MIDWIFE | Admitting: ADVANCED PRACTICE MIDWIFE
Payer: COMMERCIAL

## 2020-12-14 DIAGNOSIS — Z12.31 VISIT FOR SCREENING MAMMOGRAM: ICD-10-CM

## 2020-12-14 PROCEDURE — 77063 BREAST TOMOSYNTHESIS BI: CPT

## 2020-12-15 ENCOUNTER — MYC MEDICAL ADVICE (OUTPATIENT)
Dept: DERMATOLOGY | Facility: CLINIC | Age: 40
End: 2020-12-15

## 2020-12-15 ENCOUNTER — TELEPHONE (OUTPATIENT)
Dept: DERMATOLOGY | Facility: CLINIC | Age: 40
End: 2020-12-15

## 2020-12-15 NOTE — TELEPHONE ENCOUNTER
[employer] [facility] [employer] message sent:    Shamir Ibarra-    Unfortunately, we have to cancel your Dermatology appointment on 12/17/20 @1:30pm. Dr. Rangel will not be in the office that afternoon.    Please call the number below to reschedule.    Sorry for the inconvenience,    Itzel RN-BSN-PHN  [facility]  [phone]

## 2020-12-15 NOTE — TELEPHONE ENCOUNTER
[employer] message sent:    Shamir Ibarra-    Does 1/14/21 @1:00pm work?    I have you down for that date, unless I hear back from you.    Thanks,    MARCOS Robbins-BSN-PHN  [facility]  [phone]

## 2020-12-30 ENCOUNTER — NURSE TRIAGE (OUTPATIENT)
Dept: NURSING | Facility: CLINIC | Age: 40
End: 2020-12-30

## 2020-12-30 ENCOUNTER — OFFICE VISIT (OUTPATIENT)
Dept: FAMILY MEDICINE | Facility: CLINIC | Age: 40
End: 2020-12-30
Payer: COMMERCIAL

## 2020-12-30 VITALS
TEMPERATURE: 98.4 F | RESPIRATION RATE: 12 BRPM | HEIGHT: 63 IN | OXYGEN SATURATION: 98 % | WEIGHT: 152 LBS | BODY MASS INDEX: 26.93 KG/M2 | DIASTOLIC BLOOD PRESSURE: 60 MMHG | SYSTOLIC BLOOD PRESSURE: 110 MMHG | HEART RATE: 88 BPM

## 2020-12-30 DIAGNOSIS — L03.116 CELLULITIS OF LEFT LOWER EXTREMITY: Primary | ICD-10-CM

## 2020-12-30 DIAGNOSIS — B08.1 MOLLUSCUM CONTAGIOSUM: ICD-10-CM

## 2020-12-30 PROCEDURE — 99213 OFFICE O/P EST LOW 20 MIN: CPT | Performed by: FAMILY MEDICINE

## 2020-12-30 RX ORDER — CEPHALEXIN 500 MG/1
500 CAPSULE ORAL 3 TIMES DAILY
Qty: 21 CAPSULE | Refills: 0 | Status: SHIPPED | OUTPATIENT
Start: 2020-12-30 | End: 2021-01-06

## 2020-12-30 ASSESSMENT — MIFFLIN-ST. JEOR: SCORE: 1310.66

## 2020-12-30 ASSESSMENT — PATIENT HEALTH QUESTIONNAIRE - PHQ9: SUM OF ALL RESPONSES TO PHQ QUESTIONS 1-9: 8

## 2020-12-30 NOTE — PROGRESS NOTES
Subjective     Stacey Maki is a 40 year old female who presents to clinic today for the following health issues:    HPI         Concern - Mole Check  Onset: problems since November  Description:  left inner thigh, groin area is tender  Intensity: moderate  Progression of Symptoms:  same  Accompanying Signs & Symptoms: 3 lesions in upper lt thigh- one is filled with blood and is getting bigger  Previous history of similar problem: history of angiograms  Precipitating factors:        Worsened by:   Alleviating factors:        Improved by: na  Therapies tried and outcome:  none     Future Appointments   Date Time Provider Department Center   12/30/2020  2:25 PM Natanael Flores DO [facility]   1/4/2021 12:00 PM Nora Esteban, PhD LP [facility]   1/14/2021  1:00 PM Shahzad Rangel MD [facility]   1/15/2021 11:00 AM Nora Esteban, PhD LP [facility]   1/25/2021  2:00 PM Nora Esteban, PhD [facility]     Appointment Notes for this encounter:   Per nurse: possible infected mole on left inner thigh    Health Maintenance Due   Topic Date Due     ANNUAL REVIEW OF HM ORDERS  1980     DEPRESSION ACTION PLAN  1978     HEPATITIS C SCREENING  04/03/1996     INFLUENZA VACCINE (1) 09/01/2020     Health Maintenance addressed:  Immunizations and PHQ9    PHQ9 / NIKA / ACT Gave pt questionnaire to complete and Immunizations Pt declined    MyChart Status:  Active and Using      Review of Systems   Patient is seen for concern of possible infected skin lesion.    Patient rubbed up along the edge of an outside table when skiing 2 days ago with her children. She noted pain in area of mole yesterday with redness of the skin surrounding the mole, and left groin area pain.     She has a dermatology appointment to have 3 mole like areas evaluated on her left proximal thigh.  The one in the center was scraped off by her before her recent skin injury while skiing.  This area seems to  "have healed up well.  All of the more like areas look similar originally.  I looked at the picture that she taken on a previous visit.  All 3 looked similar to a possible molluscum contagiosum condition.  The ones she is concerned about at time of exam did not turn dark in color until she was sitting on the outside table while skiing.        Objective    /60 (Cuff Size: Adult Regular)   Pulse 88   Temp 98.4  F (36.9  C) (Tympanic)   Resp 12   Ht 1.588 m (5' 2.5\")   Wt 68.9 kg (152 lb)   SpO2 98%   BMI 27.36 kg/m    Body mass index is 27.36 kg/m .  Physical Exam   Vital signs reviewed.  Patient is in no acute appearing distress.  Breathing appears nonlabored.  Patient is alert and oriented ×3.  Patient is very pleasant, making good eye contact and responding with clear fluent speech.    Patient and me discussed the idea of me excising the dark mole like area by applying gentle traction on this area, and excising it with a scalpel blade to submit for evaluation.  She was agreeable to this plan.  When I attempt to do this procedure, the dark area of mole was very friable and came off from adjacent skin with very little traction applied by me.  I feel the dark area was only a dried scab not suitable for submission to pathology.  See pictures for reference.  Patient still plans on following up with dermatology.  She has obvious cellulitis in an area approximately 8 cm diameter on her left proximal thigh with erythema and mild edema here.  She is tender in her left inguinal area, I suspect from inflamed lymph nodes associated with the cellulitis.                          Assessment & Plan     Cellulitis of left lower extremity    - cephALEXin (KEFLEX) 500 MG capsule  Dispense: 21 capsule; Refill: 0    Molluscum contagiosum  Patient plans on following up with dermatology for further management.           Patient Instructions   Consult with warfarin dosing nurse about checking your INR while taking your " cephalexin.     Patient Education     Discharge Instructions for Cellulitis   You have been diagnosed with cellulitis. This is an infection in the deepest layer of the skin and tissue beneath the skin. In some cases, the infection also affects the muscle. Cellulitis is caused by bacteria. The bacteria can enter the body through broken skin. This can happen with a cut, scratch, animal bite, or an insect bite that has been scratched. You may have been treated in the hospital with antibiotics and fluids. You will likely be given a prescription for antibiotics to take at home. This sheet will help you take care of yourself at home.  Home care  When you are home:    Take the prescribed antibiotic medicine you are given as directed until it is gone. Take it even if you feel better. It treats the infection and stops it from returning. Not taking all the medicine can make future infections hard to treat.    Keep the infected area clean.    When possible, raise the infected area above the level of your heart. This helps keep swelling down.    Talk with your healthcare provider if you are in pain. Ask what kind of over-the-counter medicine you can take for pain.    Apply clean bandages as advised.    Take your temperature once a day for a week.    Wash your hands often to prevent spreading the infection.  In the future, wash your hands before and after you touch cuts, scratches, or bandages. This will help prevent infection.   When to call your healthcare provider  Call your healthcare provider right away if you have any of the following:    Trouble or pain when moving the joints above or below the infected area    Discharge or pus draining from the area    Fever of 100.4 F (38 C) or higher, or as directed by your healthcare provider    Pain that gets worse in or around the infected     Redness that gets worse in or around the infected area, particularly if the area of redness expands to a wider area    Shaking  chills    Swelling of the infected area    Vomiting  Ranjeet last reviewed this educational content on 11/1/2019 2000-2020 [employer]. [address] [place] [address], [place] [address]. All rights reserved. This information is not intended as a substitute for professional medical care. Always follow your healthcare professional's instructions.           Patient Education     Molluscum Contagiosum (Adult)  Molluscum contagiosum is a common skin infection. It is caused by a pox virus. The infection results in raised, flesh-colored bumps with central indentations on the skin. The bumps are sometimes itchy, but not painful. They may spread or form lines when scratched. Almost any area of skin can be affected. Common sites include the face, neck, armpit, arms, hands, and genitals.    Molluscum contagiosum spreads easily from one part of the body to another. It spreads through scratching or other contact. It can also spread from person to person. This often happens through shared clothing, towels, or objects such as shared sports gear. It can spread during contact sports or sexual contact.  Because it is caused by a virus, antibiotics don't help. The infection usually goes away on its own within a period of 6 to 18 months, but may spread if not treated. The infection may continue in people with a weak immune system. This includes people with diabetes, cancer, or HIV.  If the bumps are bothersome or unsightly, treatment may remove them. This may include scraping, freezing, or by applying an acid, blistering solution, or a cream that modulates the immune system.  Home care  Your healthcare provider can prescribe a medicine to help the bumps heal. Follow the provider s instructions for using these medicines.    The following are general care guidelines:    Don't scratch the rash. Scratching spreads the infection. If needed, cover affected skin with bandages to help prevent scratching.    Wash your hands before and  after caring for the rash.    Don't share towels, washcloths, or clothing with anyone.    Don't shave any areas where the bumps are present.    Don't have sex if the bumps are in the genital area.    If participating in contact sports or other activity that involves skin-to-skin contact, cover all affected skin with clothing or bandages.    Don't swim in public pools until the rash clears.  Follow-up care  Follow up with your healthcare provider, or as advised.  When to seek medical advice  Call your healthcare provider right away if any of these occur:    Fever of 100.4 F (38 C) or higher    Signs of infection, such as warmth, pain, oozing, or redness    Bumps appear on a new area of the body or seem to be spreading rapidly  Ranjeet last reviewed this educational content on 6/1/2018 2000-2020 [employer]. [address] [place] [address]. All rights reserved. This information is not intended as a substitute for professional medical care. Always follow your healthcare professional's instructions.               Return in about 3 days (around 1/2/2021), or if symptoms worsen or fail to improve.    Natanael Flores DO  [facility]

## 2020-12-30 NOTE — TELEPHONE ENCOUNTER
RN triage   Call from pt   Pt states she has an infected mole- on L inner thigh near buttocks cheek --   Has 3 moles - thinks one is infected and looks like a blood blister   No fever - no pus -- no red streaks   Rash area around blood blister   Now groin hurting/ feels like a bruise   Reviewed home care and transferred to      Yvette Zelaya RN  [facility]  Triage Nurse Advisor      COVID 19 Nurse Triage Plan/Patient Instructions    Please be aware that novel coronavirus (COVID-19) may be circulating in the community. If you develop symptoms such as fever, cough, or SOB or if you have concerns about the presence of another infection including coronavirus (COVID-19), please contact your health care provider or visit [email or web address].     Disposition/Instructions    In-Person Visit with provider recommended. Reference Visit Selection Guide.    Thank you for taking steps to prevent the spread of this virus.  o Limit your contact with others.  o Wear a simple mask to cover your cough.  o Wash your hands well and often.    Resources    [facility]: About COVID-19: [email or web address]    CDC: What to Do If You're Sick: www.cdc.gov/coronavirus/2019-ncov/about/steps-when-sick.html    CDC: Ending Home Isolation: www.cdc.gov/coronavirus/2019-ncov/hcp/disposition-in-home-patients.html     CDC: Caring for Someone: www.cdc.gov/coronavirus/2019-ncov/if-you-are-sick/care-for-someone.html     [facility]: Interim Guidance for Hospital Discharge to Home: www.health.[facility].[place]/diseases/coronavirus/hcp/hospdischarge.pdf    [facility] clinical trials (COVID-19 research studies): clinicalaffairs.[facility]-clinical-trials     Below are the COVID-19 hotlines at the [place] Department of [facility] ([facility]). Interpreters are available.   o For health questions: Call 170-521-7737 or 1-862.223.5890 (7 a.m. to 7 p.m.)  o For questions about schools and childcare: Call 232-205-3102 or 1-241.972.2893 (7 a.m. to 7 p.m.)                          Additional Information    Wound infection suspected (i.e., pain, spreading redness, or pus; in a cut, puncture, scrape or sutured wound)    Negative: Bright red, wide-spread, sunburn-like rash    Negative: Black (necrotic) or blisters develop in wound    Negative: Looks infected (spreading redness, red streak, pus) and fever    Negative: Patient sounds very sick or weak to the triager    Negative: Severe pain in the wound    Negative: Red streak runs from the wound    Negative: Facial wound looks infected (spreading redness)    Negative: Finger wound and entire finger swollen    Skin redness around the wound larger than 2 inches (5 cm)    Protocols used: RASH OR REDNESS - [id number], WOUND INFECTION-A-OH

## 2020-12-30 NOTE — PATIENT INSTRUCTIONS
Consult with warfarin dosing nurse about checking your INR while taking your cephalexin.     Patient Education     Discharge Instructions for Cellulitis   You have been diagnosed with cellulitis. This is an infection in the deepest layer of the skin and tissue beneath the skin. In some cases, the infection also affects the muscle. Cellulitis is caused by bacteria. The bacteria can enter the body through broken skin. This can happen with a cut, scratch, animal bite, or an insect bite that has been scratched. You may have been treated in the hospital with antibiotics and fluids. You will likely be given a prescription for antibiotics to take at home. This sheet will help you take care of yourself at home.  Home care  When you are home:    Take the prescribed antibiotic medicine you are given as directed until it is gone. Take it even if you feel better. It treats the infection and stops it from returning. Not taking all the medicine can make future infections hard to treat.    Keep the infected area clean.    When possible, raise the infected area above the level of your heart. This helps keep swelling down.    Talk with your healthcare provider if you are in pain. Ask what kind of over-the-counter medicine you can take for pain.    Apply clean bandages as advised.    Take your temperature once a day for a week.    Wash your hands often to prevent spreading the infection.  In the future, wash your hands before and after you touch cuts, scratches, or bandages. This will help prevent infection.   When to call your healthcare provider  Call your healthcare provider right away if you have any of the following:    Trouble or pain when moving the joints above or below the infected area    Discharge or pus draining from the area    Fever of 100.4 F (38 C) or higher, or as directed by your healthcare provider    Pain that gets worse in or around the infected     Redness that gets worse in or around the infected  area, particularly if the area of redness expands to a wider area    Shaking chills    Swelling of the infected area    Vomiting  FRAMED last reviewed this educational content on 11/1/2019 2000-2020 [employer]. [address] [place] [address], [place] [address]. All rights reserved. This information is not intended as a substitute for professional medical care. Always follow your healthcare professional's instructions.           Patient Education     Molluscum Contagiosum (Adult)  Molluscum contagiosum is a common skin infection. It is caused by a pox virus. The infection results in raised, flesh-colored bumps with central indentations on the skin. The bumps are sometimes itchy, but not painful. They may spread or form lines when scratched. Almost any area of skin can be affected. Common sites include the face, neck, armpit, arms, hands, and genitals.    Molluscum contagiosum spreads easily from one part of the body to another. It spreads through scratching or other contact. It can also spread from person to person. This often happens through shared clothing, towels, or objects such as shared sports gear. It can spread during contact sports or sexual contact.  Because it is caused by a virus, antibiotics don't help. The infection usually goes away on its own within a period of 6 to 18 months, but may spread if not treated. The infection may continue in people with a weak immune system. This includes people with diabetes, cancer, or HIV.  If the bumps are bothersome or unsightly, treatment may remove them. This may include scraping, freezing, or by applying an acid, blistering solution, or a cream that modulates the immune system.  Home care  Your healthcare provider can prescribe a medicine to help the bumps heal. Follow the provider s instructions for using these medicines.    The following are general care guidelines:    Don't scratch the rash. Scratching spreads the infection. If needed, cover affected  skin with bandages to help prevent scratching.    Wash your hands before and after caring for the rash.    Don't share towels, washcloths, or clothing with anyone.    Don't shave any areas where the bumps are present.    Don't have sex if the bumps are in the genital area.    If participating in contact sports or other activity that involves skin-to-skin contact, cover all affected skin with clothing or bandages.    Don't swim in public pools until the rash clears.  Follow-up care  Follow up with your healthcare provider, or as advised.  When to seek medical advice  Call your healthcare provider right away if any of these occur:    Fever of 100.4 F (38 C) or higher    Signs of infection, such as warmth, pain, oozing, or redness    Bumps appear on a new area of the body or seem to be spreading rapidly  Ranjeet last reviewed this educational content on 6/1/2018 2000-2020 [employer]. [address] [place] [address], [place] [address]. All rights reserved. This information is not intended as a substitute for professional medical care. Always follow your healthcare professional's instructions.

## 2021-01-01 NOTE — PROGRESS NOTES
ANTICOAGULATION FOLLOW-UP CLINIC VISIT    Patient Name:  Stacey Maki  Date:  3/28/2019  Contact Type:  Telephone    SUBJECTIVE:     Patient Findings     Comments:   Patient had an appointment with Norman Albert yesterday and patient might switch to Xarelto.            OBJECTIVE    INR   Date Value Ref Range Status   2019 1.79 (H) 0.86 - 1.14 Final       ASSESSMENT / PLAN  No question data found.  Anticoagulation Summary  As of 3/28/2019    INR goal:   2.0-3.0   TTR:   65.5 % (2.8 y)   INR used for dosin.79! (3/27/2019)   Warfarin maintenance plan:   4 mg (2 mg x 2) every day   Full warfarin instructions:   3/28: 6 mg; Otherwise 4 mg every day   Weekly warfarin total:   28 mg   Plan last modified:   Leticia Cho RN (2018)   Next INR check:   2019   Priority:   INR   Target end date:       Indications    Long term current use of anticoagulant therapy [Z79.01]  Prothrombin gene mutation (H) [D68.52]             Anticoagulation Episode Summary     INR check location:       Preferred lab:       Send INR reminders to:   [facility]    Comments:   Patient has 2 mg Tablets Patient contact number [phone]  HIPAA INFO OK to leave messages on home or cell phone leave msg on cell [phone]  Goes to [facility] for labs/results do not come to in-basket +++SEND POOL MESSAG    OK to speak byron Potts      Anticoagulation Care Providers     Provider Role Specialty Phone number    Danny Clark MD Responsible Hematology [phone]            See the Encounter Report to view Anticoagulation Flowsheet and Dosing Calendar (Go to Encounters tab in chart review, and find the Anticoagulation Therapy Visit)    Left message for patient with results and dosing recommendations. Asked patient to call back to report any missed doses, falls, signs and symptoms of bleeding or clotting, any changes in health, medication, or diet. Asked patient to call back with any questions or concerns.      Leticia Cho, RN                  2021

## 2021-01-04 ENCOUNTER — VIRTUAL VISIT (OUTPATIENT)
Dept: PSYCHOLOGY | Facility: CLINIC | Age: 41
End: 2021-01-04
Payer: COMMERCIAL

## 2021-01-04 DIAGNOSIS — F33.0 MILD EPISODE OF RECURRENT MAJOR DEPRESSIVE DISORDER (H): Primary | ICD-10-CM

## 2021-01-04 DIAGNOSIS — F41.9 ANXIETY DISORDER, UNSPECIFIED TYPE: ICD-10-CM

## 2021-01-04 PROCEDURE — 90837 PSYTX W PT 60 MINUTES: CPT | Mod: TEL

## 2021-01-04 NOTE — PROGRESS NOTES
"  Name:  Stacey Maki  Mrn: [id number]  Date of visit: 1/4/2021    PSYCHOLOGY OUTPATIENT VISIT NOTE       The patient has been notified of the following:      \"We have found that certain health care needs can be provided without the need for a face to face visit.  This service lets us provide the care you need with a phone conversation.       I will have full access to your [facility] medical record during this entire phone call.   I will be taking notes for your medical record.      Since this is like an office visit, we will bill your insurance company for this service.       There are potential benefits and risks of telephone visits (e.g. limits to patient confidentiality) that differ from in-person visits.?  Confidentiality still applies for telephone services, and nobody will record the visit.  It is important to be in a quiet, private space that is free of distractions (including cell phone or other devices) during the visit.??      If during the course of the call I believe a telephone visit is not appropriate, you will not be charged for this service\"     Consent has been obtained for this service by care team member: Yes   The author of this note documented a reason for not sharing it with the patient.    PRESENTING PROBLEMS/SYMPTOMS:  Mood symptoms with menstrual cycle (sad, crying).  Anxiety and irritability daily.      INTERVENTION AND RESPONSE:  Cognitive Behavioral therapy, individual.  This is patient's first psychotherapy visit following the initial psychological assessment.  Patient presented with sense of PMS, \"izaguirre\" and sense of ovulating, not sure if progestin will help with mood.  Continuing lexapro at 20mg/day, not adjusting dosage, consistent as prescribed.  Feeling better with holiday, enjoy giving.  Luis can be a down period.  Family vacation, skiing, don't enjoy.  Difficulty coping with younger son's attentional problems, encouraged ADHD assessment by psychologist.  Sleeping to \"escape\".  " "Vacillating: discipline with food vs freedom.  H is always disciplined, differences: drill fili vs let things slide.  He likes to ski and wants me to like it also.  Failed being \"small and fit\" and as a parent.  Discussed and plan to include in tx goals.     Worked on tx plan, see EMR.    Plan: complete tx plan  ASSESSMENT: identifying pattern of seeing self as not measuring up to/for .  Appears to have chronic sadness but reporting sadness only around cycle.    Mom has had depression and anxiety since childhood.  Dad has always been a drug and alcohol user, marijuana daily, \"always needs a high.\"  Dad's family \"partiers\" all alcoholic.  Maternal grandma is narcissist and \"a closet alcoholic, family secret, no one has ever seen her drink.\"     Medical History:  Blood clot in brain, first postpartum period; clotting disorder is Prothrombin, familial genetic mutation.  Now taking Warfarin.    Mental Status Assessment:  Appearance:   unknown  Eye Contact:   n/a  Psychomotor Behavior: unknown  Attitude:   Cooperative   Orientation:   All  Speech   Rate / Production: Normal/ Responsive Normal    Volume:  Normal   Mood:    Anxious, sad  Thought Content:  Clear   Thought Form:  Coherent  Logical   Insight:    Fair     DIAGNOSIS:  MDD recurrent, mild; Unspecified Anxiety disorder;     PLAN:    Patient to schedule followup visit.       Total time spent equals 58 minutes, individual psychotherapy.  Telephone  12:02-1:00             "

## 2021-01-11 ENCOUNTER — ANTICOAGULATION THERAPY VISIT (OUTPATIENT)
Dept: ANTICOAGULATION | Facility: CLINIC | Age: 41
End: 2021-01-11

## 2021-01-11 ENCOUNTER — HOSPITAL ENCOUNTER (OUTPATIENT)
Dept: LAB | Facility: CLINIC | Age: 41
Discharge: HOME | End: 2021-01-11
Attending: INTERNAL MEDICINE | Admitting: INTERNAL MEDICINE
Payer: COMMERCIAL

## 2021-01-11 DIAGNOSIS — D68.52 PROTHROMBIN GENE MUTATION (H): ICD-10-CM

## 2021-01-11 DIAGNOSIS — Z86.718 HISTORY OF CEREBRAL VENOUS SINUS THROMBOSIS: ICD-10-CM

## 2021-01-11 DIAGNOSIS — Z79.01 LONG TERM CURRENT USE OF ANTICOAGULANT THERAPY: ICD-10-CM

## 2021-01-11 LAB — INR PPP: 2.25 (ref 0.86–1.14)

## 2021-01-11 PROCEDURE — 85610 PROTHROMBIN TIME: CPT | Performed by: INTERNAL MEDICINE

## 2021-01-11 PROCEDURE — 36415 COLL VENOUS BLD VENIPUNCTURE: CPT | Performed by: INTERNAL MEDICINE

## 2021-01-11 NOTE — PROGRESS NOTES
ANTICOAGULATION FOLLOW-UP CLINIC VISIT    Patient Name:  Stacey Maki  Date:  2021  Contact Type:  Telephone    SUBJECTIVE:         OBJECTIVE    Recent labs: (last 7 days)     21  1546   INR 2.25*       ASSESSMENT / PLAN  No question data found.  Anticoagulation Summary  As of 2021    INR goal:  2.0-3.0   TTR:  87.5 % (8.9 mo)   INR used for dosin.25 (2021)   Warfarin maintenance plan:  6 mg (2 mg x 3) every Tue; 4 mg (2 mg x 2) all other days   Full warfarin instructions:  6 mg every Tue; 4 mg all other days   Weekly warfarin total:  30 mg   No change documented:  Leticia Cho RN   Plan last modified:  Karina Khoury RN (2020)   Next INR check:  2021   Priority:  Maintenance   Target end date:  Indefinite    Indications    Long term current use of anticoagulant therapy [Z79.01]  Prothrombin gene mutation (H) [D68.52]             Anticoagulation Episode Summary     INR check location:      Preferred lab:      Send INR reminders to:  [facility]    Comments:  Patient has 2 mg Tablets Patient contact number [phone]  HIPAA INFO OK to leave messages on home or cell phone leave msg on cell [phone]  Goes to [facility] for labs/results do not come to in-basket +++SEND POOL MESSAG    OK to speak byron Potts      Anticoagulation Care Providers     Provider Role Specialty Phone number    Danny Clark MD Referring Hematology [phone]            See the Encounter Report to view Anticoagulation Flowsheet and Dosing Calendar (Go to Encounters tab in chart review, and find the Anticoagulation Therapy Visit)    Left message for patient with results and dosing recommendations. Asked patient to call back to report any missed doses, falls, signs and symptoms of bleeding or clotting, any changes in health, medication, or diet. Asked patient to call back with any questions or concerns.      Leticia Cho, RN

## 2021-01-14 ENCOUNTER — OFFICE VISIT (OUTPATIENT)
Dept: DERMATOLOGY | Facility: CLINIC | Age: 41
End: 2021-01-14
Payer: COMMERCIAL

## 2021-01-14 VITALS — HEART RATE: 93 BPM | OXYGEN SATURATION: 98 % | SYSTOLIC BLOOD PRESSURE: 124 MMHG | DIASTOLIC BLOOD PRESSURE: 89 MMHG

## 2021-01-14 DIAGNOSIS — L91.8 ST (SKIN TAG): Primary | ICD-10-CM

## 2021-01-14 DIAGNOSIS — D23.9 DERMAL NEVUS: ICD-10-CM

## 2021-01-14 DIAGNOSIS — L81.4 LENTIGO: ICD-10-CM

## 2021-01-14 DIAGNOSIS — D18.01 ANGIOMA OF SKIN: ICD-10-CM

## 2021-01-14 DIAGNOSIS — B08.1 MOLLUSCUM CONTAGIOSUM: ICD-10-CM

## 2021-01-14 DIAGNOSIS — D23.9 DERMATOFIBROMA: ICD-10-CM

## 2021-01-14 DIAGNOSIS — D22.9 NEVUS: ICD-10-CM

## 2021-01-14 PROCEDURE — 11200 RMVL SKIN TAGS UP TO&INC 15: CPT | Performed by: DERMATOLOGY

## 2021-01-14 PROCEDURE — 99203 OFFICE O/P NEW LOW 30 MIN: CPT | Mod: 25 | Performed by: DERMATOLOGY

## 2021-01-14 NOTE — PROGRESS NOTES
Stacey Maki is an extremely pleasant 40 year old year old female patient here today for spot on left shoulder and r medial thigh. .   Patient states this has been present for some time.  Patient reports the following symptoms:  Had a couple spots on thigh only one left, the other got infected.  .  Patient reports the following previous treatments none.Patient reports the following modifying factors none.  Associated symptoms: none.  Patient has no other skin complaints today.  Remainder of the HPI, Meds, PMH, Allergies, FH, and SH was reviewed in chart.      Past Medical History:   Diagnosis Date     Arteriovenous malformation      Asthma      Chronic cerebral venous sinus thrombosis     diagnosed in , 2009 5wk preg.      Heart disease     mother     Hypertension 2016    mother     Idiopathic intracranial hypertension      Papilledema      Prothrombin gene mutation (H)     heterozygous type     Tension headache        Past Surgical History:   Procedure Laterality Date     cerebral angiogram      to discuss management of neural- based fistula      SECTION       ENT SURGERY      adnoids removed/tubes in ears at age 3     GYN SURGERY      C section x 2     IR INTRACRANIAL EMBOLIZATION RIGHT  2011     MRI last Nov.      NO HISTORY OF SURGERY  3/20/14    derm     VASCULAR SURGERY      fistula repaired in brain        Family History   Problem Relation Age of Onset     Cerebrovascular Disease Paternal Grandmother      Cancer - colorectal Paternal Grandfather      Colon Cancer Paternal Grandfather      Alcohol/Drug Father      Substance Abuse Father      Alcohol/Drug Maternal Grandmother      Cerebrovascular Disease Other         paternal uncle     Anxiety Disorder Mother      Depression Mother      Obesity Mother      Hypertension Mother      Breast Cancer Mother 62        Stage 0     Cancer No family hx of         no skin cancer     Skin Cancer No family hx of      Melanoma No family hx of         Social History     Socioeconomic History     Marital status:      Spouse name: Edson     Number of children: 2     Years of education: 18     Highest education level: Not on file   Occupational History     Employer: [employer]     Comment:    Social Needs     Financial resource strain: Not on file     Food insecurity     Worry: Not on file     Inability: Not on file     Transportation needs     Medical: Not on file     Non-medical: Not on file   Tobacco Use     Smoking status: Never Smoker     Smokeless tobacco: Never Used   Substance and Sexual Activity     Alcohol use: Yes     Comment: 2-4 drinks per month     Drug use: No     Sexual activity: Yes     Partners: Male     Birth control/protection: Male Surgical     Comment: vasectomy   Lifestyle     Physical activity     Days per week: Not on file     Minutes per session: Not on file     Stress: Not on file   Relationships     Social connections     Talks on phone: Not on file     Gets together: Not on file     Attends Orthodox service: Not on file     Active member of club or organization: Not on file     Attends meetings of clubs or organizations: Not on file     Relationship status: Not on file     Intimate partner violence     Fear of current or ex partner: Not on file     Emotionally abused: Not on file     Physically abused: Not on file     Forced sexual activity: Not on file   Other Topics Concern     Parent/sibling w/ CABG, MI or angioplasty before 65F 55M? Not Asked      Service No     Blood Transfusions No     Caffeine Concern No     Comment: magdy tea in am     Occupational Exposure No     Hobby Hazards No     Sleep Concern No     Stress Concern No     Weight Concern No     Special Diet No     Back Care No     Exercise No     Bike Helmet No     Seat Belt No     Self-Exams No   Social History Narrative    How much exercise per week? Walks 5 days a weekHow much calcium per day? Eats a lot of dairy products  daily. 3-4 times   How much caffeine per day? 1 cup a dayHow much vitamin D per day? DietDo you/your family wear seatbelts?  YesDo you/your family use safety helmets? YesDo you/your family use sunscreen? YesDo you/your family keep firearms in the home? NoDo you/your family have a smoke detector(s)? YesDo you feel safe in your home? YesHas anyone ever touched you in an unwanted manner? No    Renetta Shaikh CMA    10/05/2015        Reviewed [facility] 11-5-20       Outpatient Encounter Medications as of 1/14/2021   Medication Sig Dispense Refill     acetaminophen (ACETAMIN) 500 MG tablet Take 2 tablets by mouth every 6 hours as needed.       escitalopram (LEXAPRO) 20 MG tablet Take 1 tablet (20 mg) by mouth daily 90 tablet 3     losartan (COZAAR) 25 MG tablet Take 1 tablet (25 mg) by mouth daily 90 tablet 3     norethindrone (MICRONOR) 0.35 MG tablet Take 1 tablet (0.35 mg) by mouth daily 84 tablet 4     warfarin ANTICOAGULANT (COUMADIN) 2 MG tablet Take 2 to 3 tablets by mouth daily or as directed by the Coumadin clinic. 65 tablet 6     No facility-administered encounter medications on file as of 1/14/2021.              Review Of Systems  Skin: As above  Eyes: negative  Ears/Nose/Throat: negative  Respiratory: No shortness of breath, dyspnea on exertion, cough, or hemoptysis  Cardiovascular: negative  Gastrointestinal: negative  Genitourinary: negative  Musculoskeletal: negative  Neurologic: negative  Psychiatric: negative  Hematologic/Lymphatic/Immunologic: negative  Endocrine: negative      O:   NAD, WDWN, Alert & Oriented, Mood & Affect wnl, Vitals stable   Here today alone   /89   Pulse 93   SpO2 98%    General appearance normal   Vitals stable   Alert, oriented and in no acute distress     R medial thigh umbilicated papule   L shoulder tag    Pigmented macule son trunk and ext 2-3mm with regulaborders and pigment networks     brown macules on trunk and ext   Red papules on trunk  Flesh colored papules on  trunk   Firm papules on left lower leg and r thigh   The remainder of the full exam was normal; the following areas were examined:  conjunctiva/lids, oral mucosa, neck, peripheral vascular system, abdomen, lymph nodes, digits/nails, eccrine and apocrine glands, scalp/hair, face, neck, chest, abdomen, buttocks, back, RUE, LUE, RLE, LLE       Eyes: Conjunctivae/lids:Normal     ENT: Lips, buccal mucosa, tongue: normal    MSK:Normal    Cardiovascular: peripheral edema none    Pulm: Breathing Normal    Lymph Nodes: No Head and Neck Lymphadenopathy     Neuro/Psych: Orientation:Alert and Orientedx3 ; Mood/Affect:normal       A/P:  1. lentigo, angioma, dermal nevus, nevi, dermatofibroma  2. mollusucm   Treated ment discussed with patient she deeclines  3. L sholder tag   LN2:  Treated with LN2 for 5s for 1-2 cycles. Warned risks of blistering, pain, pigment change, scarring, and incomplete resolution.  Advised patient to return if lesions do not completely resolve.  Wound care sheet given.  It was a pleasure speaking to Satcey Maki today.  BENIGN LESIONS DISCUSSED WITH PATIENT:  I discussed the specifics of tumor, prognosis, and genetics of benign lesions.  I explained that treatment of these lesions would be purely cosmetic and not medically neccessary.  I discussed with patient different removal options including excision, cautery and /or laser.      Nature and genetics of benign skin lesions dicussed with patient.  Signs and Symptoms of skin cancer discussed with patient.  Patient encouraged to perform monthly skin exams.  UV precautions reviewed with patient.  Skin care regimen reviewed with patient: Eliminate harsh soaps, i.e. Dial, zest, irsih spring; Mild soaps such as Cetaphil or Dove sensitive skin, avoid hot or cold showers, aggressive use of emollients including vanicream, cetaphil or cerave discussed with patient.    Risks of non-melanoma skin cancer discussed with patient   Return to clinic 12 months

## 2021-01-14 NOTE — LETTER
2021         RE: Stacey Maki  [address] [place] [address]        Dear Colleague,    Thank you for referring your patient, Stacey Maki, to the [facility]. Please see a copy of my visit note below.    Stacey Maki is an extremely pleasant 40 year old year old female patient here today for spot on left shoulder and r medial thigh. .   Patient states this has been present for some time.  Patient reports the following symptoms:  Had a couple spots on thigh only one left, the other got infected.  .  Patient reports the following previous treatments none.Patient reports the following modifying factors none.  Associated symptoms: none.  Patient has no other skin complaints today.  Remainder of the HPI, Meds, PMH, Allergies, FH, and SH was reviewed in chart.      Past Medical History:   Diagnosis Date     Arteriovenous malformation      Asthma      Chronic cerebral venous sinus thrombosis     diagnosed in , 2009 5wk preg.      Heart disease     mother     Hypertension 2016    mother     Idiopathic intracranial hypertension      Papilledema      Prothrombin gene mutation (H)     heterozygous type     Tension headache        Past Surgical History:   Procedure Laterality Date     cerebral angiogram      to discuss management of neural- based fistula      SECTION       ENT SURGERY  1983    adnoids removed/tubes in ears at age 3     GYN SURGERY      C section x 2     IR INTRACRANIAL EMBOLIZATION RIGHT  2011     MRI last Nov.      NO HISTORY OF SURGERY  3/20/14    derm     VASCULAR SURGERY      fistula repaired in brain        Family History   Problem Relation Age of Onset     Cerebrovascular Disease Paternal Grandmother      Cancer - colorectal Paternal Grandfather      Colon Cancer Paternal Grandfather      Alcohol/Drug Father      Substance Abuse Father      Alcohol/Drug Maternal Grandmother      Cerebrovascular Disease Other          paternal uncle     Anxiety Disorder Mother      Depression Mother      Obesity Mother      Hypertension Mother      Breast Cancer Mother 62        Stage 0     Cancer No family hx of         no skin cancer     Skin Cancer No family hx of      Melanoma No family hx of        Social History     Socioeconomic History     Marital status:      Spouse name: Edson     Number of children: 2     Years of education: 18     Highest education level: Not on file   Occupational History     Employer: [employer]     Comment:    Social Needs     Financial resource strain: Not on file     Food insecurity     Worry: Not on file     Inability: Not on file     Transportation needs     Medical: Not on file     Non-medical: Not on file   Tobacco Use     Smoking status: Never Smoker     Smokeless tobacco: Never Used   Substance and Sexual Activity     Alcohol use: Yes     Comment: 2-4 drinks per month     Drug use: No     Sexual activity: Yes     Partners: Male     Birth control/protection: Male Surgical     Comment: vasectomy   Lifestyle     Physical activity     Days per week: Not on file     Minutes per session: Not on file     Stress: Not on file   Relationships     Social connections     Talks on phone: Not on file     Gets together: Not on file     Attends Caodaism service: Not on file     Active member of club or organization: Not on file     Attends meetings of clubs or organizations: Not on file     Relationship status: Not on file     Intimate partner violence     Fear of current or ex partner: Not on file     Emotionally abused: Not on file     Physically abused: Not on file     Forced sexual activity: Not on file   Other Topics Concern     Parent/sibling w/ CABG, MI or angioplasty before 65F 55M? Not Asked      Service No     Blood Transfusions No     Caffeine Concern No     Comment: magdy tea in am     Occupational Exposure No     Hobby Hazards No     Sleep Concern No     Stress  Concern No     Weight Concern No     Special Diet No     Back Care No     Exercise No     Bike Helmet No     Seat Belt No     Self-Exams No   Social History Narrative    How much exercise per week? Walks 5 days a weekHow much calcium per day? Eats a lot of dairy products daily. 3-4 times   How much caffeine per day? 1 cup a dayHow much vitamin D per day? DietDo you/your family wear seatbelts?  YesDo you/your family use safety helmets? YesDo you/your family use sunscreen? YesDo you/your family keep firearms in the home? NoDo you/your family have a smoke detector(s)? YesDo you feel safe in your home? YesHas anyone ever touched you in an unwanted manner? No    Renetta Shaikh CMA    10/05/2015        [facility]       Outpatient Encounter Medications as of 1/14/2021   Medication Sig Dispense Refill     acetaminophen (ACETAMIN) 500 MG tablet Take 2 tablets by mouth every 6 hours as needed.       escitalopram (LEXAPRO) 20 MG tablet Take 1 tablet (20 mg) by mouth daily 90 tablet 3     losartan (COZAAR) 25 MG tablet Take 1 tablet (25 mg) by mouth daily 90 tablet 3     norethindrone (MICRONOR) 0.35 MG tablet Take 1 tablet (0.35 mg) by mouth daily 84 tablet 4     warfarin ANTICOAGULANT (COUMADIN) 2 MG tablet Take 2 to 3 tablets by mouth daily or as directed by the Coumadin clinic. 65 tablet 6     No facility-administered encounter medications on file as of 1/14/2021.              Review Of Systems  Skin: As above  Eyes: negative  Ears/Nose/Throat: negative  Respiratory: No shortness of breath, dyspnea on exertion, cough, or hemoptysis  Cardiovascular: negative  Gastrointestinal: negative  Genitourinary: negative  Musculoskeletal: negative  Neurologic: negative  Psychiatric: negative  Hematologic/Lymphatic/Immunologic: negative  Endocrine: negative      O:   NAD, WDWN, Alert & Oriented, Mood & Affect wnl, Vitals stable   Here today alone   /89   Pulse 93   SpO2 98%    General appearance normal   Vitals  stable   Alert, oriented and in no acute distress     R medial thigh umbilicated papule   L shoulder tag    Pigmented macule son trunk and ext 2-3mm with regulaborders and pigment networks     brown macules on trunk and ext   Red papules on trunk  Flesh colored papules on trunk     The remainder of the full exam was normal; the following areas were examined:  conjunctiva/lids, oral mucosa, neck, peripheral vascular system, abdomen, lymph nodes, digits/nails, eccrine and apocrine glands, scalp/hair, face, neck, chest, abdomen, buttocks, back, RUE, LUE, RLE, LLE       Eyes: Conjunctivae/lids:Normal     ENT: Lips, buccal mucosa, tongue: normal    MSK:Normal    Cardiovascular: peripheral edema none    Pulm: Breathing Normal    Lymph Nodes: No Head and Neck Lymphadenopathy     Neuro/Psych: Orientation:Alert and Orientedx3 ; Mood/Affect:normal       A/P:  1. lentigo, angioma, dermal nevus, nevi,   2. mollusucm   Treated ment discussed with patient she deeclines  3. L sholder tag   LN2:  Treated with LN2 for 5s for 1-2 cycles. Warned risks of blistering, pain, pigment change, scarring, and incomplete resolution.  Advised patient to return if lesions do not completely resolve.  Wound care sheet given.  It was a pleasure speaking to Stacey Maki today.  BENIGN LESIONS DISCUSSED WITH PATIENT:  I discussed the specifics of tumor, prognosis, and genetics of benign lesions.  I explained that treatment of these lesions would be purely cosmetic and not medically neccessary.  I discussed with patient different removal options including excision, cautery and /or laser.      Nature and genetics of benign skin lesions dicussed with patient.  Signs and Symptoms of skin cancer discussed with patient.  Patient encouraged to perform monthly skin exams.  UV precautions reviewed with patient.  Skin care regimen reviewed with patient: Eliminate harsh soaps, i.e. Dial, zest, irsih spring; Mild soaps such as Cetaphil or Dove sensitive  skin, avoid hot or cold showers, aggressive use of emollients including vanicream, cetaphil or cerave discussed with patient.    Risks of non-melanoma skin cancer discussed with patient   Return to clinic 12 months        Again, thank you for allowing me to participate in the care of your patient.        Sincerely,        Shahzad Rangel MD

## 2021-01-15 ENCOUNTER — VIRTUAL VISIT (OUTPATIENT)
Dept: PSYCHOLOGY | Facility: CLINIC | Age: 41
End: 2021-01-15
Payer: COMMERCIAL

## 2021-01-15 DIAGNOSIS — F41.9 ANXIETY DISORDER, UNSPECIFIED TYPE: Primary | ICD-10-CM

## 2021-01-15 DIAGNOSIS — F33.0 MILD EPISODE OF RECURRENT MAJOR DEPRESSIVE DISORDER (H): ICD-10-CM

## 2021-01-15 PROCEDURE — 90832 PSYTX W PT 30 MINUTES: CPT | Mod: 95

## 2021-01-15 NOTE — PROGRESS NOTES
"  Name:  Stacey Maki  Mrn: [id number]  Date of visit: 1/4/2021    PSYCHOLOGY OUTPATIENT VISIT NOTE       The patient has been notified of the following:      \"We have found that certain health care needs can be provided without the need for a face to face visit.  This service lets us provide the care you need with a phone conversation.       I will have full access to your [place] medical record during this entire phone call.   I will be taking notes for your medical record.      Since this is like an office visit, we will bill your insurance company for this service.       There are potential benefits and risks of telephone visits (e.g. limits to patient confidentiality) that differ from in-person visits.?  Confidentiality still applies for telephone services, and nobody will record the visit.  It is important to be in a quiet, private space that is free of distractions (including cell phone or other devices) during the visit.??      If during the course of the call I believe a telephone visit is not appropriate, you will not be charged for this service\"     Consent has been obtained for this service by care team member: Yes   The author of this note documented a reason for not sharing it with the patient.    PRESENTING PROBLEMS/SYMPTOMS:  Mood symptoms with menstrual cycle (sad, crying).  Anxiety and irritability daily.      INTERVENTION AND RESPONSE:  Cognitive Behavioral therapy, individual.  High stress, helping son with home schooling, work issue all arising this morn.  Discussed and adjusted plan to shorter visit.  Plan to do mindfulness today:  Feel like brain is going to explode.  Going into meditation mode, deeper breathing.  Hearing sons in house, doing activities.  Then back to breath.  Checking back in with \"brain\", not as anxious.  \"I feel calmed.\" steady breathing, body relaxed.  To do list now clear.  Pt expressed satisfaction with visit, helpful.     Plan: complete tx plan  ASSESSMENT:   Visit " "focused on mindfulness practice per pt request and due to feeling of overwhelmed with tasks for today.  Able to practice and somewhat calmer by end of visit.  Continuing context - identifying pattern of seeing self as not measuring up to/for .  Appears to have chronic sadness but reporting sadness only around menstrual cycle.  Mom has had depression and anxiety since childhood.  Dad has always been a drug and alcohol user, marijuana daily, \"always needs a high.\"  Dad's family \"partiers\" all alcoholic.  Maternal grandma is narcissist and \"a closet alcoholic, family secret, no one has ever seen her drink.\"      Medical History:  Blood clot in brain, first postpartum period; clotting disorder is Prothrombin, familial genetic mutation.  Now taking Warfarin.    Mental Status Assessment:  Appearance:   unknown  Eye Contact:   n/a  Psychomotor Behavior: unknown  Attitude:   Cooperative   Orientation:   All  Speech   Rate / Production: Normal/ Responsive Normal    Volume:  Normal   Mood:    Anxious  Thought Content:  Clear   Thought Form:  Coherent  Logical   Insight:    Fair     DIAGNOSIS:  Unspecified Anxiety disorder; MDD recurrent, mild;     PLAN:    Patient to schedule followup visit.       Total time spent equals 26 minutes, individual psychotherapy.  Telephone  11:05-31             "

## 2021-01-25 ENCOUNTER — VIRTUAL VISIT (OUTPATIENT)
Dept: PSYCHOLOGY | Facility: CLINIC | Age: 41
End: 2021-01-25
Payer: COMMERCIAL

## 2021-01-25 DIAGNOSIS — F41.9 ANXIETY DISORDER, UNSPECIFIED TYPE: Primary | ICD-10-CM

## 2021-01-25 DIAGNOSIS — F33.0 MILD EPISODE OF RECURRENT MAJOR DEPRESSIVE DISORDER (H): ICD-10-CM

## 2021-01-25 PROCEDURE — 90837 PSYTX W PT 60 MINUTES: CPT | Mod: 95

## 2021-01-27 NOTE — PROGRESS NOTES
"  Name:  Stacey Maki  Mrn: [id number]  Date of visit: 1/25/2021    PSYCHOLOGY OUTPATIENT VISIT NOTE       The patient has been notified of the following:      \"We have found that certain health care needs can be provided without the need for a face to face visit.  This service lets us provide the care you need with a phone conversation.       I will have full access to your [place] medical record during this entire phone call.   I will be taking notes for your medical record.      Since this is like an office visit, we will bill your insurance company for this service.        Confidentiality still applies for telephone services, and nobody will record the visit.  It is important to be in a quiet, private space that is free of distractions during the visit.??      Consent has been obtained for this service by care team member: Yes   Telehealth visit appropriate.    The author of this note documented a reason for not sharing it with the patient.    PRESENTING PROBLEMS/SYMPTOMS:  Mood symptoms with menstrual cycle (sad, crying).  Anxiety and irritability daily.  (sons: 13yo and 9yo)  INTERVENTION AND RESPONSE:  Cognitive Behavioral therapy, individual.  Discussed family concerns, anxiety re upcoming ski trip, older son's \"izaguirre and hurtful\" comments to her, \"can make me cry; younger son's attentional problems and \"he mounika me with love\" clingy.  He does not choose time with his brother or dad.  He spends much time on phone and \"gets obsessed with me\", at night uses \"poopy talk\" (like a younger child), jumps on bed, follows her from room to room and sits on her lap.  Discussed and encouraged a neuropsych evaluation for ADHD and to include other psychological evaluation.   She manages her emotions around older son with self talk and taking time out from him.     Themes: freedom, escape, discipline.  Refrain - I don't know.    Goals:  Increase strategies for coping with older son's \"hurtful comments.\"  Schedule " "Neuropsych eval for younger son.  Develop better boundaries with younger son.  Intimacy with ? (he's not affectionate, does not tell kids he loves them, only told her 1x; he's my rock.)    Plan: complete tx plan  ASSESSMENT:   Reported concerns re taking Progestin, unsure if beneficial for mood, period irregular.  Encouraged contacting prescriber for guidance.  Plan to continue with Lexapro.  Difficulties in family relationships identified, anxiety around these.  Hx of unstable parenting in childhood.      Continuing context - identifying pattern of seeing self as not measuring up to/for .  Appears to have chronic sadness but reporting sadness only around menstrual cycle.  Mom has had depression and anxiety since childhood and is \"emotionally unstable.\"  Dad has always been a drug and alcohol user, marijuana daily, \"always needs a high.\"  Dad's family \"partiers\" all alcoholic.  Maternal grandma is narcissist and \"a closet alcoholic, family secret, no one has ever seen her drink.\"   is \"not affectionate\" they differ in politics, does not tell kids of her he loves them; \"he's my rock.\"  Medical History:  Blood clot in brain, first postpartum period; clotting disorder is Prothrombin, familial genetic mutation.  Now taking Warfarin.    Mental Status Assessment:  Appearance:   unknown  Eye Contact:   n/a  Psychomotor Behavior: unknown  Attitude:   Cooperative   Orientation:   All  Speech   Rate / Production: Normal/ Responsive Normal    Volume:  Normal   Mood:    Anxious, sad  Thought Content:  Clear   Thought Form:  Coherent  Logical   Insight:    Fair     DIAGNOSIS:  Unspecified Anxiety disorder; MDD recurrent, mild;     PLAN:    Patient to schedule followup visit.       Total time spent equals 59 minutes, individual psychotherapy.  Telephone  2:00-59             "

## 2021-01-28 ENCOUNTER — MYC MEDICAL ADVICE (OUTPATIENT)
Dept: PHARMACY | Facility: CLINIC | Age: 41
End: 2021-01-28

## 2021-02-01 ENCOUNTER — MYC MEDICAL ADVICE (OUTPATIENT)
Dept: OBGYN | Facility: CLINIC | Age: 41
End: 2021-02-01

## 2021-02-01 DIAGNOSIS — F32.81 PREMENSTRUAL DYSPHORIC DISORDER: ICD-10-CM

## 2021-02-04 RX ORDER — ACETAMINOPHEN AND CODEINE PHOSPHATE 120; 12 MG/5ML; MG/5ML
0.35 SOLUTION ORAL DAILY
Qty: 84 TABLET | Refills: 4 | Status: SHIPPED | OUTPATIENT
Start: 2021-02-04 | End: 2021-12-29

## 2021-03-01 ENCOUNTER — TELEPHONE (OUTPATIENT)
Dept: ANTICOAGULATION | Facility: CLINIC | Age: 41
End: 2021-03-01

## 2021-03-01 NOTE — TELEPHONE ENCOUNTER
ANTICOAGULATION     Stacey Maki is overdue for INR check.      Left message for patient to call and schedule lab appointment as soon as possible. If returning call, please schedule.     Praveena Blackwood RN    Addendum 3/1/21 Pt called back reporting she will for to [facility] this week to get an INR and doesn't need an appointment to go there. Praveena Blackwood RN

## 2021-03-03 ENCOUNTER — HOSPITAL ENCOUNTER (OUTPATIENT)
Dept: LAB | Facility: CLINIC | Age: 41
Discharge: HOME | End: 2021-03-03
Attending: INTERNAL MEDICINE | Admitting: INTERNAL MEDICINE
Payer: COMMERCIAL

## 2021-03-03 ENCOUNTER — ANTICOAGULATION THERAPY VISIT (OUTPATIENT)
Dept: ANTICOAGULATION | Facility: CLINIC | Age: 41
End: 2021-03-03

## 2021-03-03 DIAGNOSIS — D68.52 PROTHROMBIN GENE MUTATION (H): ICD-10-CM

## 2021-03-03 DIAGNOSIS — Z79.01 LONG TERM CURRENT USE OF ANTICOAGULANT THERAPY: ICD-10-CM

## 2021-03-03 DIAGNOSIS — Z86.718 HISTORY OF CEREBRAL VENOUS SINUS THROMBOSIS: ICD-10-CM

## 2021-03-03 LAB — INR PPP: 3.4 (ref 0.86–1.14)

## 2021-03-03 PROCEDURE — 36415 COLL VENOUS BLD VENIPUNCTURE: CPT | Performed by: INTERNAL MEDICINE

## 2021-03-03 PROCEDURE — 85610 PROTHROMBIN TIME: CPT | Performed by: INTERNAL MEDICINE

## 2021-03-03 RX ORDER — WARFARIN SODIUM 2 MG/1
TABLET ORAL
Qty: 65 TABLET | Refills: 6 | Status: SHIPPED | OUTPATIENT
Start: 2021-03-03 | End: 2021-10-18

## 2021-03-03 NOTE — PROGRESS NOTES
ANTICOAGULATION FOLLOW-UP CLINIC VISIT    Patient Name:  Stacey Maki  Date:  3/3/2021  Contact Type:  Telephone    SUBJECTIVE:         OBJECTIVE    Recent labs: (last 7 days)     03/03/21  1516   INR 3.40*       ASSESSMENT / PLAN  INR assessment SUPRA    Recheck INR In: 3 WEEKS    INR Location Clinic      Anticoagulation Summary  As of 3/3/2021    INR goal:  2.0-3.0   TTR:  80.8 % (8.9 mo)   INR used for dosing:  3.40 (3/3/2021)   Warfarin maintenance plan:  6 mg (2 mg x 3) every Tue; 4 mg (2 mg x 2) all other days   Full warfarin instructions:  3/3: 2 mg; Otherwise 6 mg every Tue; 4 mg all other days   Weekly warfarin total:  30 mg   Plan last modified:  Karina Khoury RN (4/7/2020)   Next INR check:  3/24/2021   Priority:  Maintenance   Target end date:  Indefinite    Indications    Long term current use of anticoagulant therapy [Z79.01]  Prothrombin gene mutation (H) [D68.52]             Anticoagulation Episode Summary     INR check location:      Preferred lab:      Send INR reminders to:  [facility]    Comments:  Patient has 2 mg Tablets Patient contact number [phone]  HIPAA INFO OK to leave messages on home or cell phone leave msg on cell [phone]  Goes to [facility] for labs/results do not come to in-basket +++SEND POOL MESSAG    OK to speak byron Potts      Anticoagulation Care Providers     Provider Role Specialty Phone number    Danny Clark MD Referring Hematology [phone]            See the Encounter Report to view Anticoagulation Flowsheet and Dosing Calendar (Go to Encounters tab in chart review, and find the Anticoagulation Therapy Visit.  Left message for patient with results and dosing recommendations. Asked patient to call back to report any missed doses, falls, signs and symptoms of bleeding or clotting, any changes in health, medication, or diet. Asked patient to call back with any questions or concerns.      Karina Khoury RN

## 2021-03-17 ENCOUNTER — MYC MEDICAL ADVICE (OUTPATIENT)
Dept: OBGYN | Facility: CLINIC | Age: 41
End: 2021-03-17

## 2021-03-17 DIAGNOSIS — B37.31 YEAST INFECTION OF THE VAGINA: Primary | ICD-10-CM

## 2021-03-18 ENCOUNTER — DOCUMENTATION ONLY (OUTPATIENT)
Dept: ANTICOAGULATION | Facility: CLINIC | Age: 41
End: 2021-03-18

## 2021-03-18 DIAGNOSIS — D68.52 PROTHROMBIN GENE MUTATION (H): ICD-10-CM

## 2021-03-18 DIAGNOSIS — Z79.01 LONG TERM CURRENT USE OF ANTICOAGULANT THERAPY: ICD-10-CM

## 2021-03-18 RX ORDER — FLUCONAZOLE 150 MG/1
150 TABLET ORAL ONCE
Qty: 1 TABLET | Refills: 1 | Status: SHIPPED | OUTPATIENT
Start: 2021-03-18 | End: 2021-03-18

## 2021-03-18 NOTE — TELEPHONE ENCOUNTER
MyChart message received from patient reporting symptoms of vaginal yeast infection.     Diflucan sent to preferred pharmacy per protocol.    MyChart message sent to patient informing her this was done, medication instructions, and when to call clinic.

## 2021-03-18 NOTE — PROGRESS NOTES
ANTICOAGULATION  MANAGEMENT     Interacting Medication Review    Interacting medication(s): Fluconazole (Diflucan) with warfarin.    Duration: Single dose on 3/18/25    Indication: Yeast    New medication?: Yes, interaction may increase INR and risk of bleeding       PLAN     Consulted with Laxmi, recommend to adjust dose to 2mg. Recheck INR on as scheduled on 3/24/21    Left a detailed message for Stacey    Anticoagulation Calendar updated    Leticia Cho RN

## 2021-03-25 ENCOUNTER — ANTICOAGULATION THERAPY VISIT (OUTPATIENT)
Dept: ANTICOAGULATION | Facility: CLINIC | Age: 41
End: 2021-03-25

## 2021-03-25 ENCOUNTER — HOSPITAL ENCOUNTER (OUTPATIENT)
Dept: LAB | Facility: CLINIC | Age: 41
Discharge: HOME | End: 2021-03-25
Attending: INTERNAL MEDICINE | Admitting: INTERNAL MEDICINE
Payer: COMMERCIAL

## 2021-03-25 DIAGNOSIS — D68.52 PROTHROMBIN GENE MUTATION (H): ICD-10-CM

## 2021-03-25 DIAGNOSIS — Z86.718 HISTORY OF CEREBRAL VENOUS SINUS THROMBOSIS: ICD-10-CM

## 2021-03-25 DIAGNOSIS — Z79.01 LONG TERM CURRENT USE OF ANTICOAGULANT THERAPY: ICD-10-CM

## 2021-03-25 LAB — INR PPP: 2.25 (ref 0.86–1.14)

## 2021-03-25 PROCEDURE — 36415 COLL VENOUS BLD VENIPUNCTURE: CPT | Performed by: INTERNAL MEDICINE

## 2021-03-25 PROCEDURE — 85610 PROTHROMBIN TIME: CPT | Performed by: INTERNAL MEDICINE

## 2021-03-25 NOTE — PROGRESS NOTES
ANTICOAGULATION FOLLOW-UP CLINIC VISIT    Patient Name:  Stacey Maki  Date:  3/25/2021  Contact Type:  Telephone    SUBJECTIVE:         OBJECTIVE    Recent labs: (last 7 days)     21  1531   INR 2.25*       ASSESSMENT / PLAN  INR assessment THER    Recheck INR In: 3 WEEKS    INR Location Clinic      Anticoagulation Summary  As of 3/25/2021    INR goal:  2.0-3.0   TTR:  78.0 % (8.9 mo)   INR used for dosin.25 (3/25/2021)   Warfarin maintenance plan:  6 mg (2 mg x 3) every Tue; 4 mg (2 mg x 2) all other days   Full warfarin instructions:  6 mg every Tue; 4 mg all other days   Weekly warfarin total:  30 mg   No change documented:  Merced Cadet RN   Plan last modified:  Karina Khoury RN (2020)   Next INR check:  4/15/2021   Priority:  Maintenance   Target end date:  Indefinite    Indications    Long term current use of anticoagulant therapy [Z79.01]  Prothrombin gene mutation (H) [D68.52]             Anticoagulation Episode Summary     INR check location:      Preferred lab:      Send INR reminders to:  [facility]    Comments:  Patient has 2 mg Tablets Patient contact number [phone]  HIPAA INFO OK to leave messages on home or cell phone leave msg on cell [phone]  Goes to [facility] for labs/results do not come to in-basket +++SEND POOL MESSAG    OK to speak byron Potts      Anticoagulation Care Providers     Provider Role Specialty Phone number    Danny Clark MD Referring Hematology [phone]            See the Encounter Report to view Anticoagulation Flowsheet and Dosing Calendar (Go to Encounters tab in chart review, and find the Anticoagulation Therapy Visit)    Left message for patient with results and dosing recommendations. Asked patient to call back to report any missed doses, falls, signs and symptoms of bleeding or clotting, any changes in health, medication, or diet. Asked patient to call back with any questions or concerns.    Merced VIEIRA  Helio RN

## 2021-03-29 NOTE — TELEPHONE ENCOUNTER
PRIOR AUTHORIZATION DENIED    Medication: FLUoxetine (PROZAC) 10 MG capsule-DENIED    Denial Date: 11/6/2018    Denial Rational:           Appeal Information:              Detail Level: Zone Quality 265: Biopsy Follow-Up: Biopsy results reviewed, communicated, tracked, and documented

## 2021-04-13 ENCOUNTER — TELEPHONE (OUTPATIENT)
Dept: HEMATOLOGY | Facility: CLINIC | Age: 41
End: 2021-04-13

## 2021-04-15 ENCOUNTER — MYC MEDICAL ADVICE (OUTPATIENT)
Dept: INTERNAL MEDICINE | Facility: CLINIC | Age: 41
End: 2021-04-15

## 2021-04-15 DIAGNOSIS — F41.1 GAD (GENERALIZED ANXIETY DISORDER): ICD-10-CM

## 2021-04-15 DIAGNOSIS — F32.A DEPRESSION, UNSPECIFIED DEPRESSION TYPE: ICD-10-CM

## 2021-04-15 RX ORDER — ESCITALOPRAM OXALATE 20 MG/1
20 TABLET ORAL DAILY
Qty: 90 TABLET | Refills: 0 | Status: SHIPPED | OUTPATIENT
Start: 2021-04-15 | End: 2021-07-14

## 2021-04-22 ENCOUNTER — TELEPHONE (OUTPATIENT)
Dept: ANTICOAGULATION | Facility: CLINIC | Age: 41
End: 2021-04-22

## 2021-04-22 NOTE — TELEPHONE ENCOUNTER
ANTICOAGULATION     Stacey Maki is overdue for INR check.      Left message for patient to call and schedule lab appointment as soon as possible. If returning call, please schedule.     Leticia Cho RN

## 2021-04-23 ENCOUNTER — ANTICOAGULATION THERAPY VISIT (OUTPATIENT)
Dept: ANTICOAGULATION | Facility: CLINIC | Age: 41
End: 2021-04-23

## 2021-04-23 ENCOUNTER — HOSPITAL ENCOUNTER (OUTPATIENT)
Dept: LAB | Facility: CLINIC | Age: 41
Discharge: HOME | End: 2021-04-23
Attending: INTERNAL MEDICINE | Admitting: INTERNAL MEDICINE
Payer: COMMERCIAL

## 2021-04-23 DIAGNOSIS — Z86.718 HISTORY OF CEREBRAL VENOUS SINUS THROMBOSIS: ICD-10-CM

## 2021-04-23 DIAGNOSIS — D68.52 PROTHROMBIN GENE MUTATION (H): ICD-10-CM

## 2021-04-23 DIAGNOSIS — Z79.01 LONG TERM CURRENT USE OF ANTICOAGULANT THERAPY: ICD-10-CM

## 2021-04-23 LAB — INR PPP: 3.44 (ref 0.86–1.14)

## 2021-04-23 PROCEDURE — 85610 PROTHROMBIN TIME: CPT | Performed by: INTERNAL MEDICINE

## 2021-04-23 PROCEDURE — 36415 COLL VENOUS BLD VENIPUNCTURE: CPT | Performed by: INTERNAL MEDICINE

## 2021-04-23 NOTE — PROGRESS NOTES
"4/23/21 ADDENDUM  Stacey calling back.  She is unable to explain supratherapeutic INR results today.  She reported that she started Progestin recently for mental health.  With this medication, she did note \"clotting during my period\".  Recommendation and next date of INR reviewed with patient.  Signs of bleeding resolved.  MARCOS Cotton            ANTICOAGULATION FOLLOW-UP CLINIC VISIT    Patient Name:  Stacey Maki  Date:  4/23/2021  Contact Type:  Telephone    SUBJECTIVE:         OBJECTIVE    Recent labs: (last 7 days)     04/23/21  1245   INR 3.44*       ASSESSMENT / PLAN  INR assessment SUPRA    Recheck INR In: 10 DAYS    INR Location Clinic      Anticoagulation Summary  As of 4/23/2021    INR goal:  2.0-3.0   TTR:  74.0 % (8.9 mo)   INR used for dosing:  3.44 (4/23/2021)   Warfarin maintenance plan:  4 mg (2 mg x 2) every day   Full warfarin instructions:  4/23: 2 mg; Otherwise 4 mg every day   Weekly warfarin total:  28 mg   Plan last modified:  Merced Cadet RN (4/23/2021)   Next INR check:  5/4/2021   Priority:  Maintenance   Target end date:  Indefinite    Indications    Long term current use of anticoagulant therapy [Z79.01]  Prothrombin gene mutation (H) [D68.52]             Anticoagulation Episode Summary     INR check location:      Preferred lab:      Send INR reminders to:  [facility]    Comments:  Patient has 2 mg Tablets Patient contact number [phone]  HIPAA INFO OK to leave messages on home or cell phone leave msg on cell [phone]  Goes to [facility] for labs/results do not come to in-basket +++SEND POOL MESSAG    OK to speak byron Potts      Anticoagulation Care Providers     Provider Role Specialty Phone number    Danny Clark MD Referring Hematology [phone]            See the Encounter Report to view Anticoagulation Flowsheet and Dosing Calendar (Go to Encounters tab in chart review, and find the Anticoagulation Therapy Visit)    Left message for patient " with results and dosing recommendations. Asked patient to call back to report any missed doses, falls, signs and symptoms of bleeding or clotting, any changes in health, medication, or diet. Asked patient to call back with any questions or concerns.  Per protocol, recommended decreasing today's dose of warfarin and decreasing maintenance dose by 6%.    Merced Cadet RN

## 2021-05-03 ENCOUNTER — TELEPHONE (OUTPATIENT)
Dept: ANTICOAGULATION | Facility: CLINIC | Age: 41
End: 2021-05-03

## 2021-05-11 ENCOUNTER — ANTICOAGULATION THERAPY VISIT (OUTPATIENT)
Dept: ANTICOAGULATION | Facility: CLINIC | Age: 41
End: 2021-05-11

## 2021-05-11 ENCOUNTER — HOSPITAL ENCOUNTER (OUTPATIENT)
Dept: LAB | Facility: CLINIC | Age: 41
Discharge: HOME | End: 2021-05-11
Attending: INTERNAL MEDICINE | Admitting: INTERNAL MEDICINE
Payer: COMMERCIAL

## 2021-05-11 DIAGNOSIS — Z79.01 LONG TERM CURRENT USE OF ANTICOAGULANT THERAPY: ICD-10-CM

## 2021-05-11 DIAGNOSIS — D68.52 PROTHROMBIN GENE MUTATION (H): ICD-10-CM

## 2021-05-11 DIAGNOSIS — Z86.718 HISTORY OF CEREBRAL VENOUS SINUS THROMBOSIS: ICD-10-CM

## 2021-05-11 LAB — INR PPP: 3.15 (ref 0.86–1.14)

## 2021-05-11 PROCEDURE — 85610 PROTHROMBIN TIME: CPT | Performed by: INTERNAL MEDICINE

## 2021-05-11 PROCEDURE — 36415 COLL VENOUS BLD VENIPUNCTURE: CPT | Performed by: INTERNAL MEDICINE

## 2021-05-11 NOTE — PROGRESS NOTES
ANTICOAGULATION FOLLOW-UP CLINIC VISIT    Patient Name:  Stacey Maki  Date:  5/11/2021  Contact Type:  Telephone    SUBJECTIVE:  Patient Findings     Comments:  Left message for patient to eat a moderate serving of Vitamin K today for a gradual reduce in dosing.  Did not recommend a dose adjustment at time of encounter.        Clinical Outcomes     Comments:  Left message for patient to eat a moderate serving of Vitamin K today for a gradual reduce in dosing.  Did not recommend a dose adjustment at time of encounter.           OBJECTIVE    Recent labs: (last 7 days)     05/11/21  1425   INR 3.15*       ASSESSMENT / PLAN  INR assessment SUPRA    Recheck INR In: 3 WEEKS    INR Location Clinic      Anticoagulation Summary  As of 5/11/2021    INR goal:  2.0-3.0   TTR:  67.2 % (8.9 mo)   INR used for dosing:  3.15 (5/11/2021)   Warfarin maintenance plan:  4 mg (2 mg x 2) every day   Full warfarin instructions:  4 mg every day   Weekly warfarin total:  28 mg   Plan last modified:  Merced Cadet RN (4/23/2021)   Next INR check:  6/1/2021   Priority:  Maintenance   Target end date:  Indefinite    Indications    Long term current use of anticoagulant therapy [Z79.01]  Prothrombin gene mutation (H) [D68.52]             Anticoagulation Episode Summary     INR check location:      Preferred lab:      Send INR reminders to:  [facility]    Comments:  Patient has 2 mg Tablets Patient contact number [phone]  HIPAA INFO OK to leave messages on home or cell phone leave msg on cell [phone]  Goes to [facility] for labs/results do not come to in-basket +++SEND POOL MESSAG    OK to speak byron Potts      Anticoagulation Care Providers     Provider Role Specialty Phone number    Danny Clark MD Referring Hematology [phone]            See the Encounter Report to view Anticoagulation Flowsheet and Dosing Calendar (Go to Encounters tab in chart review, and find the Anticoagulation Therapy  Visit)    INR/CFX/F2 RESULT:INR result is 3.15    ASSESSMENT:Left message for patient with results and dosing recommendations. Asked patient to call back to report any missed doses, falls, signs and symptoms of bleeding or clotting, any changes in health, medication, or diet. Asked patient to call back with any questions or concerns.    DOSING ADJUSTMENT:continue 4mg daily    NEXT INR/FACTOR X OR FACTOR II:6/1    PROTOCOL FOLLOWED:goal 2-3    Caesar Barrera, RN

## 2021-05-13 ENCOUNTER — DOCUMENTATION ONLY (OUTPATIENT)
Dept: ANTICOAGULATION | Facility: CLINIC | Age: 41
End: 2021-05-13

## 2021-05-13 DIAGNOSIS — Z79.01 LONG TERM CURRENT USE OF ANTICOAGULANT THERAPY: Primary | ICD-10-CM

## 2021-05-13 NOTE — PROGRESS NOTES
ANTICOAGULATION MANAGEMENT      Stacey Maki due for annual renewal of referral to anticoagulation monitoring. Order pended for your review and signature.      ANTICOAGULATION SUMMARY      Warfarin indication(s)     Prothrombin gene mutation (H) [D68.52]    Heart valve present?  NO       Current goal range   INR: 2.0-3.0     Goal appropriate for indication? Yes, INR 2-3 appropriate for hx of DVT, PE, hypercoagulable state, Afib, LVAD, or bileaflet AVR without risk factors     Current duration of therapy Indefinite/long term therapy   Time in Therapeutic Range (TTR)  (Goal > 60%) 67.2 %       Office visit with referring provider's group within last year yes on 7/30/20 (virtual visit with Norman Albert)       Caesar Dumont, RN

## 2021-06-08 ENCOUNTER — TELEPHONE (OUTPATIENT)
Dept: ANTICOAGULATION | Facility: CLINIC | Age: 41
End: 2021-06-08

## 2021-06-10 ENCOUNTER — HOSPITAL ENCOUNTER (OUTPATIENT)
Dept: LAB | Facility: CLINIC | Age: 41
Discharge: HOME | End: 2021-06-10
Attending: INTERNAL MEDICINE | Admitting: INTERNAL MEDICINE
Payer: COMMERCIAL

## 2021-06-10 DIAGNOSIS — Z79.01 LONG TERM CURRENT USE OF ANTICOAGULANT THERAPY: ICD-10-CM

## 2021-06-10 LAB — INR PPP: 2.4 (ref 0.86–1.14)

## 2021-06-10 PROCEDURE — 36415 COLL VENOUS BLD VENIPUNCTURE: CPT | Performed by: INTERNAL MEDICINE

## 2021-06-10 PROCEDURE — 85610 PROTHROMBIN TIME: CPT | Performed by: INTERNAL MEDICINE

## 2021-07-12 DIAGNOSIS — F32.A DEPRESSION, UNSPECIFIED DEPRESSION TYPE: ICD-10-CM

## 2021-07-12 DIAGNOSIS — F41.1 GAD (GENERALIZED ANXIETY DISORDER): ICD-10-CM

## 2021-07-14 ENCOUNTER — DOCUMENTATION ONLY (OUTPATIENT)
Dept: ANTICOAGULATION | Facility: CLINIC | Age: 41
End: 2021-07-14

## 2021-07-14 DIAGNOSIS — D68.52 PROTHROMBIN GENE MUTATION (H): ICD-10-CM

## 2021-07-14 DIAGNOSIS — Z79.01 LONG TERM CURRENT USE OF ANTICOAGULANT THERAPY: Primary | ICD-10-CM

## 2021-07-14 NOTE — TELEPHONE ENCOUNTER
ESCITALOPRAM 20MG TABLETS    Last Written Prescription Date: 4/15/2021  Last Fill Quantity: 90,   # refills: 0  Last Office Visit : 7/24/2020  Future Office visit:  None    Routing refill request to provider for review/approval because:  Second Request    Office due     PHQ-9 needs updating   30 day pended   Refer to clinic for review       Lamar Maldonado RN  Central Triage Red Flags/Med Refills

## 2021-07-15 RX ORDER — ESCITALOPRAM OXALATE 20 MG/1
20 TABLET ORAL DAILY
Qty: 90 TABLET | Refills: 1 | Status: SHIPPED | OUTPATIENT
Start: 2021-07-15 | End: 2022-01-05

## 2021-07-15 NOTE — TELEPHONE ENCOUNTER
Refill request received for lexapro tablets. Patient has annual scheduled with Dr.Melnik willis, short supply sent per protocol.

## 2021-07-21 ENCOUNTER — TELEPHONE (OUTPATIENT)
Dept: ANTICOAGULATION | Facility: CLINIC | Age: 41
End: 2021-07-21

## 2021-07-21 ENCOUNTER — LAB (OUTPATIENT)
Dept: LAB | Facility: CLINIC | Age: 41
End: 2021-07-21
Payer: COMMERCIAL

## 2021-07-21 DIAGNOSIS — Z79.01 LONG TERM CURRENT USE OF ANTICOAGULANT THERAPY: ICD-10-CM

## 2021-07-21 DIAGNOSIS — I10 BENIGN ESSENTIAL HYPERTENSION: ICD-10-CM

## 2021-07-21 LAB — INR PPP: 2.59 (ref 0.85–1.15)

## 2021-07-21 PROCEDURE — 85610 PROTHROMBIN TIME: CPT

## 2021-07-21 PROCEDURE — 36415 COLL VENOUS BLD VENIPUNCTURE: CPT

## 2021-07-21 NOTE — TELEPHONE ENCOUNTER
ANTICOAGULATION     Staceysandra Maki is overdue for INR check.      Left message for patient to call and schedule lab appointment as soon as possible. Patient normally does labs at [facility] which does not require an appointment, so reminded patient that she is due for INR check and can go in anytime.    Kellie Hernandez, Pharmacy Intern

## 2021-07-22 ENCOUNTER — ANTICOAGULATION THERAPY VISIT (OUTPATIENT)
Dept: ANTICOAGULATION | Facility: CLINIC | Age: 41
End: 2021-07-22

## 2021-07-22 DIAGNOSIS — Z79.01 LONG TERM CURRENT USE OF ANTICOAGULANT THERAPY: Primary | ICD-10-CM

## 2021-07-22 DIAGNOSIS — D68.52 PROTHROMBIN GENE MUTATION (H): ICD-10-CM

## 2021-07-22 NOTE — PROGRESS NOTES
ANTICOAGULATION MANAGEMENT     Stacey Maki 41 year old female is on warfarin with therapeutic INR result. (Goal INR 2.0-3.0)    Recent labs: (last 7 days)     07/21/21  1812   INR 2.59*       ASSESSMENT     Source(s): Chart Review       Warfarin doses taken: Warfarin taken as instructed    Diet: No new diet changes identified    New illness, injury, or hospitalization: No    Medication/supplement changes: None noted    Signs or symptoms of bleeding or clotting: No    Previous INR: Therapeutic last visit; previously outside of goal range    Additional findings: None     PLAN     Recommended plan for no diet, medication or health factor changes affecting INR     Dosing Instructions: Continue your current warfarin dose with next INR in 4 weeks       Summary  As of 7/22/2021    Full warfarin instructions:  4 mg every day   Next INR check:  8/18/2021             Detailed voice message left for Stacey with dosing instructions and follow up date.     Contact [phone] to schedule and with any changes, questions or concerns.     Education provided: Please call back if any changes to your diet, medications or how you've been taking warfarin and Contact [phone] with any changes, questions or concerns.     Plan made per [facility] anticoagulation protocol    Merced Cadet, RN  Anticoagulation Clinic  7/22/2021    _______________________________________________________________________     Anticoagulation Episode Summary     Current INR goal:  2.0-3.0   TTR:  67.5 % (9.6 mo)   Target end date:  Indefinite   Send INR reminders to:  [facility]    Indications    Long term current use of anticoagulant therapy [Z79.01]  Prothrombin gene mutation (H) [D68.52]           Comments:  Patient has 2 mg Tablets Patient contact number [phone]  HIPAA INFO OK to leave messages on home or cell phone leave msg on cell [phone]  Goes to [facility] for labs/results do not come to in-basket +++SEND POOL MESSAG    OK to  speak tbo Edson Potts         Anticoagulation Care Providers     Provider Role Specialty Phone number    Danny Clark MD Referring Hematology [phone]

## 2021-07-23 RX ORDER — LOSARTAN POTASSIUM 25 MG/1
TABLET ORAL
Qty: 30 TABLET | Refills: 0 | Status: SHIPPED | OUTPATIENT
Start: 2021-07-23 | End: 2021-08-20

## 2021-07-23 NOTE — TELEPHONE ENCOUNTER
Received refill request for Losartan.  Has visit scheduled in 2 weeks with dr Lange.    Refilled per protocol.

## 2021-08-20 ENCOUNTER — OFFICE VISIT (OUTPATIENT)
Dept: INTERNAL MEDICINE | Facility: CLINIC | Age: 41
End: 2021-08-20
Payer: COMMERCIAL

## 2021-08-20 VITALS
TEMPERATURE: 98.2 F | WEIGHT: 149.1 LBS | OXYGEN SATURATION: 97 % | DIASTOLIC BLOOD PRESSURE: 82 MMHG | HEIGHT: 63 IN | RESPIRATION RATE: 20 BRPM | HEART RATE: 90 BPM | BODY MASS INDEX: 26.42 KG/M2 | SYSTOLIC BLOOD PRESSURE: 122 MMHG

## 2021-08-20 DIAGNOSIS — I10 BENIGN ESSENTIAL HYPERTENSION: Primary | ICD-10-CM

## 2021-08-20 DIAGNOSIS — D68.52 PROTHROMBIN GENE MUTATION (H): ICD-10-CM

## 2021-08-20 DIAGNOSIS — F41.9 ANXIETY: ICD-10-CM

## 2021-08-20 DIAGNOSIS — G08 CEREBRAL VENOUS SINUS THROMBOSIS: ICD-10-CM

## 2021-08-20 DIAGNOSIS — F32.81 PREMENSTRUAL DYSPHORIC DISORDER: ICD-10-CM

## 2021-08-20 DIAGNOSIS — F33.42 RECURRENT MAJOR DEPRESSIVE DISORDER, IN FULL REMISSION (H): ICD-10-CM

## 2021-08-20 PROCEDURE — 96127 BRIEF EMOTIONAL/BEHAV ASSMT: CPT | Performed by: INTERNAL MEDICINE

## 2021-08-20 PROCEDURE — 99214 OFFICE O/P EST MOD 30 MIN: CPT | Performed by: INTERNAL MEDICINE

## 2021-08-20 RX ORDER — LOSARTAN POTASSIUM 25 MG/1
25 TABLET ORAL DAILY
Qty: 90 TABLET | Refills: 3 | Status: SHIPPED | OUTPATIENT
Start: 2021-08-20 | End: 2022-08-12

## 2021-08-20 ASSESSMENT — ANXIETY QUESTIONNAIRES
7. FEELING AFRAID AS IF SOMETHING AWFUL MIGHT HAPPEN: NOT AT ALL
3. WORRYING TOO MUCH ABOUT DIFFERENT THINGS: NOT AT ALL
GAD7 TOTAL SCORE: 2
6. BECOMING EASILY ANNOYED OR IRRITABLE: SEVERAL DAYS
1. FEELING NERVOUS, ANXIOUS, OR ON EDGE: SEVERAL DAYS
5. BEING SO RESTLESS THAT IT IS HARD TO SIT STILL: NOT AT ALL
2. NOT BEING ABLE TO STOP OR CONTROL WORRYING: NOT AT ALL

## 2021-08-20 ASSESSMENT — MIFFLIN-ST. JEOR: SCORE: 1300.44

## 2021-08-20 ASSESSMENT — PATIENT HEALTH QUESTIONNAIRE - PHQ9
5. POOR APPETITE OR OVEREATING: NOT AT ALL
SUM OF ALL RESPONSES TO PHQ QUESTIONS 1-9: 3

## 2021-08-20 NOTE — NURSING NOTE
"/82 (BP Location: Left arm, Patient Position: Sitting, Cuff Size: Adult Regular)   Pulse 90   Temp 98.2  F (36.8  C) (Oral)   Resp 20   Ht 1.6 m (5' 3\")   Wt 67.6 kg (149 lb 1.6 oz)   SpO2 97%   BMI 26.41 kg/m      "

## 2021-08-20 NOTE — PROGRESS NOTES
Assessment & Plan     Benign essential hypertension  Well-controlled currently.  She reports when she loses weight, she has some lightheadedness which could be lower blood pressure.  Advised that if she does lose weight, suggest checking blood pressures, could stop medication if consistently less than 110.  Recommend schedule future lab along with an INR check  - losartan (COZAAR) 25 MG tablet; Take 1 tablet (25 mg) by mouth daily  - Basic metabolic panel  (Ca, Cl, CO2, Creat, Gluc, K, Na, BUN); Future  - Albumin Random Urine Quantitative with Creat Ratio; Future    Recurrent major depressive disorder, in full remission (H)  Well-controlled, seem to have been primarily related to premenstrual dysphoric disorder.  Since she is doing much better, advised she could try to wean the  Lexapro down to 10 mg gradually, later on could wean off.    Prothrombin gene mutation (H)  Remains on warfarin, follows with hematology once a year, she is currently overdue for that appointment so advised her to schedule it, may possibly change to a DOAC in the future    Premenstrual dysphoric disorder  Well-controlled on medication and progesterone birth control, consider weaning Lexapro as above    Anxiety  Well-controlled, plan as above    Cerebral venous sinus thrombosis  Some residual obstruction, clinically stable, follow-up with hematology        Return in about 1 year (around 8/20/2022) for medication follow up.    Kaitlin Parr MD  [facility]    Keshia Ibarra is a 41 year old who presents for the following health issues     HPI     New Patient/Transfer of Care  She is new to me, [facility] but she [place] here and is working at home so wants to establish out here.    Problems:  1.  Hypertension: She has been on treatment for about 4 years.  Blood pressures well controlled now.  She says her blood pressure does seem to vary with weight changes, when she has lost weight  she has sometimes felt a little lightheadedness with getting up but has not been monitoring her blood pressure during that timeframe.  2.  Premenstrual dysphoric disorder associated with some depression and anxiety: She reports she has done very well, particularly since they added progesterone containing birth control, that is evened out her hormones much better.  She wonders if she needs to stay on the Lexapro or not.  No side effects from either medication.  3.  Heterozygous prothrombin gene mutation with history of cerebral sinus thrombosis: She is currently stable on warfarin.  She follows with hematology yearly, last visit was 7/20.  She has discussed possibility of changing to DOAC but still is concerned because of the cerebral sinus thrombosis that it may not be as effective as the warfarin.    Patient Active Problem List   Diagnosis     Benign neoplasm of skin     Dermatofibroma     Prothrombin gene mutation (H)     Long term current use of anticoagulant therapy     Cerebral venous sinus thrombosis     Benign essential hypertension     Premenstrual dysphoric disorder     Recurrent major depressive disorder, in full remission (H)     Anxiety     Current Outpatient Medications   Medication Sig Dispense Refill     acetaminophen (ACETAMIN) 500 MG tablet Take 2 tablets by mouth every 6 hours as needed.       escitalopram (LEXAPRO) 20 MG tablet Take 1 tablet (20 mg) by mouth daily 90 tablet 1     losartan (COZAAR) 25 MG tablet Take 1 tablet (25 mg) by mouth daily 90 tablet 3     norethindrone (MICRONOR) 0.35 MG tablet Take 1 tablet (0.35 mg) by mouth daily 84 tablet 4     warfarin ANTICOAGULANT (COUMADIN) 2 MG tablet Take 2 to 3 tablets by mouth daily or as directed by the Coumadin clinic. 65 tablet 6      Social History     Tobacco Use     Smoking status: Never Smoker     Smokeless tobacco: Never Used   Substance Use Topics     Alcohol use: Yes     Comment: 2-4 drinks per month     Drug use: No        Review of  "Systems   No fever, chills, no dyspnea on exertion, no chest pain, palpitations, PND, orthopnea, edema, syncope, headache, abdominal pain       Objective    /82 (BP Location: Left arm, Patient Position: Sitting, Cuff Size: Adult Regular)   Pulse 90   Temp 98.2  F (36.8  C) (Oral)   Resp 20   Ht 1.6 m (5' 3\")   Wt 67.6 kg (149 lb 1.6 oz)   LMP 04/20/2021   SpO2 97%   BMI 26.41 kg/m    Body mass index is 26.41 kg/m .     Physical Exam     Lungs: clear  CV: normal S1, S2 without murmur, S3 or S4.   Abdomen: Bowel sounds normal, soft, nontender. No hepatosplenomegaly. No masses.   No edema  Pulses full, no carotid bruits      PHQ 11/1/2019 12/30/2020 8/20/2021   PHQ-9 Total Score 4 8 3   Q9: Thoughts of better off dead/self-harm past 2 weeks Not at all Not at all Not at all     NIKA-7 SCORE 11/1/2019 11/4/2020 8/20/2021   Total Score - 7 (mild anxiety) -   Total Score 4 7 2                     "

## 2021-08-21 ASSESSMENT — ANXIETY QUESTIONNAIRES: GAD7 TOTAL SCORE: 2

## 2021-08-30 ENCOUNTER — TELEPHONE (OUTPATIENT)
Dept: ANTICOAGULATION | Facility: CLINIC | Age: 41
End: 2021-08-30

## 2021-09-01 ENCOUNTER — LAB (OUTPATIENT)
Dept: LAB | Facility: CLINIC | Age: 41
End: 2021-09-01
Payer: COMMERCIAL

## 2021-09-01 ENCOUNTER — ANTICOAGULATION THERAPY VISIT (OUTPATIENT)
Dept: ANTICOAGULATION | Facility: CLINIC | Age: 41
End: 2021-09-01

## 2021-09-01 DIAGNOSIS — I10 BENIGN ESSENTIAL HYPERTENSION: ICD-10-CM

## 2021-09-01 DIAGNOSIS — Z79.01 LONG TERM CURRENT USE OF ANTICOAGULANT THERAPY: Primary | ICD-10-CM

## 2021-09-01 DIAGNOSIS — Z79.01 LONG TERM CURRENT USE OF ANTICOAGULANT THERAPY: ICD-10-CM

## 2021-09-01 DIAGNOSIS — D68.52 PROTHROMBIN GENE MUTATION (H): ICD-10-CM

## 2021-09-01 LAB
ANION GAP SERPL CALCULATED.3IONS-SCNC: 3 MMOL/L (ref 3–14)
BUN SERPL-MCNC: 9 MG/DL (ref 7–30)
CALCIUM SERPL-MCNC: 8.6 MG/DL (ref 8.5–10.1)
CHLORIDE BLD-SCNC: 108 MMOL/L (ref 94–109)
CO2 SERPL-SCNC: 27 MMOL/L (ref 20–32)
CREAT SERPL-MCNC: 0.77 MG/DL (ref 0.52–1.04)
GFR SERPL CREATININE-BSD FRML MDRD: >90 ML/MIN/1.73M2
GLUCOSE BLD-MCNC: 112 MG/DL (ref 70–99)
INR PPP: 1.86 (ref 0.85–1.15)
POTASSIUM BLD-SCNC: 4.2 MMOL/L (ref 3.4–5.3)
SODIUM SERPL-SCNC: 138 MMOL/L (ref 133–144)

## 2021-09-01 PROCEDURE — 85610 PROTHROMBIN TIME: CPT

## 2021-09-01 PROCEDURE — 36415 COLL VENOUS BLD VENIPUNCTURE: CPT

## 2021-09-01 PROCEDURE — 80048 BASIC METABOLIC PNL TOTAL CA: CPT

## 2021-09-01 NOTE — PROGRESS NOTES
ANTICOAGULATION MANAGEMENT     Stacey Maki 41 year old female is on warfarin with subtherapeutic INR result. (Goal INR 2.0-3.0)    Recent labs: (last 7 days)     09/01/21  1559   INR 1.86*       ASSESSMENT     Source(s): Chart Review       Warfarin doses taken: Reviewed in chart    Diet: No new diet changes identified    New illness, injury, or hospitalization: No    Medication/supplement changes: None noted    Signs or symptoms of bleeding or clotting: No    Previous INR: Therapeutic last 2(+) visits    Additional findings: None     PLAN     Recommended plan for no diet, medication or health factor changes affecting INR     Dosing Instructions:  Increase your warfarin dose (6.9% change) with next INR in 2 weeks       Summary  As of 9/1/2021    Full warfarin instructions:  6 mg every Wed; 4 mg all other days   Next INR check:               Detailed voice message left for Stacey with dosing instructions and follow up date.     Contact [phone] to schedule and with any changes, questions or concerns.     Education provided: Please call back if any changes to your diet, medications or how you've been taking warfarin and Contact [phone] with any changes, questions or concerns.     Plan made per [facility] anticoagulation protocol    Leticia Cho RN  Anticoagulation Clinic  9/1/2021    _______________________________________________________________________     Anticoagulation Episode Summary     Current INR goal:  2.0-3.0   TTR:  69.2 % (11 mo)   Target end date:  Indefinite   Send INR reminders to:  [facility]    Indications    Long term current use of anticoagulant therapy [Z79.01]  Prothrombin gene mutation (H) [D68.52]           Comments:  Patient has 2 mg Tablets Patient contact number [phone]  HIPAA INFO OK to leave messages on home or cell phone leave msg on cell [phone]  Goes to [facility] for labs/results do not come to in-basket +++SEND POOL MESSAG    OK to speak byron Potts          Anticoagulation Care Providers     Provider Role Specialty Phone number    Danny Clark MD Referring Hematology [phone]

## 2021-09-14 ENCOUNTER — MYC MEDICAL ADVICE (OUTPATIENT)
Dept: INTERNAL MEDICINE | Facility: CLINIC | Age: 41
End: 2021-09-14

## 2021-09-14 DIAGNOSIS — R73.09 BLOOD GLUCOSE ABNORMAL: Primary | ICD-10-CM

## 2021-09-14 NOTE — TELEPHONE ENCOUNTER
See [employer] message.     Please advise.     Lab Results   Component Value Date     09/01/2021    GLC 82 07/24/2020    GLC 84 12/17/2019

## 2021-09-19 ENCOUNTER — HEALTH MAINTENANCE LETTER (OUTPATIENT)
Age: 41
End: 2021-09-19

## 2021-09-22 ENCOUNTER — TELEPHONE (OUTPATIENT)
Dept: ANTICOAGULATION | Facility: CLINIC | Age: 41
End: 2021-09-22

## 2021-09-22 NOTE — TELEPHONE ENCOUNTER
ANTICOAGULATION     Stacey Maki is overdue for INR check.      Left message for patient to call and schedule lab appointment as soon as possible. If returning call, please schedule.     KAYE GILMAN RN

## 2021-09-27 ENCOUNTER — LAB (OUTPATIENT)
Dept: LAB | Facility: CLINIC | Age: 41
End: 2021-09-27
Payer: COMMERCIAL

## 2021-09-27 ENCOUNTER — MYC MEDICAL ADVICE (OUTPATIENT)
Dept: INTERNAL MEDICINE | Facility: CLINIC | Age: 41
End: 2021-09-27

## 2021-09-27 ENCOUNTER — ANTICOAGULATION THERAPY VISIT (OUTPATIENT)
Dept: ANTICOAGULATION | Facility: CLINIC | Age: 41
End: 2021-09-27

## 2021-09-27 DIAGNOSIS — D68.52 PROTHROMBIN GENE MUTATION (H): ICD-10-CM

## 2021-09-27 DIAGNOSIS — I10 BENIGN ESSENTIAL HYPERTENSION: ICD-10-CM

## 2021-09-27 DIAGNOSIS — Z79.01 LONG TERM CURRENT USE OF ANTICOAGULANT THERAPY: Primary | ICD-10-CM

## 2021-09-27 DIAGNOSIS — Z79.01 LONG TERM CURRENT USE OF ANTICOAGULANT THERAPY: ICD-10-CM

## 2021-09-27 LAB
CREAT UR-MCNC: 30 MG/DL
INR PPP: 2.15 (ref 0.85–1.15)
MICROALBUMIN UR-MCNC: <5 MG/L
MICROALBUMIN/CREAT UR: NORMAL MG/G{CREAT}

## 2021-09-27 PROCEDURE — 85610 PROTHROMBIN TIME: CPT

## 2021-09-27 PROCEDURE — 82043 UR ALBUMIN QUANTITATIVE: CPT

## 2021-09-27 PROCEDURE — 36415 COLL VENOUS BLD VENIPUNCTURE: CPT

## 2021-09-27 NOTE — PROGRESS NOTES
ANTICOAGULATION MANAGEMENT     Stacey Maki 41 year old female is on warfarin with therapeutic INR result. (Goal INR 2.0-3.0)    Recent labs: (last 7 days)     09/27/21  1701   INR 2.15*       ASSESSMENT     Source(s): Chart Review       Warfarin doses taken: Reviewed in chart    Diet: No new diet changes identified    New illness, injury, or hospitalization: No    Medication/supplement changes: None noted    Signs or symptoms of bleeding or clotting: No    Previous INR: Subtherapeutic    Additional findings: None     PLAN     Recommended plan for no diet, medication or health factor changes affecting INR     Dosing Instructions: Continue your current warfarin dose with next INR in 3 weeks       Summary  As of 9/27/2021    Full warfarin instructions:  6 mg every Wed; 4 mg all other days   Next INR check:               Detailed voice message left for Stacey with dosing instructions and follow up date.     Contact [phone] to schedule and with any changes, questions or concerns.     Education provided: Please call back if any changes to your diet, medications or how you've been taking warfarin and Contact [phone] with any changes, questions or concerns.     Plan made per [facility] anticoagulation protocol    Leticia Cho RN  Anticoagulation Clinic  9/27/2021    _______________________________________________________________________     Anticoagulation Episode Summary     Current INR goal:  2.0-3.0   TTR:  67.9 % (11.9 mo)   Target end date:  Indefinite   Send INR reminders to:  [facility]    Indications    Long term current use of anticoagulant therapy [Z79.01]  Prothrombin gene mutation (H) [D68.52]           Comments:  Patient has 2 mg Tablets Patient contact number [phone]  HIPAA INFO OK to leave messages on home or cell phone leave msg on cell [phone]  Goes to [facility] for labs/results do not come to in-basket +++SEND POOL MESSAG    OK to speak byron Potts          Anticoagulation Care Providers     Provider Role Specialty Phone number    Danny Clark MD Referring Hematology [phone]

## 2021-10-28 ENCOUNTER — LAB (OUTPATIENT)
Dept: LAB | Facility: CLINIC | Age: 41
End: 2021-10-28
Payer: COMMERCIAL

## 2021-10-28 DIAGNOSIS — Z79.01 LONG TERM CURRENT USE OF ANTICOAGULANT THERAPY: ICD-10-CM

## 2021-10-28 LAB — INR PPP: 2.39 (ref 0.85–1.15)

## 2021-10-28 PROCEDURE — 36415 COLL VENOUS BLD VENIPUNCTURE: CPT

## 2021-10-28 PROCEDURE — 85610 PROTHROMBIN TIME: CPT

## 2021-10-29 ENCOUNTER — ANTICOAGULATION THERAPY VISIT (OUTPATIENT)
Dept: ANTICOAGULATION | Facility: CLINIC | Age: 41
End: 2021-10-29

## 2021-10-29 DIAGNOSIS — Z79.01 LONG TERM CURRENT USE OF ANTICOAGULANT THERAPY: Primary | ICD-10-CM

## 2021-10-29 DIAGNOSIS — D68.52 PROTHROMBIN GENE MUTATION (H): ICD-10-CM

## 2021-10-29 NOTE — PROGRESS NOTES
ANTICOAGULATION MANAGEMENT     Stacey Maki 41 year old female is on warfarin with therapeutic INR result. (Goal INR 2.0-3.0)    Recent labs: (last 7 days)     10/28/21  1825   INR 2.39*       ASSESSMENT     Source(s): Chart Review       Warfarin doses taken: Reviewed in chart    Diet: No new diet changes identified    New illness, injury, or hospitalization: No    Medication/supplement changes: None noted    Signs or symptoms of bleeding or clotting: No    Previous INR: Therapeutic last 2(+) visits    Additional findings: None     PLAN     Recommended plan for no diet, medication or health factor changes affecting INR     Dosing Instructions: Continue your current warfarin dose with next INR in 4 weeks       Summary  As of 10/29/2021    Full warfarin instructions:  6 mg every Wed; 4 mg all other days   Next INR check:  11/26/2021             Detailed voice message left for Stacey with dosing instructions and follow up date.     Contact [phone] to schedule and with any changes, questions or concerns.     Education provided: Please call back if any changes to your diet, medications or how you've been taking warfarin and Contact [phone] with any changes, questions or concerns.     Plan made per [facility] anticoagulation protocol    Leticia Cho RN  Anticoagulation Clinic  10/29/2021    _______________________________________________________________________     Anticoagulation Episode Summary     Current INR goal:  2.0-3.0   TTR:  74.8 % (1 y)   Target end date:  Indefinite   Send INR reminders to:  [facility]    Indications    Long term current use of anticoagulant therapy [Z79.01]  Prothrombin gene mutation (H) [D68.52]           Comments:  Patient has 2 mg Tablets Patient contact number [phone]  HIPAA INFO OK to leave messages on home or cell phone leave msg on cell [phone]  Goes to [facility] for labs/results do not come to in-basket +++SEND POOL MESSAG    OK to speak byron Potts          Anticoagulation Care Providers     Provider Role Specialty Phone number    Danny Clark MD Referring Hematology [phone]

## 2021-11-12 ENCOUNTER — MYC MEDICAL ADVICE (OUTPATIENT)
Dept: INTERNAL MEDICINE | Facility: CLINIC | Age: 41
End: 2021-11-12
Payer: COMMERCIAL

## 2021-12-07 ENCOUNTER — TELEPHONE (OUTPATIENT)
Dept: ANTICOAGULATION | Facility: CLINIC | Age: 41
End: 2021-12-07
Payer: COMMERCIAL

## 2021-12-07 NOTE — TELEPHONE ENCOUNTER
ANTICOAGULATION     Stacey Maki is overdue for INR check.      Left message for patient to call and schedule lab appointment as soon as possible. If returning call, please schedule.     Praveena Blackwood RN

## 2021-12-09 ENCOUNTER — ANTICOAGULATION THERAPY VISIT (OUTPATIENT)
Dept: ANTICOAGULATION | Facility: CLINIC | Age: 41
End: 2021-12-09

## 2021-12-09 ENCOUNTER — LAB (OUTPATIENT)
Dept: LAB | Facility: CLINIC | Age: 41
End: 2021-12-09
Payer: COMMERCIAL

## 2021-12-09 DIAGNOSIS — Z79.01 LONG TERM CURRENT USE OF ANTICOAGULANT THERAPY: Primary | ICD-10-CM

## 2021-12-09 DIAGNOSIS — D68.52 PROTHROMBIN GENE MUTATION (H): ICD-10-CM

## 2021-12-09 DIAGNOSIS — Z79.01 LONG TERM CURRENT USE OF ANTICOAGULANT THERAPY: ICD-10-CM

## 2021-12-09 LAB — INR PPP: 2.57 (ref 0.85–1.15)

## 2021-12-09 PROCEDURE — 36415 COLL VENOUS BLD VENIPUNCTURE: CPT

## 2021-12-09 PROCEDURE — 85610 PROTHROMBIN TIME: CPT

## 2021-12-09 NOTE — PROGRESS NOTES
ANTICOAGULATION MANAGEMENT     Stacye Maki 41 year old female is on warfarin with therapeutic INR result. (Goal INR 2.0-3.0)    Recent labs: (last 7 days)     12/09/21  1622   INR 2.57*       ASSESSMENT     Source(s): Chart Review       Warfarin doses taken: Reviewed in chart    Diet: No new diet changes identified    New illness, injury, or hospitalization: No    Medication/supplement changes: None noted    Signs or symptoms of bleeding or clotting: No    Previous INR: Therapeutic last 2(+) visits    Additional findings: None     PLAN     Recommended plan for no diet, medication or health factor changes affecting INR     Dosing Instructions: Continue your current warfarin dose with next INR in 4 weeks       Summary  As of 12/9/2021    Full warfarin instructions:  6 mg every Wed; 4 mg all other days   Next INR check:  1/6/2022             Detailed voice message left for Stacey with dosing instructions and follow up date.     Contact [phone] to schedule and with any changes, questions or concerns.     Education provided: Please call back if any changes to your diet, medications or how you've been taking warfarin and Contact [phone] with any changes, questions or concerns.     Plan made per [facility] anticoagulation protocol    Leticia Cho, RN  Anticoagulation Clinic  12/9/2021    _______________________________________________________________________     Anticoagulation Episode Summary     Current INR goal:  2.0-3.0   TTR:  77.9 % (1 y)   Target end date:  Indefinite   Send INR reminders to:  [facility]    Indications    Long term current use of anticoagulant therapy [Z79.01]  Prothrombin gene mutation (H) [D68.52]           Comments:  Patient has 2 mg Tablets Patient contact number [phone]  HIPAA INFO OK to leave messages on home or cell phone leave msg on cell [phone]  Goes to [facility] for labs/results do not come to in-basket +++SEND POOL MESSAG    OK to speak byron Potts          Anticoagulation Care Providers     Provider Role Specialty Phone number    Danny Clark MD Referring Hematology [phone]

## 2021-12-28 ENCOUNTER — OFFICE VISIT (OUTPATIENT)
Dept: OBGYN | Facility: CLINIC | Age: 41
End: 2021-12-28
Payer: COMMERCIAL

## 2021-12-28 VITALS
BODY MASS INDEX: 27.8 KG/M2 | DIASTOLIC BLOOD PRESSURE: 88 MMHG | SYSTOLIC BLOOD PRESSURE: 120 MMHG | WEIGHT: 156.9 LBS | HEIGHT: 63 IN

## 2021-12-28 DIAGNOSIS — I10 BENIGN ESSENTIAL HYPERTENSION: ICD-10-CM

## 2021-12-28 DIAGNOSIS — Z30.09 ENCOUNTER FOR COUNSELING REGARDING CONTRACEPTION: ICD-10-CM

## 2021-12-28 DIAGNOSIS — D68.52 PROTHROMBIN GENE MUTATION (H): ICD-10-CM

## 2021-12-28 DIAGNOSIS — Z01.411 ENCOUNTER FOR GYNECOLOGICAL EXAMINATION WITH ABNORMAL FINDING: Primary | ICD-10-CM

## 2021-12-28 DIAGNOSIS — F32.81 PREMENSTRUAL DYSPHORIC DISORDER: ICD-10-CM

## 2021-12-28 PROCEDURE — 99386 PREV VISIT NEW AGE 40-64: CPT | Performed by: ADVANCED PRACTICE MIDWIFE

## 2021-12-28 PROCEDURE — 99213 OFFICE O/P EST LOW 20 MIN: CPT | Mod: 25 | Performed by: ADVANCED PRACTICE MIDWIFE

## 2021-12-28 ASSESSMENT — PATIENT HEALTH QUESTIONNAIRE - PHQ9: SUM OF ALL RESPONSES TO PHQ QUESTIONS 1-9: 13

## 2021-12-28 ASSESSMENT — MIFFLIN-ST. JEOR: SCORE: 1335.82

## 2021-12-28 NOTE — PATIENT INSTRUCTIONS
& Reproductive Mental Health Resources-- [facility]    **Not a complete list / we do not know all of the providers personally. If you have other resources or providers you would recommend, let us know! If you have concerns or feedback, please be in touch with any of our midwives.      Pregnancy and Postpartum Support [place]  -[facility] line  -Find a Provider  -Resources  -Virtual Support Group    [email or web address]      [facility] Reproductive and  Mental Health  -Individual therapy  -In person, tele-therapy, walk & talk therapy    [email or web address]      [facility]   -Individual therapy  -Intensive outpatient support program (Kim Elliott)    [email or web address]      Mother-Baby Program-- [facility]  -Psychiatrists who specialize in OBGYN-related care  -Individual therapy  -Group therapy / Day program    [email or web address]  290-207-MUNG        Preventive Health Recommendations  Female Ages 40-50    Yearly exam:     See your health care provider every year in order to  1. Review health changes   2. Discuss preventive care    3. Review your medicines if your provider prescribed any      Get a Pap test every five years with co-testing for HPV (unless you have an abnormal result and your provider advises testing more often)       You do not need a Pap test if your uterus was removed (hysterectomy) and you have not had cancer      You should be tested each year for STD's (sexually transmitted diseases) if you are at risk      Talk with your provider about starting mammogram screenings for breast cancer and how often you should be screened      Have a cholesterol test every 3-5 years       Have a diabetes test (fasting glucose) after age 45. If you are at risk for diabetes, you should have this test every  3 years      You may begin to experience some changes in your menstrual cycle as you age, you may also begin to have some symptoms of perimenopause such as hot flashes. Women typically go through menopause anytime between 45-55 years of age.    Shots: Get a flu shot each year. Get a tetanus shot every 10 years.     Nutrition:     Eat at least 5 servings of fruits and vegetables each day    Eat REAL food, stay away from processed foods    Eat whole-grain bread, whole-wheat pasta and brown rice instead of white grains and rice    For bone health:  Eat calcium-rich foods or take calcium pills (500 to 600 mg) twice a day with food (1200 mg per day). Also take vitamin D (1,000-3,000 IUs) each day.     Lifestyle    Exercise at least 150 minutes a week (an average of 30 minutes a day, 5 days a week). This will help you control your weight and prevent disease.    Limit alcohol to one drink per day    No smoking     Wear sunscreen to prevent skin cancer    See your dentist every six months for an exam and cleaning    Weight bearing exercise to encourage good bone health prevent osteoporosis

## 2021-12-28 NOTE — PROGRESS NOTES
Stacey is a 41 year old  female who presents for annual exam.     Besides routine health maintenance,  she would like to discuss PMDD and possibly switching her birth control method.  She states her depression is primarily for several days prior to her period and during her period. Cannot take estrogen d/t hx clotting. She was put on a progesterone-only pill last year which did help her mood tremendously at first, but now she feels that her mood is back to how it was.   Wondering if mood issues right now are situation based - has complex relationship with children/ with regards to how to parent children. Lots of conflict between her and her partner. She has had trouble connecting with therapists in the past, but would like to try reaching out to one again. She also takes 20mg Lexapro which she does feel helps her mood.   States after starting progesterone-only pill,  periods became less frequent, which means her symptoms of depression are also less frequent.  Now she only gets a period once every few months. Her symptoms of depression are worse at time of menstruation. She would love to avoid having periods entirely, as she feels this would help her mood.   HPI:    Menses are irregular, every few months and variable lasting 10 days.   Menses flow: normal.  Has variable light/heavy/spotty bleeding during 10 days  Patient's last menstrual period was 2021 (exact date)..   Using POP and partner has vasectomy for contraception.    She is not currently considering pregnancy.    REPRODUCTIVE/GYNECOLOGIC HISTORY:  Stacey is sexually active with male partner(s) and is currently in monogamous relationship.   STI testing offered?  Declined  History of abnormal Pap smear:  No  SOCIAL HISTORY  Abuse: does not report having previously been physical or sexually abused.    Do you feel safe in your environment? YES       She  reports that she has never smoked. She has never used smokeless tobacco.      Last PHQ-9  score on record =   PHQ-9 SCORE 2021   PHQ-9 Total Score -   PHQ-9 Total Score 13     Last GAD7 score on record =   NIKA-7 SCORE 2021   Total Score -   Total Score 2     Alcohol Score = 9-12 drinks/week    HEALTH MAINTENANCE:  Cholesterol: (  Cholesterol   Date Value Ref Range Status   2020 194 <200 mg/dL Final   10/10/2017 202 (H) <200 mg/dL Final     Comment:     Desirable:       <200 mg/dl    History of abnormal lipids: No  Mammo: scheduled 2021  Regular Self Breast Exams: Yes  TSH: (  TSH   Date Value Ref Range Status   10/10/2017 1.73 0.40 - 4.00 mU/L Final    )  Pap; (  Lab Results   Component Value Date    PAP NIL 2019    PAP NIL 2014    PAP NIL 2011    )  Immunizations up to date: declined TDAP  (Gardasil, Tdap, Flu)      HEALTHY HABITS  Eating habits: eats regular meals and follows a balanced nutrition diet  Calcium/Vitamin D intake: source:  dairy Adequate?   Exercise: How often do you exercise? None now, but has [employer] membership.   Have you had an eye exam in the last two years? YES    Do you routinely see the dentist once or twice yearly? YES        HISTORY:  OB History    Para Term  AB Living   2 2 2 0 0 2   SAB IAB Ectopic Multiple Live Births   0 0 0 0 2      # Outcome Date GA Lbr Sam/2nd Weight Sex Delivery Anes PTL Lv   2 Term 11/18/10 38w0d  2.92 kg (6 lb 7 oz) M CS-LTranv Gen  DONN      Birth Comments: CAN, blood clot in brain, 5wks ,       Name: Derek Vasquez Term 06 40w5d  2.948 kg (6 lb 8 oz) M CS-LTranv   DONN      Birth Comments: dil to 10, FTD, mec.distress after AROM true knot       Name: Tim     Past Medical History:   Diagnosis Date     Arteriovenous malformation      Asthma      Chronic cerebral venous sinus thrombosis     diagnosed in , 2009 5wk preg.      Heart disease     mother     Hypertension 2016    mother     Idiopathic intracranial hypertension      Papilledema      Prothrombin gene mutation (H)     heterozygous type      Tension headache      Past Surgical History:   Procedure Laterality Date     cerebral angiogram      to discuss management of neural- based fistula      SECTION       ENT SURGERY  1983    adnoids removed/tubes in ears at age 3     GYN SURGERY      C section x 2     IR INTRACRANIAL EMBOLIZATION RIGHT  2011     MRI last Nov.      NO HISTORY OF SURGERY  3/20/14    derm     VASCULAR SURGERY      fistula repaired in brain     Family History   Problem Relation Age of Onset     Cerebrovascular Disease Paternal Grandmother      Cancer - colorectal Paternal Grandfather      Colon Cancer Paternal Grandfather      Alcohol/Drug Father      Substance Abuse Father      Alcohol/Drug Maternal Grandmother      Cerebrovascular Disease Other         paternal uncle     Anxiety Disorder Mother      Depression Mother      Obesity Mother      Hypertension Mother      Breast Cancer Mother 62        Stage 0     Cancer No family hx of         no skin cancer     Skin Cancer No family hx of      Melanoma No family hx of      Social History     Socioeconomic History     Marital status:      Spouse name: Edson     Number of children: 2     Years of education: 18     Highest education level: None   Occupational History     Employer: [employer]     Comment:    Tobacco Use     Smoking status: Never Smoker     Smokeless tobacco: Never Used   Vaping Use     Vaping Use: Never used   Substance and Sexual Activity     Alcohol use: Yes     Comment: 2-4 drinks per month     Drug use: No     Sexual activity: Yes     Partners: Male     Birth control/protection: Male Surgical, Pill     Comment: vasectomy   Other Topics Concern     Parent/sibling w/ CABG, MI or angioplasty before 65F 55M? Not Asked      Service No     Blood Transfusions No     Caffeine Concern No     Comment: magdy tea in am     Occupational Exposure No     Hobby Hazards No     Sleep Concern No     Stress Concern No     Weight Concern  No     Special Diet No     Back Care No     Exercise No     Bike Helmet No     Seat Belt No     Self-Exams No   Social History Narrative    How much exercise per week? Walks 5 days a weekHow much calcium per day? Eats a lot of dairy products daily. 3-4 times   How much caffeine per day? 1 cup a dayHow much vitamin D per day? DietDo you/your family wear seatbelts?  YesDo you/your family use safety helmets? YesDo you/your family use sunscreen? YesDo you/your family keep firearms in the home? NoDo you/your family have a smoke detector(s)? YesDo you feel safe in your home? YesHas anyone ever touched you in an unwanted manner? No    Renetta Shaikh CMA    10/05/2015        Reviewed [facility] 11-5-20     Social Determinants of Health     Financial Resource Strain: Not on file   Food Insecurity: Not on file   Transportation Needs: Not on file   Physical Activity: Not on file   Stress: Not on file   Social Connections: Not on file   Intimate Partner Violence: Not on file   Housing Stability: Not on file       Current Outpatient Medications:      acetaminophen (ACETAMIN) 500 MG tablet, Take 2 tablets by mouth every 6 hours as needed., Disp: , Rfl:      escitalopram (LEXAPRO) 20 MG tablet, Take 1 tablet (20 mg) by mouth daily, Disp: 90 tablet, Rfl: 1     losartan (COZAAR) 25 MG tablet, Take 1 tablet (25 mg) by mouth daily, Disp: 90 tablet, Rfl: 3     norethindrone (MICRONOR) 0.35 MG tablet, Take 1 tablet (0.35 mg) by mouth daily, Disp: 84 tablet, Rfl: 4     warfarin ANTICOAGULANT (COUMADIN) 2 MG tablet, TAKE 2 TO 3 TABLETS BY MOUTH DAILY AS DIRECTED, Disp: 65 tablet, Rfl: 3     Allergies   Allergen Reactions     Benzoyl Peroxide Swelling     Swollen eyelids     Seasonal Allergies Other (See Comments)     Runny nose, itchy eyes, breathing issues, and fatigue     Adhesive Tape Dermatitis       Past medical, surgical, social and family history were reviewed and updated in [facility].    ROS:   CONSTITUTIONAL: NEGATIVE for fever,  "chills, change in weight  INTEGUMENTARU/SKIN: NEGATIVE for worrisome rashes, moles or lesions  EYES: NEGATIVE for vision changes or irritation  ENT: NEGATIVE for ear, mouth and throat problems  RESP: NEGATIVE for significant cough or SOB  BREAST: NEGATIVE for masses, tenderness or discharge  CV: NEGATIVE for chest pain, palpitations or peripheral edema  GI: NEGATIVE for nausea, abdominal pain, heartburn, or change in bowel habits  : NEGATIVE for unusual urinary or vaginal symptoms. Periods are regular.  MUSCULOSKELETAL: NEGATIVE for significant arthralgias or myalgia  NEURO: NEGATIVE for weakness, dizziness or paresthesias  PSYCHIATRIC: positive for mood changes     PHYSICAL EXAM:  /88 (BP Location: Left arm, Cuff Size: Adult Regular)   Ht 1.6 m (5' 3\")   Wt 71.2 kg (156 lb 14.4 oz)   LMP 12/03/2021 (Exact Date)   Breastfeeding No   BMI 27.79 kg/m     BMI: Body mass index is 27.79 kg/m .  Constitutional: healthy, alert and no distress  Neck: symmetrical, thyroid normal size, no masses present, no lymphadenopathy present.   Cardiovascular: RRR, no murmurs present  Respiratory: breathing unlabored, lungs CTA bilaterally  Breast:normal without masses, tenderness or nipple discharge and no palpable axillary masses or adenopathy  Gastrointestinal: abdomen soft, non-tender, bowel sounds present  PELVIC EXAM:  deferred    ASSESSMENT/PLAN:  1. (Z01.411) Encounter for gynecological examination with abnormal finding  (primary encounter diagnosis)      2. (I10) Benign essential hypertension  - On Losartan, stable, managed by IM    3. (D68.52) Prothrombin gene mutation (H)  - On warfarin, follow with Hematology annually, next appt 1/6/22    4. (F32.81) Premenstrual dysphoric disorder  Plan: norethindrone (MICRONOR) 0.35 MG tablet  - Feels that she wants to continue Lexapro, and try adding therapy to see if that helps with her symptoms that she feels are mainly related to family stress at this point.   - Provided " with [place] options    5. (Z30.09) Encounter for counseling regarding contraception  - Feels she initially got better relief from her PMDD with POPs, but has been getting periods every few months which causes her PMDD symptoms to flair. She is considering a Mirena IUD, because she is hoping this would decrease the amount of periods she has even more. We reviewed that her bleeding pattern could be similar to how it is on POP, but she does admit she will sometimes miss pills or take them late and this is what she feels triggers her periods. Discussed benefit of Mirena providing a more steady dose of progesterone.   - Due to her clotting disorder, she would not be a candidate for any estrogen-containing birth control. If she desires to try a Mirena, recommend she follow up with a physician given her clotting disorder and long term use of warfarin to determine if this would be an appropriate method. [facility] category 2.       COUNSELING:   Reviewed preventive health counseling, as reflected in patient instructions   reports that she has never smoked. She has never used smokeless tobacco.      JORDAN Arias, DESTINY

## 2021-12-28 NOTE — NURSING NOTE
"Chief Complaint   Patient presents with     Women's Health     Last pap 2019 with results of NIL and HPV negative     Contraception     Discuss birth control and depression. Takes to help with depression.        Initial /88 (BP Location: Left arm, Cuff Size: Adult Regular)   Ht 1.6 m (5' 3\")   Wt 71.2 kg (156 lb 14.4 oz)   LMP 2021 (Exact Date)   Breastfeeding No   BMI 27.79 kg/m   Estimated body mass index is 27.79 kg/m  as calculated from the following:    Height as of this encounter: 1.6 m (5' 3\").    Weight as of this encounter: 71.2 kg (156 lb 14.4 oz).  BP completed using cuff size: regular    Questioned patient about current smoking habits.  Pt. has never smoked.                   "

## 2021-12-29 RX ORDER — ACETAMINOPHEN AND CODEINE PHOSPHATE 120; 12 MG/5ML; MG/5ML
0.35 SOLUTION ORAL DAILY
Qty: 84 TABLET | Refills: 4 | Status: SHIPPED | OUTPATIENT
Start: 2021-12-29 | End: 2023-01-17

## 2021-12-31 ENCOUNTER — HOSPITAL ENCOUNTER (OUTPATIENT)
Dept: MAMMOGRAPHY | Facility: CLINIC | Age: 41
Discharge: HOME | End: 2021-12-31
Attending: INTERNAL MEDICINE | Admitting: INTERNAL MEDICINE
Payer: COMMERCIAL

## 2021-12-31 DIAGNOSIS — Z12.31 VISIT FOR SCREENING MAMMOGRAM: ICD-10-CM

## 2021-12-31 PROCEDURE — 77067 SCR MAMMO BI INCL CAD: CPT

## 2022-01-03 ENCOUNTER — MYC MEDICAL ADVICE (OUTPATIENT)
Dept: INTERNAL MEDICINE | Facility: CLINIC | Age: 42
End: 2022-01-03
Payer: COMMERCIAL

## 2022-01-03 DIAGNOSIS — F32.A DEPRESSION, UNSPECIFIED DEPRESSION TYPE: ICD-10-CM

## 2022-01-03 DIAGNOSIS — F41.1 GAD (GENERALIZED ANXIETY DISORDER): ICD-10-CM

## 2022-01-03 DIAGNOSIS — I10 BENIGN ESSENTIAL HYPERTENSION: ICD-10-CM

## 2022-01-05 ENCOUNTER — MYC MEDICAL ADVICE (OUTPATIENT)
Dept: OBGYN | Facility: CLINIC | Age: 42
End: 2022-01-05
Payer: COMMERCIAL

## 2022-01-05 DIAGNOSIS — F32.A DEPRESSION, UNSPECIFIED DEPRESSION TYPE: ICD-10-CM

## 2022-01-05 DIAGNOSIS — F41.1 GAD (GENERALIZED ANXIETY DISORDER): ICD-10-CM

## 2022-01-05 RX ORDER — ESCITALOPRAM OXALATE 20 MG/1
TABLET ORAL
Qty: 90 TABLET | Refills: 1 | OUTPATIENT
Start: 2022-01-05

## 2022-01-05 RX ORDER — ESCITALOPRAM OXALATE 20 MG/1
20 TABLET ORAL DAILY
Qty: 90 TABLET | Refills: 1 | Status: SHIPPED | OUTPATIENT
Start: 2022-01-05 | End: 2022-07-08

## 2022-01-05 RX ORDER — ESCITALOPRAM OXALATE 20 MG/1
20 TABLET ORAL DAILY
Qty: 90 TABLET | Refills: 1 | Status: CANCELLED | OUTPATIENT
Start: 2022-01-05

## 2022-01-05 NOTE — TELEPHONE ENCOUNTER
Please see my chart message regarding refill.  Med entered. Pharmacy selected.  Lizzie Powell, RN

## 2022-01-05 NOTE — TELEPHONE ENCOUNTER
ESCITALOPRAM 20MG TABLETS    escitalopram (LEXAPRO) 20 MG tablet 90 tablet 1 1/5/2022  No   Sig - Route: Take 1 tablet (20 mg) by mouth daily - Oral   Sent to pharmacy as: Escitalopram Oxalate 20 MG Oral Tablet (LEXAPRO)   Class: E-Prescribe   Order: [id number]   E-Prescribing Status: Receipt confirmed by pharmacy (1/5/2022 11:31 AM CST)       Printout Tracking    External Result Report     Pharmacy    [id number] - [facility] [address] [place] [address] E AT [facility] [address]

## 2022-01-05 NOTE — TELEPHONE ENCOUNTER
See her Xfire message. She also sent refill request to her OB  Not sure who should be refill this?

## 2022-01-06 ENCOUNTER — VIRTUAL VISIT (OUTPATIENT)
Dept: HEMATOLOGY | Facility: CLINIC | Age: 42
End: 2022-01-06
Attending: PHYSICIAN ASSISTANT
Payer: COMMERCIAL

## 2022-01-06 DIAGNOSIS — D68.52 PROTHROMBIN GENE MUTATION (H): ICD-10-CM

## 2022-01-06 DIAGNOSIS — Z86.718 HISTORY OF CEREBRAL VENOUS SINUS THROMBOSIS: ICD-10-CM

## 2022-01-06 DIAGNOSIS — Z79.01 LONG TERM CURRENT USE OF ANTICOAGULANT THERAPY: Primary | ICD-10-CM

## 2022-01-06 PROCEDURE — 99212 OFFICE O/P EST SF 10 MIN: CPT | Mod: 95 | Performed by: PHYSICIAN ASSISTANT

## 2022-01-06 NOTE — PROGRESS NOTES
[facility]  [address] [place] [address] [address], [place] [address]  Main: [phone], Fax: [phone]    Patient seen at: [facility] at [address] [place] [address]    Video Virtual Visit Note:    Patient: Stacey Maki  MRN: [id number]  : 1980  MARTA: 2022    Due to the ongoing COVID-19 outbreak, this visit was conducted by video, with the patient's approval.      Reason of today's visit:  History of cerebral venous sinus thrombosis on long term anticoagulation therapy. Here for her routine annual follow up clinic visit.      Clinical History Summary:  Stacey Maki is a 40-year-old woman with a history of cerebral venous sinus thrombosis, currently on indefinite anticoagulation therapy with Coumadin, participates in today's scheduled video visit for her routine annual follow up visit. She was last seen by this writer back in 2020.    Thrombosis History Summary:    Estrogen provoked cerebral venous sinous thrombosis in . Found to have heterozygous prothrombin gene mutation.     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 Follow[facility]. According to Dr. Barragan's note on 2013, she has chronic left sigmoid sinus occlusive and occlusion in the upper part of the left internal jugular vein. Additionally, there is a superficial vein that is now draining this sinus. She dose have flow through the right sigmoid sinus, but there are areas of stenosis and irregularity, including an area of stenosis in the high right jugular vein.    She has been on indefinite anticoagulation therapy with warfarin since diagnosis of cerebral sinous thrombosis with INR goal range of 2.0-3.0.      Interim History:  She denies any issues with bleeding complications. She has been on progesterone only  containing OCP for PMDD symptoms and does help. She does reports heavy vaginal bleeding every 2-3 months as her menses. She denies any frequent epistaxis. Denies any bruising. Denies any oral mucosal bleeding. Denies any hematuria or blood in stools.     Received Jass and Jass COVID-19 vaccine back in April 2021. Then her and her  had COVID-19 infection in Nov 2021 with mild symptoms. She has not received her COVID-19 booster vaccine as of yet.     ROS:  As above.     Medication, Allergies and PmHx:  All have been reviewed by this writer in the electronic medical records.    Social History and Family History:  Deferred.    Objective:  Pleasant in no acute distress.  Visual Examination via Video:  Complete exam is not performed today.     Labs:  Component      Latest Ref Rng & Units 1/11/2021 3/3/2021 3/25/2021 4/23/2021   INR      0.85 - 1.15 2.25 (H) 3.40 (H) 2.25 (H) 3.44 (H)     Component      Latest Ref Rng & Units 5/11/2021 6/10/2021 7/21/2021 9/1/2021   INR      0.85 - 1.15 3.15 (H) 2.40 (H) 2.59 (H) 1.86 (H)     Component      Latest Ref Rng & Units 9/27/2021 10/28/2021 12/9/2021   INR      0.85 - 1.15 2.15 (H) 2.39 (H) 2.57 (H)     Assessment:  In summary, Stacey is a 41 year old female with a history of cerebral sinus thrombosis back in 2007 that was estrogen provoked who has been on indefinite anticoagulation therapy with Coumadin after she was found to have abnormal cerebral venous flow, participates in today's scheduled video visit for her routine annual follow up visit in regard to chronic anticoagulation therapy use. Her last visit with this center was July 2020.     Stacey has remain on Coumadin as her mainstay of indefinite anticoagulation therapy with her goal INR of 2.0-3.0. She has done very well with this and has no issues with bleeding comlications. She is not interested in switching anticoagulation therapy to any direct oral anticoagulant agents as having be able to get INR testing  done gives her reassurance.      Diagnosis:  1. History of Cerebral Sinus Thrombosis.  2. Heterozygous Prothrombin Gene Mutation.  3. On chronic anticoagulation therapy with Coumadin.     Plan:  Doing well on warfarin. She remains to be a good candidate to stay on indefinite anticoagulation therapy. Her goal INR remains to be at 2.0-3.0.     I will have her get a CBC with her next INR check (which is supposed to be today).    Otherwise, she knows to contact our clinic if she should experience any unusual bleeding complications or if she should need any invasive or surgical procedures in the future.     I will plan on seeing her back in one year time for follow up visit.     16 minutes spent on the date of the encounter doing chart review, history and exam, documentation and further activities per the note     Video-Visit Details:  Type of service:  Video Visit  Video Start Time: 12:00pm  Video End Time (time video stopped): 12:13pm  Originating Location (pt. Location): Home  Distant Location (provider location):  [facility]   Mode of Communication:  Video Conference via Santhosh Albert PA-C, MPAS  Physician Assistant  [facility].

## 2022-01-06 NOTE — PATIENT INSTRUCTIONS
Stacey,    It was nice to see you via video visit today.    Below is a summary of our plan:  1. Continue warfarin as your mainstay of anticoagulation therapy with your INR goal of 2.0-3.0.  2. Will check your complete blood count with the next INR check.  3. If you should experience any unusual bleeding issues or if you should need any invasive or surgical procedures in the future, please contact us at [phone] and ask to speak to a nursing staff.  4. One of the administrative assistants will contact you to schedule a return visit with us in one year.     Thank you once again in choosing our clinic as part of your healthcare team.      Norman Albert PA-C, MPAS  Physician Assistant  [facility].

## 2022-01-14 ENCOUNTER — ANTICOAGULATION THERAPY VISIT (OUTPATIENT)
Dept: ANTICOAGULATION | Facility: CLINIC | Age: 42
End: 2022-01-14

## 2022-01-14 ENCOUNTER — LAB (OUTPATIENT)
Dept: LAB | Facility: CLINIC | Age: 42
End: 2022-01-14
Payer: COMMERCIAL

## 2022-01-14 DIAGNOSIS — Z86.718 HISTORY OF CEREBRAL VENOUS SINUS THROMBOSIS: ICD-10-CM

## 2022-01-14 DIAGNOSIS — Z79.01 LONG TERM CURRENT USE OF ANTICOAGULANT THERAPY: Primary | ICD-10-CM

## 2022-01-14 DIAGNOSIS — D68.52 PROTHROMBIN GENE MUTATION (H): ICD-10-CM

## 2022-01-14 DIAGNOSIS — Z79.01 LONG TERM CURRENT USE OF ANTICOAGULANT THERAPY: ICD-10-CM

## 2022-01-14 LAB
ERYTHROCYTE [DISTWIDTH] IN BLOOD BY AUTOMATED COUNT: 14.5 % (ref 10–15)
HCT VFR BLD AUTO: 42.7 % (ref 35–47)
HGB BLD-MCNC: 13.9 G/DL (ref 11.7–15.7)
INR PPP: 1.91 (ref 0.85–1.15)
MCH RBC QN AUTO: 30.8 PG (ref 26.5–33)
MCHC RBC AUTO-ENTMCNC: 32.6 G/DL (ref 31.5–36.5)
MCV RBC AUTO: 95 FL (ref 78–100)
PLATELET # BLD AUTO: 223 10E3/UL (ref 150–450)
RBC # BLD AUTO: 4.51 10E6/UL (ref 3.8–5.2)
WBC # BLD AUTO: 7.6 10E3/UL (ref 4–11)

## 2022-01-14 PROCEDURE — 85027 COMPLETE CBC AUTOMATED: CPT

## 2022-01-14 PROCEDURE — 36415 COLL VENOUS BLD VENIPUNCTURE: CPT

## 2022-01-14 PROCEDURE — 85610 PROTHROMBIN TIME: CPT

## 2022-01-14 NOTE — PROGRESS NOTES
ANTICOAGULATION MANAGEMENT     Stacey Maki 41 year old female is on warfarin with subtherapeutic INR result. (Goal INR 2.0-3.0)    Recent labs: (last 7 days)     01/14/22  1546   INR 1.91*       ASSESSMENT     Source(s): Chart Review and Patient/Caregiver Call       Warfarin doses taken: Warfarin taken as instructed    Diet: Increased greens/vitamin K in diet; ongoing change    New illness, injury, or hospitalization: No    Medication/supplement changes: None noted    Signs or symptoms of bleeding or clotting: No    Previous INR: Therapeutic last 2(+) visits    Additional findings: None     PLAN     Recommended plan for ongoing change(s) affecting INR     Dosing Instructions:  Increase your warfarin dose (6.7% change) with next INR in 2 weeks       Summary  As of 1/14/2022    Full warfarin instructions:  6 mg every Tue, Fri; 4 mg all other days   Next INR check:  1/28/2022             Telephone call with Stacey who agrees to plan and repeated back plan correctly    Patient offered & declined to schedule next visit    Education provided: Please call back if any changes to your diet, medications or how you've been taking warfarin and Contact [phone] with any changes, questions or concerns.     Plan made per [facility] anticoagulation protocol    Merced Cadet RN  Anticoagulation Clinic  1/14/2022    _______________________________________________________________________     Anticoagulation Episode Summary     Current INR goal:  2.0-3.0   TTR:  76.6 % (1 y)   Target end date:  Indefinite   Send INR reminders to:   [facility]    Indications    Long term current use of anticoagulant therapy [Z79.01]  Prothrombin gene mutation (H) [D68.52]           Comments:  Patient has 2 mg Tablets Patient contact number [phone]  HIPAA INFO OK to leave messages on home or cell phone leave msg on cell [phone]  Goes to [facility] for labs/results do not come to in-basket +++SEND POOL MESSAG    OK to speak tbo  Edson Potts         Anticoagulation Care Providers     Provider Role Specialty Phone number    Danny Clark MD Referring Hematology [phone]

## 2022-02-10 DIAGNOSIS — D68.52 PROTHROMBIN GENE MUTATION (H): ICD-10-CM

## 2022-02-10 DIAGNOSIS — Z79.01 LONG TERM CURRENT USE OF ANTICOAGULANT THERAPY: ICD-10-CM

## 2022-02-11 RX ORDER — WARFARIN SODIUM 2 MG/1
TABLET ORAL
Qty: 65 TABLET | Refills: 3 | Status: SHIPPED | OUTPATIENT
Start: 2022-02-11 | End: 2023-01-12

## 2022-02-11 RX ORDER — WARFARIN SODIUM 2 MG/1
TABLET ORAL
Qty: 65 TABLET | Refills: 3 | Status: SHIPPED | OUTPATIENT
Start: 2022-02-11 | End: 2022-05-31

## 2022-02-11 RX ORDER — WARFARIN SODIUM 2 MG/1
TABLET ORAL
Qty: 65 TABLET | Refills: 3 | Status: SHIPPED | OUTPATIENT
Start: 2022-02-11 | End: 2022-09-15

## 2022-02-16 ENCOUNTER — TELEPHONE (OUTPATIENT)
Dept: ANTICOAGULATION | Facility: CLINIC | Age: 42
End: 2022-02-16
Payer: COMMERCIAL

## 2022-02-16 NOTE — TELEPHONE ENCOUNTER
ANTICOAGULATION     Stacey Lauryn Glynn is overdue for INR check.      Left message for patient to call and schedule lab appointment as soon as possible. If returning call, please schedule.    Also sent my chart reminder.     Cydney Rutherford RN

## 2022-02-18 ENCOUNTER — ANTICOAGULATION THERAPY VISIT (OUTPATIENT)
Dept: ANTICOAGULATION | Facility: CLINIC | Age: 42
End: 2022-02-18

## 2022-02-18 ENCOUNTER — LAB (OUTPATIENT)
Dept: LAB | Facility: CLINIC | Age: 42
End: 2022-02-18
Payer: COMMERCIAL

## 2022-02-18 DIAGNOSIS — D68.52 PROTHROMBIN GENE MUTATION (H): ICD-10-CM

## 2022-02-18 DIAGNOSIS — Z79.01 LONG TERM CURRENT USE OF ANTICOAGULANT THERAPY: Primary | ICD-10-CM

## 2022-02-18 DIAGNOSIS — Z79.01 LONG TERM CURRENT USE OF ANTICOAGULANT THERAPY: ICD-10-CM

## 2022-02-18 LAB — INR PPP: 1.92 (ref 0.85–1.15)

## 2022-02-18 PROCEDURE — 85610 PROTHROMBIN TIME: CPT

## 2022-02-18 PROCEDURE — 36415 COLL VENOUS BLD VENIPUNCTURE: CPT

## 2022-02-18 NOTE — PROGRESS NOTES
ANTICOAGULATION MANAGEMENT     Stacey Maki 41 year old female is on warfarin with subtherapeutic INR result. (Goal INR 2.0-3.0)    Recent labs: (last 7 days)     02/18/22  1625   INR 1.92*       ASSESSMENT     Source(s): Chart Review and Patient/Caregiver Call       Warfarin doses taken: Reviewed in chart    Diet: No new diet changes identified    New illness, injury, or hospitalization: No    Medication/supplement changes: None noted    Signs or symptoms of bleeding or clotting: No    Previous INR: Subtherapeutic    Additional findings: None     PLAN     Recommended plan for no diet, medication or health factor changes affecting INR     Dosing Instructions:  Increase your warfarin dose (6.2% change) with next INR in 2-3 weeks       Summary  As of 2/18/2022    Full warfarin instructions:  6 mg every Mon, Wed, Fri; 4 mg all other days   Next INR check:  3/11/2022             Detailed voice message left for Stacey with dosing instructions and follow up date.     Contact [phone] to schedule and with any changes, questions or concerns.     Education provided: Please call back if any changes to your diet, medications or how you've been taking warfarin and Contact [phone] with any changes, questions or concerns.     Plan made per [facility] anticoagulation protocol    Leticia Cho RN  Anticoagulation Clinic  2/18/2022    _______________________________________________________________________     Anticoagulation Episode Summary     Current INR goal:  2.0-3.0   TTR:  68.1 % (1 y)   Target end date:  Indefinite   Send INR reminders to:  [facility]    Indications    Long term current use of anticoagulant therapy [Z79.01]  Prothrombin gene mutation (H) [D68.52]           Comments:  Patient has 2 mg Tablets Patient contact number [phone]  HIPAA INFO OK to leave messages on home or cell phone leave msg on cell [phone]  Goes to [facility] for labs/results do not come to in-basket +++SEND POOL  ОЛЕГ ART to speak tbo Edson Potts         Anticoagulation Care Providers     Provider Role Specialty Phone number    Danny Clark MD Referring Hematology [phone]

## 2022-02-22 ENCOUNTER — APPOINTMENT (OUTPATIENT)
Dept: URGENT CARE | Facility: CLINIC | Age: 42
End: 2022-02-22
Payer: COMMERCIAL

## 2022-02-23 ENCOUNTER — OFFICE VISIT (OUTPATIENT)
Dept: URGENT CARE | Facility: URGENT CARE | Age: 42
End: 2022-02-23
Payer: COMMERCIAL

## 2022-02-23 VITALS
SYSTOLIC BLOOD PRESSURE: 127 MMHG | TEMPERATURE: 98 F | HEART RATE: 78 BPM | RESPIRATION RATE: 20 BRPM | OXYGEN SATURATION: 98 % | DIASTOLIC BLOOD PRESSURE: 81 MMHG

## 2022-02-23 DIAGNOSIS — B96.89 BV (BACTERIAL VAGINOSIS): Primary | ICD-10-CM

## 2022-02-23 DIAGNOSIS — N76.0 BV (BACTERIAL VAGINOSIS): Primary | ICD-10-CM

## 2022-02-23 LAB
ALBUMIN UR-MCNC: NEGATIVE MG/DL
APPEARANCE UR: CLEAR
BILIRUB UR QL STRIP: NEGATIVE
CLUE CELLS: PRESENT
COLOR UR AUTO: YELLOW
GLUCOSE UR STRIP-MCNC: NEGATIVE MG/DL
HGB UR QL STRIP: NEGATIVE
KETONES UR STRIP-MCNC: NEGATIVE MG/DL
LEUKOCYTE ESTERASE UR QL STRIP: ABNORMAL
NITRATE UR QL: NEGATIVE
PH UR STRIP: 7 [PH] (ref 5–7)
RBC #/AREA URNS AUTO: ABNORMAL /HPF
SP GR UR STRIP: 1.02 (ref 1–1.03)
SQUAMOUS #/AREA URNS AUTO: ABNORMAL /LPF
TRICHOMONAS, WET PREP: ABNORMAL
UROBILINOGEN UR STRIP-ACNC: 0.2 E.U./DL
WBC #/AREA URNS AUTO: ABNORMAL /HPF
WBC'S/HIGH POWER FIELD, WET PREP: ABNORMAL
YEAST, WET PREP: ABNORMAL

## 2022-02-23 PROCEDURE — 87210 SMEAR WET MOUNT SALINE/INK: CPT | Performed by: PHYSICIAN ASSISTANT

## 2022-02-23 PROCEDURE — 99213 OFFICE O/P EST LOW 20 MIN: CPT | Performed by: PHYSICIAN ASSISTANT

## 2022-02-23 PROCEDURE — 81001 URINALYSIS AUTO W/SCOPE: CPT | Performed by: PHYSICIAN ASSISTANT

## 2022-02-23 RX ORDER — CLINDAMYCIN PHOSPHATE 20 MG/G
CREAM VAGINAL
Qty: 40 G | Refills: 0 | Status: SHIPPED | OUTPATIENT
Start: 2022-02-23 | End: 2022-09-15

## 2022-02-23 RX ORDER — METRONIDAZOLE 7.5 MG/G
1 GEL VAGINAL AT BEDTIME
Qty: 70 G | Refills: 0 | Status: SHIPPED | OUTPATIENT
Start: 2022-02-23 | End: 2022-02-23

## 2022-02-24 ENCOUNTER — DOCUMENTATION ONLY (OUTPATIENT)
Dept: ANTICOAGULATION | Facility: CLINIC | Age: 42
End: 2022-02-24
Payer: COMMERCIAL

## 2022-02-24 NOTE — PROGRESS NOTES
Assessment & Plan     1. Urinary frequency  ***  - UA reflex to Microscopic and Culture  - Wet preparation  - Urine Microscopic        No follow-ups on file.    Diagnosis and treatment plan was reviewed with patient and/or family.   We went over any labs or imaging. Discussed worsening symptoms or little to no relief despite treatment plan to follow-up with PCP or return to clinic.  Patient verbalizes understanding. All questions were addressed and answered.     Giovana Alcocer PA-C  [facility]    CHIEF COMPLAINT:   Chief Complaint   Patient presents with     Urgent Care     poss UTI      Subjective     Stacey is a 41 year old female who presents to clinic today for evaluation ***. Denies having fever, chills, flank pain, nausea, vomiting, hematuria or weakness. *** Vaginal discharge, itching or pain are not present. ***    Past Medical History:   Diagnosis Date     Arteriovenous malformation      Asthma      Chronic cerebral venous sinus thrombosis     diagnosed in , 2009 5wk preg.      Heart disease     mother     Hypertension 2016    mother     Idiopathic intracranial hypertension      Papilledema      Prothrombin gene mutation (H)     heterozygous type     Tension headache      Past Surgical History:   Procedure Laterality Date     cerebral angiogram      to discuss management of neural- based fistula      SECTION       ENT SURGERY  1983    adnoids removed/tubes in ears at age 3     GYN SURGERY      C section x 2     IR INTRACRANIAL EMBOLIZATION RIGHT  2011     MRI last Nov.      NO HISTORY OF SURGERY  3/20/14    derm     VASCULAR SURGERY      fistula repaired in brain     Social History     Tobacco Use     Smoking status: Never Smoker     Smokeless tobacco: Never Used   Substance Use Topics     Alcohol use: Yes     Comment: 2-4 drinks per month     Current Outpatient Medications   Medication     acetaminophen (ACETAMIN) 500 MG tablet     escitalopram (LEXAPRO) 20  MG tablet     losartan (COZAAR) 25 MG tablet     norethindrone (MICRONOR) 0.35 MG tablet     warfarin ANTICOAGULANT (COUMADIN) 2 MG tablet     warfarin ANTICOAGULANT (COUMADIN) 2 MG tablet     warfarin ANTICOAGULANT (COUMADIN) 2 MG tablet     No current facility-administered medications for this visit.     Allergies   Allergen Reactions     Benzoyl Peroxide Swelling     Swollen eyelids     Seasonal Allergies Other (See Comments)     Runny nose, itchy eyes, breathing issues, and fatigue     Adhesive Tape Dermatitis       10 point ROS of systems were all negative except for pertinent positives noted in my HPI.      Exam: ***  /81   Pulse 78   Temp 98  F (36.7  C)   Resp 20   SpO2 98%   Constitutional: healthy, alert and no distress  ENT: MMM  Cardiovascular: RRR  Respiratory: CTA bilaterally, no rhonchi or rales  Gastrointestinal: soft and nontender  Back: No CVA tenderness B/L  Skin: no rashes      Results for orders placed or performed in visit on 02/23/22   UA reflex to Microscopic and Culture     Status: Abnormal    Specimen: Urine, Clean Catch   Result Value Ref Range    Color Urine Yellow Colorless, Straw, Light Yellow, Yellow    Appearance Urine Clear Clear    Glucose Urine Negative Negative mg/dL    Bilirubin Urine Negative Negative    Ketones Urine Negative Negative mg/dL    Specific Gravity Urine 1.020 1.003 - 1.035    Blood Urine Negative Negative    pH Urine 7.0 5.0 - 7.0    Protein Albumin Urine Negative Negative mg/dL    Urobilinogen Urine 0.2 0.2, 1.0 E.U./dL    Nitrite Urine Negative Negative    Leukocyte Esterase Urine Small (A) Negative   Urine Microscopic     Status: Abnormal   Result Value Ref Range    RBC Urine 0-2 0-2 /HPF /HPF    WBC Urine 0-5 0-5 /HPF /HPF    Squamous Epithelials Urine Few (A) None Seen /LPF    Narrative    Urine Culture not indicated   Wet preparation     Status: Abnormal    Specimen: Vagina; Swab   Result Value Ref Range    Trichomonas Absent Absent    Yeast Absent  Absent    Clue Cells Present (A) Absent    WBCs/high power field 2+ (A) None

## 2022-02-24 NOTE — PROGRESS NOTES
ANTICOAGULATION  MANAGEMENT     Interacting Medication Review    Interacting medication(s): Metronidazole (Flagyl) with warfarin.  Received BPA for this medication, but per Epic, it was not prescribed.      Duration: per Epic, not prescribed    Indication: not prescribed    New medication?: NA       PLAN     Continue current warfarin dose. Recommend to check INR on 3/11/22    Patient was not contacted    Anticoagulation Calendar updated    Merced Cadet RN

## 2022-02-24 NOTE — PROGRESS NOTES
Assessment & Plan     1. BV (bacterial vaginosis)  Treatment with medication as below  Refrain from intercourse until treatment is complete  - UA reflex to Microscopic and Culture  - Wet preparation  - Urine Microscopic  - clindamycin (CLEOCIN) 2 % vaginal cream; Apply 5mg vaginally at bedtime for 7 nights.  Dispense: 40 g; Refill: 0      Return in about 1 week (around 3/2/2022), or if symptoms worsen or fail to improve.    Diagnosis and treatment plan was reviewed with patient and/or family.   We went over any labs or imaging. Discussed worsening symptoms or little to no relief despite treatment plan to follow-up with PCP or return to clinic.  Patient verbalizes understanding. All questions were addressed and answered.     Giovana Alcocer PA-C  [facility]    CHIEF COMPLAINT:   Chief Complaint   Patient presents with     Urgent Care     poss UTI      Subjective     Stacey is a 41 year old female who presents to clinic today for evaluation of vaginal discharge. Recently had intercourse five days ago, she has since had increased discharge with vaginal discomfort and foul smelling odor. Denies having fever, chills, flank pain, nausea, vomiting, hematuria or weakness.    Past Medical History:   Diagnosis Date     Arteriovenous malformation      Asthma      Chronic cerebral venous sinus thrombosis     diagnosed in , 2009 5wk preg.      Heart disease     mother     Hypertension 2016    mother     Idiopathic intracranial hypertension      Papilledema      Prothrombin gene mutation (H)     heterozygous type     Tension headache      Past Surgical History:   Procedure Laterality Date     cerebral angiogram      to discuss management of neural- based fistula      SECTION       ENT SURGERY  1983    adnoids removed/tubes in ears at age 3     GYN SURGERY      C section x 2     IR INTRACRANIAL EMBOLIZATION RIGHT  2011     MRI last Nov.      NO HISTORY OF SURGERY  3/20/14    derm      VASCULAR SURGERY      fistula repaired in brain     Social History     Tobacco Use     Smoking status: Never Smoker     Smokeless tobacco: Never Used   Substance Use Topics     Alcohol use: Yes     Comment: 2-4 drinks per month     Current Outpatient Medications   Medication     acetaminophen (ACETAMIN) 500 MG tablet     clindamycin (CLEOCIN) 2 % vaginal cream     escitalopram (LEXAPRO) 20 MG tablet     losartan (COZAAR) 25 MG tablet     norethindrone (MICRONOR) 0.35 MG tablet     warfarin ANTICOAGULANT (COUMADIN) 2 MG tablet     warfarin ANTICOAGULANT (COUMADIN) 2 MG tablet     warfarin ANTICOAGULANT (COUMADIN) 2 MG tablet     No current facility-administered medications for this visit.     Allergies   Allergen Reactions     Benzoyl Peroxide Swelling     Swollen eyelids     Seasonal Allergies Other (See Comments)     Runny nose, itchy eyes, breathing issues, and fatigue     Adhesive Tape Dermatitis       10 point ROS of systems were all negative except for pertinent positives noted in my HPI.      Exam:   /81   Pulse 78   Temp 98  F (36.7  C)   Resp 20   SpO2 98%   Constitutional: healthy, alert and no distress  ENT: MMM  Gastrointestinal: soft and nontender  Vaginal: copious thin discharge. Vaginal vault has inflammation.   Back: No CVA tenderness B/L  Skin: no rashes      Results for orders placed or performed in visit on 02/23/22   UA reflex to Microscopic and Culture     Status: Abnormal    Specimen: Urine, Clean Catch   Result Value Ref Range    Color Urine Yellow Colorless, Straw, Light Yellow, Yellow    Appearance Urine Clear Clear    Glucose Urine Negative Negative mg/dL    Bilirubin Urine Negative Negative    Ketones Urine Negative Negative mg/dL    Specific Gravity Urine 1.020 1.003 - 1.035    Blood Urine Negative Negative    pH Urine 7.0 5.0 - 7.0    Protein Albumin Urine Negative Negative mg/dL    Urobilinogen Urine 0.2 0.2, 1.0 E.U./dL    Nitrite Urine Negative Negative    Leukocyte Esterase  Urine Small (A) Negative   Urine Microscopic     Status: Abnormal   Result Value Ref Range    RBC Urine 0-2 0-2 /HPF /HPF    WBC Urine 0-5 0-5 /HPF /HPF    Squamous Epithelials Urine Few (A) None Seen /LPF    Narrative    Urine Culture not indicated   Wet preparation     Status: Abnormal    Specimen: Vagina; Swab   Result Value Ref Range    Trichomonas Absent Absent    Yeast Absent Absent    Clue Cells Present (A) Absent    WBCs/high power field 2+ (A) None

## 2022-03-23 ENCOUNTER — TELEPHONE (OUTPATIENT)
Dept: ANTICOAGULATION | Facility: CLINIC | Age: 42
End: 2022-03-23
Payer: COMMERCIAL

## 2022-03-23 NOTE — TELEPHONE ENCOUNTER
ANTICOAGULATION     Stacey Toneye Glynn is overdue for INR check.      Left message for patient to call and schedule lab appointment as soon as possible. If returning call, please schedule.     Cydney Rutherford RN

## 2022-03-29 ENCOUNTER — MYC MEDICAL ADVICE (OUTPATIENT)
Dept: INTERNAL MEDICINE | Facility: CLINIC | Age: 42
End: 2022-03-29

## 2022-03-29 ENCOUNTER — LAB (OUTPATIENT)
Dept: LAB | Facility: CLINIC | Age: 42
End: 2022-03-29
Payer: COMMERCIAL

## 2022-03-29 ENCOUNTER — ANTICOAGULATION THERAPY VISIT (OUTPATIENT)
Dept: ANTICOAGULATION | Facility: CLINIC | Age: 42
End: 2022-03-29

## 2022-03-29 DIAGNOSIS — D68.52 PROTHROMBIN GENE MUTATION (H): ICD-10-CM

## 2022-03-29 DIAGNOSIS — Z79.01 LONG TERM CURRENT USE OF ANTICOAGULANT THERAPY: ICD-10-CM

## 2022-03-29 DIAGNOSIS — B00.1 COLD SORE: ICD-10-CM

## 2022-03-29 DIAGNOSIS — Z79.01 LONG TERM CURRENT USE OF ANTICOAGULANT THERAPY: Primary | ICD-10-CM

## 2022-03-29 LAB — INR PPP: 2.77 (ref 0.85–1.15)

## 2022-03-29 PROCEDURE — 85610 PROTHROMBIN TIME: CPT

## 2022-03-29 PROCEDURE — 36415 COLL VENOUS BLD VENIPUNCTURE: CPT

## 2022-03-29 RX ORDER — VALACYCLOVIR HYDROCHLORIDE 500 MG/1
2000 TABLET, FILM COATED ORAL 2 TIMES DAILY
Qty: 4 TABLET | Refills: 3 | Status: SHIPPED | OUTPATIENT
Start: 2022-03-29 | End: 2022-09-15

## 2022-03-29 NOTE — TELEPHONE ENCOUNTER
See [employer] message. Should she do Evisit for this or just refill?     She has Valtrex on previous med list from 2019 with refills.       Last OV on 8/20/21.

## 2022-03-29 NOTE — PROGRESS NOTES
ANTICOAGULATION MANAGEMENT     Stacey Maki 41 year old female is on warfarin with therapeutic INR result. (Goal INR 2.0-3.0)    Recent labs: (last 7 days)     03/29/22  1515   INR 2.77*       ASSESSMENT       Source(s): Chart Review       Warfarin doses taken: Reviewed in chart    Diet: No new diet changes identified    New illness, injury, or hospitalization: No    Medication/supplement changes: None noted    Signs or symptoms of bleeding or clotting: No    Previous INR: Therapeutic last 2(+) visits    Additional findings: None       PLAN     Recommended plan for no diet, medication or health factor changes affecting INR     Dosing Instructions: Continue your current warfarin dose with next INR in 4 weeks       Summary  As of 3/29/2022    Full warfarin instructions:  6 mg every Mon, Wed, Fri; 4 mg all other days   Next INR check:  4/26/2022             Detailed voice message left for Stacey with dosing instructions and follow up date.     Contact [phone] to schedule and with any changes, questions or concerns.     Education provided: Please call back if any changes to your diet, medications or how you've been taking warfarin    Plan made per [facility] anticoagulation protocol    Caesar Dumont, RN  Anticoagulation Clinic  3/29/2022    _______________________________________________________________________     Anticoagulation Episode Summary     Current INR goal:  2.0-3.0   TTR:  72.9 % (1 y)   Target end date:  Indefinite   Send INR reminders to:  [facility]    Indications    Long term current use of anticoagulant therapy [Z79.01]  Prothrombin gene mutation (H) [D68.52]           Comments:  Patient has 2 mg Tablets Patient contact number [phone]  HIPAA INFO OK to leave messages on home or cell phone leave msg on cell [phone]  Goes to [facility] for labs/results do not come to in-basket +++SEND POOL MESSAG    OK to speak byron Potts         Anticoagulation Care Providers     Provider Role  Specialty Phone number    Danny Clark MD Referring Hematology [phone]

## 2022-05-10 ENCOUNTER — LAB (OUTPATIENT)
Dept: LAB | Facility: CLINIC | Age: 42
End: 2022-05-10
Payer: COMMERCIAL

## 2022-05-10 ENCOUNTER — ANTICOAGULATION THERAPY VISIT (OUTPATIENT)
Dept: ANTICOAGULATION | Facility: CLINIC | Age: 42
End: 2022-05-10

## 2022-05-10 DIAGNOSIS — D68.52 PROTHROMBIN GENE MUTATION (H): ICD-10-CM

## 2022-05-10 DIAGNOSIS — Z79.01 LONG TERM CURRENT USE OF ANTICOAGULANT THERAPY: Primary | ICD-10-CM

## 2022-05-10 DIAGNOSIS — Z79.01 LONG TERM CURRENT USE OF ANTICOAGULANT THERAPY: ICD-10-CM

## 2022-05-10 LAB — INR PPP: 2.48 (ref 0.85–1.15)

## 2022-05-10 PROCEDURE — 36415 COLL VENOUS BLD VENIPUNCTURE: CPT

## 2022-05-10 PROCEDURE — 85610 PROTHROMBIN TIME: CPT

## 2022-05-10 NOTE — PROGRESS NOTES
ANTICOAGULATION MANAGEMENT     Stacey Maki 42 year old female is on warfarin with therapeutic INR result. (Goal INR 2.0-3.0)    Recent labs: (last 7 days)     05/10/22  1447   INR 2.48*       ASSESSMENT       Source(s): Patient/Caregiver Call       Warfarin doses taken: Warfarin taken as instructed    Diet: No new diet changes identified    New illness, injury, or hospitalization: No    Medication/supplement changes: None noted    Signs or symptoms of bleeding or clotting: No    Previous INR: Therapeutic last visit; previously outside of goal range    Additional findings: None       PLAN     Recommended plan for no diet, medication or health factor changes affecting INR     Dosing Instructions: continue your current warfarin dose with next INR in 4 weeks       Summary  As of 5/10/2022    Full warfarin instructions:  6 mg every Mon, Wed, Fri; 4 mg all other days   Next INR check:  6/7/2022             Telephone call with Stacey who verbalizes understanding and agrees to plan and who agrees to plan and repeated back plan correctly    Patient will do a walk[facility]in at [facility]     Education provided: None required    Plan made per [facility] anticoagulation protocol    Praveena Blackwood, RN  Anticoagulation Clinic  5/10/2022    _______________________________________________________________________     Anticoagulation Episode Summary     Current INR goal:  2.0-3.0   TTR:  80.3 % (1 y)   Target end date:  Indefinite   Send INR reminders to:  [facility]    Indications    Long term current use of anticoagulant therapy [Z79.01]  Prothrombin gene mutation (H) [D68.52]           Comments:           Anticoagulation Care Providers     Provider Role Specialty Phone number    Danny Clark MD Referring Hematology [phone]

## 2022-06-21 ENCOUNTER — ANTICOAGULATION THERAPY VISIT (OUTPATIENT)
Dept: ANTICOAGULATION | Facility: CLINIC | Age: 42
End: 2022-06-21

## 2022-06-21 ENCOUNTER — LAB (OUTPATIENT)
Dept: LAB | Facility: CLINIC | Age: 42
End: 2022-06-21
Payer: COMMERCIAL

## 2022-06-21 DIAGNOSIS — D68.52 PROTHROMBIN GENE MUTATION (H): ICD-10-CM

## 2022-06-21 DIAGNOSIS — Z79.01 LONG TERM CURRENT USE OF ANTICOAGULANT THERAPY: ICD-10-CM

## 2022-06-21 DIAGNOSIS — Z79.01 LONG TERM CURRENT USE OF ANTICOAGULANT THERAPY: Primary | ICD-10-CM

## 2022-06-21 LAB — INR PPP: 2.35 (ref 0.85–1.15)

## 2022-06-21 PROCEDURE — 36415 COLL VENOUS BLD VENIPUNCTURE: CPT

## 2022-06-21 PROCEDURE — 85610 PROTHROMBIN TIME: CPT

## 2022-06-27 DIAGNOSIS — Z79.01 LONG TERM CURRENT USE OF ANTICOAGULANT THERAPY: Primary | ICD-10-CM

## 2022-06-27 DIAGNOSIS — D68.52 PROTHROMBIN GENE MUTATION (H): ICD-10-CM

## 2022-06-27 DIAGNOSIS — Z86.718 HISTORY OF CEREBRAL VENOUS SINUS THROMBOSIS: ICD-10-CM

## 2022-07-06 DIAGNOSIS — F32.A DEPRESSION, UNSPECIFIED DEPRESSION TYPE: ICD-10-CM

## 2022-07-06 DIAGNOSIS — F41.1 GAD (GENERALIZED ANXIETY DISORDER): ICD-10-CM

## 2022-07-07 ENCOUNTER — MYC MEDICAL ADVICE (OUTPATIENT)
Dept: INTERNAL MEDICINE | Facility: CLINIC | Age: 42
End: 2022-07-07

## 2022-07-07 DIAGNOSIS — F41.1 GAD (GENERALIZED ANXIETY DISORDER): ICD-10-CM

## 2022-07-07 DIAGNOSIS — I10 BENIGN ESSENTIAL HYPERTENSION: Primary | ICD-10-CM

## 2022-07-07 DIAGNOSIS — F32.A DEPRESSION, UNSPECIFIED DEPRESSION TYPE: ICD-10-CM

## 2022-07-08 RX ORDER — ESCITALOPRAM OXALATE 20 MG/1
TABLET ORAL
Qty: 90 TABLET | Refills: 0 | Status: SHIPPED | OUTPATIENT
Start: 2022-07-08 | End: 2022-09-15

## 2022-07-08 NOTE — TELEPHONE ENCOUNTER
See her [employer] message. The refill was faxed to the OB dept but it looks like there was an error.      Faxed refill request to Dr Parr.

## 2022-07-14 ENCOUNTER — DOCUMENTATION ONLY (OUTPATIENT)
Dept: HEMATOLOGY | Facility: CLINIC | Age: 42
End: 2022-07-14

## 2022-07-14 DIAGNOSIS — Z79.01 CURRENT USE OF LONG TERM ANTICOAGULATION: ICD-10-CM

## 2022-07-14 DIAGNOSIS — D68.52 PROTHROMBIN GENE MUTATION (H): Primary | ICD-10-CM

## 2022-07-14 NOTE — PROGRESS NOTES
ANTICOAGULATION CLINIC REFERRAL RENEWAL REQUEST       An annual renewal order is required for all patients referred to [facility].?  Please review and sign the pended referral order for Stacey Maki.       ANTICOAGULATION SUMMARY      Warfarin indication(s)   Prothrombin Gene Mutation    Mechanical heart valve present?  NO       Current goal range   INR: 2.0-3.0     Goal appropriate for indication? Goal INR 2-3, standard for indication(s) above     Time in Therapeutic Range (TTR)  (Goal > 60%) 82.4%       Office visit with referring provider's group within last year yes on 1/6/22       Nelsy Fletcher RN  [facility]

## 2022-07-21 ENCOUNTER — TELEPHONE (OUTPATIENT)
Dept: ANTICOAGULATION | Facility: CLINIC | Age: 42
End: 2022-07-21

## 2022-07-21 NOTE — TELEPHONE ENCOUNTER
ANTICOAGULATION     Stacey Maki is overdue for INR check.      Left message for patient to call and schedule lab appointment as soon as possible. If returning call, please schedule.     Donte Vo RN

## 2022-07-26 ENCOUNTER — LAB (OUTPATIENT)
Dept: LAB | Facility: CLINIC | Age: 42
End: 2022-07-26
Payer: COMMERCIAL

## 2022-07-26 ENCOUNTER — ANTICOAGULATION THERAPY VISIT (OUTPATIENT)
Dept: ANTICOAGULATION | Facility: CLINIC | Age: 42
End: 2022-07-26

## 2022-07-26 DIAGNOSIS — Z79.01 LONG TERM CURRENT USE OF ANTICOAGULANT THERAPY: Primary | ICD-10-CM

## 2022-07-26 DIAGNOSIS — D68.52 PROTHROMBIN GENE MUTATION (H): ICD-10-CM

## 2022-07-26 DIAGNOSIS — D68.52 PROTHROMBIN GENE MUTATION (H): Primary | ICD-10-CM

## 2022-07-26 LAB — INR PPP: 3.01 (ref 0.85–1.15)

## 2022-07-26 PROCEDURE — 85610 PROTHROMBIN TIME: CPT

## 2022-07-26 PROCEDURE — 36415 COLL VENOUS BLD VENIPUNCTURE: CPT

## 2022-07-26 NOTE — PROGRESS NOTES
ANTICOAGULATION MANAGEMENT     Stacey Maki 42 year old female is on warfarin with therapeutic INR result. (Goal INR 2.0-3.0)    Recent labs: (last 7 days)     07/26/22  1416   INR 3.01*       ASSESSMENT       Source(s): Chart Review and Patient/Caregiver Call       Warfarin doses taken: Warfarin taken as instructed    Diet: Change in alcohol intake may be affecting INR. some cocktailslast sat at a concert     New illness, injury, or hospitalization: No    Medication/supplement changes: None noted    Signs or symptoms of bleeding or clotting: No    Previous INR: Therapeutic last 2(+) visits    Additional findings: None       PLAN     Recommended plan for temporary change(s) affecting INR     Dosing Instructions: Continue your current warfarin dose with next INR in 4 weeks       Summary  As of 7/26/2022    Full warfarin instructions:  6 mg every Mon, Wed, Fri; 4 mg all other days   Next INR check:  8/23/2022             Telephone call with Stacey who verbalizes understanding and agrees to plan    Patient offered & declined to schedule next visit    Education provided: Potential interaction between warfarin and alcohol and Goal range and significance of current result    Plan made per [facility] anticoagulation protocol    Cydney Rutherford RN  Anticoagulation Clinic  7/26/2022    _______________________________________________________________________     Anticoagulation Episode Summary     Current INR goal:  2.0-3.0   TTR:  82.3 % (1 y)   Target end date:  Indefinite   Send INR reminders to:  [facility]    Indications    Long term current use of anticoagulant therapy [Z79.01]  Prothrombin gene mutation (H) [D68.52]           Comments:           Anticoagulation Care Providers     Provider Role Specialty Phone number    Danny Clark MD Referring Hematology [phone]    Norman Albert PA-C Referring Hematology [phone]

## 2022-08-11 ENCOUNTER — MYC MEDICAL ADVICE (OUTPATIENT)
Dept: INTERNAL MEDICINE | Facility: CLINIC | Age: 42
End: 2022-08-11

## 2022-08-11 DIAGNOSIS — I10 BENIGN ESSENTIAL HYPERTENSION: ICD-10-CM

## 2022-08-31 ENCOUNTER — MYC MEDICAL ADVICE (OUTPATIENT)
Dept: ANTICOAGULATION | Facility: CLINIC | Age: 42
End: 2022-08-31

## 2022-09-08 ENCOUNTER — LAB (OUTPATIENT)
Dept: LAB | Facility: CLINIC | Age: 42
End: 2022-09-08
Payer: COMMERCIAL

## 2022-09-08 ENCOUNTER — ANTICOAGULATION THERAPY VISIT (OUTPATIENT)
Dept: ANTICOAGULATION | Facility: CLINIC | Age: 42
End: 2022-09-08

## 2022-09-08 DIAGNOSIS — Z79.01 LONG TERM CURRENT USE OF ANTICOAGULANT THERAPY: Primary | ICD-10-CM

## 2022-09-08 DIAGNOSIS — D68.52 PROTHROMBIN GENE MUTATION (H): ICD-10-CM

## 2022-09-08 DIAGNOSIS — Z79.01 CURRENT USE OF LONG TERM ANTICOAGULATION: ICD-10-CM

## 2022-09-08 LAB — INR PPP: 3.43 (ref 0.85–1.15)

## 2022-09-08 PROCEDURE — 85610 PROTHROMBIN TIME: CPT

## 2022-09-08 PROCEDURE — 36415 COLL VENOUS BLD VENIPUNCTURE: CPT

## 2022-09-08 NOTE — PROGRESS NOTES
ANTICOAGULATION MANAGEMENT     Stacey Maki 42 year old female is on warfarin with supratherapeutic INR result. (Goal INR 2.0-3.0)    Recent labs: (last 7 days)     09/08/22  1456   INR 3.43*       ASSESSMENT       Source(s): Chart Review       Warfarin doses taken: Reviewed in chart    Diet: No new diet changes identified    New illness, injury, or hospitalization: No    Medication/supplement changes: None noted    Signs or symptoms of bleeding or clotting: No    Previous INR: Supratherapeutic    Additional findings: None       PLAN     Recommended plan for no diet, medication or health factor changes affecting INR     Dosing Instructions: decrease your warfarin dose (5.9% change) with next INR in 2 weeks       Summary  As of 9/8/2022    Full warfarin instructions:  6 mg every Sun, Wed; 4 mg all other days   Next INR check:  9/22/2022             Detailed voice message left for Stacey with dosing instructions and follow up date.     Contact [phone] to schedule and with any changes, questions or concerns.     Education provided: Please call back if any changes to your diet, medications or how you've been taking warfarin and Contact [phone] with any changes, questions or concerns.     Plan made per [facility] anticoagulation protocol    Leticia Cho RN  Anticoagulation Clinic  9/8/2022    _______________________________________________________________________     Anticoagulation Episode Summary     Current INR goal:  2.0-3.0   TTR:  74.2 % (1 y)   Target end date:  Indefinite   Send INR reminders to:  [facility]    Indications    Long term current use of anticoagulant therapy [Z79.01]  Prothrombin gene mutation (H) [D68.52]           Comments:           Anticoagulation Care Providers     Provider Role Specialty Phone number    Danny Clark MD Referring Hematology [phone]    Norman Albert PA-C Referring Hematology [phone]

## 2022-09-14 NOTE — TELEPHONE ENCOUNTER
90 day refill sent and [employer] message sent to pt to call for an appointment with Dr Lange.   Patient up to floor via bed. Patient AAOX4, arouses to voice. Patient with 2 L per nasal cannula in place, VSS. Fluids infusing as ordered. Lap sites noted to right abdomen, covered with derma bond. Midline incision covered with island dressing, scant amount of serosanguineous drainage noted. Malvin drain noted to right abdomen covered by gauze; gauze with small amount of drainage but marked for closer monitoring. Plan of care reviewed with patient and family. Patient verbalized understanding. Patient oriented to room and use of call bell. Bed in lowest locked position, call bell in reach. SCDs in place. IS at bedside. Will continue to monitor.

## 2022-09-15 ENCOUNTER — VIRTUAL VISIT (OUTPATIENT)
Dept: INTERNAL MEDICINE | Facility: CLINIC | Age: 42
End: 2022-09-15
Payer: COMMERCIAL

## 2022-09-15 DIAGNOSIS — F41.1 GAD (GENERALIZED ANXIETY DISORDER): ICD-10-CM

## 2022-09-15 DIAGNOSIS — R73.09 BLOOD GLUCOSE ABNORMAL: ICD-10-CM

## 2022-09-15 DIAGNOSIS — Z13.6 CARDIOVASCULAR SCREENING; LDL GOAL LESS THAN 130: ICD-10-CM

## 2022-09-15 DIAGNOSIS — F33.42 RECURRENT MAJOR DEPRESSIVE DISORDER, IN FULL REMISSION (H): ICD-10-CM

## 2022-09-15 DIAGNOSIS — I10 BENIGN ESSENTIAL HYPERTENSION: Primary | ICD-10-CM

## 2022-09-15 PROCEDURE — 99214 OFFICE O/P EST MOD 30 MIN: CPT | Mod: 95 | Performed by: INTERNAL MEDICINE

## 2022-09-15 RX ORDER — ESCITALOPRAM OXALATE 20 MG/1
20 TABLET ORAL DAILY
Qty: 90 TABLET | Refills: 3 | Status: SHIPPED | OUTPATIENT
Start: 2022-09-15 | End: 2023-09-07

## 2022-09-15 ASSESSMENT — PATIENT HEALTH QUESTIONNAIRE - PHQ9
SUM OF ALL RESPONSES TO PHQ QUESTIONS 1-9: 3
10. IF YOU CHECKED OFF ANY PROBLEMS, HOW DIFFICULT HAVE THESE PROBLEMS MADE IT FOR YOU TO DO YOUR WORK, TAKE CARE OF THINGS AT HOME, OR GET ALONG WITH OTHER PEOPLE: NOT DIFFICULT AT ALL
SUM OF ALL RESPONSES TO PHQ QUESTIONS 1-9: 3

## 2022-09-15 NOTE — PROGRESS NOTES
"Stacey is a 42 year old who is being evaluated via a billable video visit.      How would you like to obtain your AVS? MyChart  If the video visit is dropped, the invitation should be resent by: Send to e-mail at: [email or web address]  Will anyone else be joining your video visit? No        Assessment & Plan     Benign essential hypertension  Well-controlled, continue medication, recommend schedule labs  - Basic metabolic panel  (Ca, Cl, CO2, Creat, Gluc, K, Na, BUN); Future  - Albumin Random Urine Quantitative with Creat Ratio; Future    Recurrent major depressive disorder, in full remission (H)  Well-controlled, continue medication    NIKA (generalized anxiety disorder)  Some mild flare but overall stable.  Work on getting counselor  - escitalopram (LEXAPRO) 20 MG tablet; Take 1 tablet (20 mg) by mouth daily    Blood glucose abnormal  Recheck lab  - Basic metabolic panel  (Ca, Cl, CO2, Creat, Gluc, K, Na, BUN); Future  - Hemoglobin A1c; Future    CARDIOVASCULAR SCREENING; LDL GOAL LESS THAN 130    - Lipid panel reflex to direct LDL Fasting; Future    Recommend she consider getting a flu shot and COVID booster.         BMI:   Estimated body mass index is 27.79 kg/m  as calculated from the following:    Height as of 12/28/21: 1.6 m (5' 3\").    Weight as of 12/28/21: 71.2 kg (156 lb 14.4 oz).           Return in about 1 year (around 9/15/2023).    Kaitlin Parr MD  [facility]    Keshia Ibarra is a 42 year old, presenting for the following health issues:  No chief complaint on file.      HPI     HTN: Has not been checking outside.  Previous readings at OB/GYN in urgent care have been normal.        Depression and Anxiety Follow-Up  She feels her symptoms are well controlled.  She does have some family stresses and is going to be setting up a counselor but does not feel need to make any changes in medication.  She is tolerating her medication well..    How are you doing with your depression " since your last visit? No change    How are you doing with your anxiety since your last visit?  No change    Are you having other symptoms that might be associated with depression or anxiety? No    Have you had a significant life event? No     Do you have any concerns with your use of alcohol or other drugs? No      PHQ 8/20/2021 12/28/2021 9/15/2022   PHQ-9 Total Score 3 13 3   Q9: Thoughts of better off dead/self-harm past 2 weeks Not at all Not at all Not at all     NIKA-7 SCORE 11/1/2019 11/4/2020 8/20/2021   Total Score - 7 (mild anxiety) -   Total Score 4 7 2           Suicide Assessment Five-step Evaluation and Treatment (SAFE-T)      How many servings of fruits and vegetables do you eat daily?  2-3    On average, how many sweetened beverages do you drink each day (Examples: soda, juice, sweet tea, etc.  Do NOT count diet or artificially sweetened beverages)?   0    How many days per week do you exercise enough to make your heart beat faster? 3 or less    How many minutes a day do you exercise enough to make your heart beat faster? 20 - 29    How many days per week do you miss taking your medication? 0      Patient Active Problem List   Diagnosis     Benign neoplasm of skin     Dermatofibroma     Prothrombin gene mutation (H)     Long term current use of anticoagulant therapy     Cerebral venous sinus thrombosis     Benign essential hypertension     Premenstrual dysphoric disorder     Recurrent major depressive disorder, in full remission (H)     Anxiety     Current Outpatient Medications   Medication Sig Dispense Refill     acetaminophen (TYLENOL) 500 MG tablet Take 2 tablets by mouth every 6 hours as needed.       escitalopram (LEXAPRO) 20 MG tablet TAKE 1 TABLET(20 MG) BY MOUTH DAILY 90 tablet 0     losartan (COZAAR) 25 MG tablet TAKE 1 TABLET(25 MG) BY MOUTH DAILY 90 tablet 3     norethindrone (MICRONOR) 0.35 MG tablet Take 1 tablet (0.35 mg) by mouth daily 84 tablet 4     warfarin ANTICOAGULANT (COUMADIN)  2 MG tablet TAKE 2 TO 3 TABLETS BY MOUTH DAILY AS DIRECTED 65 tablet 3        Review of Systems   No fever, chills, no dyspnea on exertion, no chest pain, palpitations, PND, orthopnea, edema, syncope, headache, abdominal pain       Objective           Vitals:  No vitals were obtained today due to virtual visit.    Physical Exam   GENERAL: Healthy, alert and no distress  EYES: Eyes grossly normal to inspection.  No discharge or erythema, or obvious scleral/conjunctival abnormalities.  RESP: No audible wheeze, cough, or visible cyanosis.  No visible retractions or increased work of breathing.    SKIN: Visible skin clear. No significant rash, abnormal pigmentation or lesions.  NEURO: Cranial nerves grossly intact.  Mentation and speech appropriate for age.  PSYCH: Mentation appears normal, affect normal/bright, judgement and insight intact, normal speech and appearance well-groomed.                Video-Visit Details    Video Start Time: 8:30    Type of service:  Video Visit    Video End Time:8:43    Originating Location (pt. Location): Home    Distant Location (provider location):  [facility]     Platform used for Video Visit: [employer]

## 2022-09-15 NOTE — LETTER
My Depression Action Plan  Name: Stacey Maki   Date of Birth 1980  Date: 9/15/2022    My doctor: Kaitlin Parr   [facility] [place] [address] [facility] [address]  [phone]          GREEN    ZONE   Good Control    What it looks like:     Things are going generally well. You have normal ups and downs. You may even feel depressed from time to time, but bad moods usually last less than a day.   What you need to do:  1. Continue to care for yourself (see self care plan)  2. Check your depression survival kit and update it as needed  3. Follow your physician s recommendations including any medication.  4. Do not stop taking medication unless you consult with your physician first.           YELLOW         ZONE Getting Worse    What it looks like:     Depression is starting to interfere with your life.     It may be hard to get out of bed; you may be starting to isolate yourself from others.    Symptoms of depression are starting to last most all day and this has happened for several days.     You may have suicidal thoughts but they are not constant.   What you need to do:     1. Call your care team. Your response to treatment will improve if you keep your care team informed of your progress. Yellow periods are signs an adjustment may need to be made.     2. Continue your self-care.  Just get dressed and ready for the day.  Don't give yourself time to talk yourself out of it.    3. Talk to someone in your support network.    4. Open up your Depression Self-Care Plan/Wellness Kit.           RED    ZONE Medical Alert - Get Help    What it looks like:     Depression is seriously interfering with your life.     You may experience these or other symptoms: You can t get out of bed most days, can t work or engage in other necessary activities, you have trouble taking care of basic hygiene, or basic responsibilities, thoughts of suicide or death that will  not go away, self-injurious behavior.     What you need to do:  1. Call your care team and request a same-day appointment. If they are not available (weekends or after hours) call your local crisis line, emergency room or 911.          Depression Self-Care Plan / Wellness Kit    Many people find that medication and therapy are helpful treatments for managing depression. In addition, making small changes to your everyday life can help to boost your mood and improve your wellbeing. Below are some tips for you to consider. Be sure to talk with your medical provider and/or behavioral health consultant if your symptoms are worsening or not improving.     Sleep   Sleep hygiene  means all of the habits that support good, restful sleep. It includes maintaining a consistent bedtime and wake time, using your bedroom only for sleeping or sex, and keeping the bedroom dark and free of distractions like a computer, smartphone, or television.     Develop a Healthy Routine  Maintain good hygiene. Get out of bed in the morning, make your bed, brush your teeth, take a shower, and get dressed. Don t spend too much time viewing media that makes you feel stressed. Find time to relax each day.    Exercise  Get some form of exercise every day. This will help reduce pain and release endorphins, the  feel good  chemicals in your brain. It can be as simple as just going for a walk or doing some gardening, anything that will get you moving.      Diet  Strive to eat healthy foods, including fruits and vegetables. Drink plenty of water. Avoid excessive sugar, caffeine, alcohol, and other mood-altering substances.     Stay Connected with Others  Stay in touch with friends and family members.    Manage Your Mood  Try deep breathing, massage therapy, biofeedback, or meditation. Take part in fun activities when you can. Try to find something to smile about each day.     Psychotherapy  Be open to working with a therapist if your provider recommends  it.     Medication  Be sure to take your medication as prescribed. Most anti-depressants need to be taken every day. It usually takes several weeks for medications to work. Not all medicines work for all people. It is important to follow-up with your provider to make sure you have a treatment plan that is working for you. Do not stop your medication abruptly without first discussing it with your provider.    Crisis Resources   These hotlines are for both adults and children. They and are open 24 hours a day, 7 days a week unless noted otherwise.      National Suicide Prevention Lifeline   988 or 0-326-647-BEFI (9574)      Crisis Text Line    www.crisistextline.org  Text HOME to 281620 from anywhere in the [place], anytime, about any type of crisis. A live, trained crisis counselor will receive the text and respond quickly.      [place] Lifeline for LGBTQ Youth  A national crisis intervention and suicide lifeline for LGBTQ youth under 25. Provides a safe place to talk without judgement. Call 1-948.105.7294; text START to 361614 or visit www.thetrevorproject.org to talk to a trained counselor.      For [facility] crisis numbers, visit the [facility] website at:  https://[place].gov/dhs/people-we-serve/adults/health-care/mental-health/resources/crisis-contacts.jsp

## 2022-09-29 ENCOUNTER — MYC MEDICAL ADVICE (OUTPATIENT)
Dept: ANTICOAGULATION | Facility: CLINIC | Age: 42
End: 2022-09-29

## 2022-09-29 NOTE — TELEPHONE ENCOUNTER
Stacey,     Our records show you were due to check your INR on 9/22/22.    Please call [phone] as soon as possible to schedule an appointment.  If there has been a change in your care or other concerns, please reply to let us know so we can help or update our records.       KAYE GILMAN RN  [facility] Anticoagulation Management [facility]

## 2022-10-06 ENCOUNTER — MYC MEDICAL ADVICE (OUTPATIENT)
Dept: ANTICOAGULATION | Facility: CLINIC | Age: 42
End: 2022-10-06

## 2022-10-06 NOTE — TELEPHONE ENCOUNTER
Stacey,     Our records show you were due to check your INR on 9/22/22.    Please call [phone] as soon as possible to schedule an appointment.  If there has been a change in your care or other concerns, please reply to let us know so we can help or update our records.       KAYE GILMAN RN  [facility] Anticoagulation Management Program

## 2022-10-14 ENCOUNTER — DOCUMENTATION ONLY (OUTPATIENT)
Dept: ANTICOAGULATION | Facility: CLINIC | Age: 42
End: 2022-10-14

## 2022-10-14 NOTE — PROGRESS NOTES
ANTICOAGULATION     Stacey Maki is overdue for INR check.      Reminder letter sent    Praveena Blackwood RN

## 2022-10-21 ENCOUNTER — LAB (OUTPATIENT)
Dept: LAB | Facility: CLINIC | Age: 42
End: 2022-10-21
Payer: COMMERCIAL

## 2022-10-21 ENCOUNTER — ANTICOAGULATION THERAPY VISIT (OUTPATIENT)
Dept: ANTICOAGULATION | Facility: CLINIC | Age: 42
End: 2022-10-21

## 2022-10-21 DIAGNOSIS — Z79.01 LONG TERM CURRENT USE OF ANTICOAGULANT THERAPY: Primary | ICD-10-CM

## 2022-10-21 DIAGNOSIS — D68.52 PROTHROMBIN GENE MUTATION (H): ICD-10-CM

## 2022-10-21 DIAGNOSIS — Z13.6 CARDIOVASCULAR SCREENING; LDL GOAL LESS THAN 130: ICD-10-CM

## 2022-10-21 DIAGNOSIS — R73.09 BLOOD GLUCOSE ABNORMAL: ICD-10-CM

## 2022-10-21 DIAGNOSIS — I10 BENIGN ESSENTIAL HYPERTENSION: ICD-10-CM

## 2022-10-21 DIAGNOSIS — Z79.01 CURRENT USE OF LONG TERM ANTICOAGULATION: ICD-10-CM

## 2022-10-21 LAB
ANION GAP SERPL CALCULATED.3IONS-SCNC: 12 MMOL/L (ref 7–15)
BUN SERPL-MCNC: 8.3 MG/DL (ref 6–20)
CALCIUM SERPL-MCNC: 8.8 MG/DL (ref 8.6–10)
CHLORIDE SERPL-SCNC: 104 MMOL/L (ref 98–107)
CHOLEST SERPL-MCNC: 215 MG/DL
CREAT SERPL-MCNC: 0.71 MG/DL (ref 0.51–0.95)
DEPRECATED HCO3 PLAS-SCNC: 19 MMOL/L (ref 22–29)
GFR SERPL CREATININE-BSD FRML MDRD: >90 ML/MIN/1.73M2
GLUCOSE SERPL-MCNC: 89 MG/DL (ref 70–99)
HBA1C MFR BLD: 5.1 %
HDLC SERPL-MCNC: 54 MG/DL
INR PPP: 2.32 (ref 0.85–1.15)
LDLC SERPL CALC-MCNC: 145 MG/DL
NONHDLC SERPL-MCNC: 161 MG/DL
POTASSIUM SERPL-SCNC: 4.2 MMOL/L (ref 3.4–5.3)
SODIUM SERPL-SCNC: 135 MMOL/L (ref 136–145)
TRIGL SERPL-MCNC: 80 MG/DL

## 2022-10-21 PROCEDURE — 36415 COLL VENOUS BLD VENIPUNCTURE: CPT

## 2022-10-21 PROCEDURE — 83036 HEMOGLOBIN GLYCOSYLATED A1C: CPT

## 2022-10-21 PROCEDURE — 85610 PROTHROMBIN TIME: CPT

## 2022-10-21 PROCEDURE — 80048 BASIC METABOLIC PNL TOTAL CA: CPT

## 2022-10-21 PROCEDURE — 80061 LIPID PANEL: CPT

## 2022-10-21 NOTE — PROGRESS NOTES
ANTICOAGULATION MANAGEMENT     Stacey Maki 42 year old female is on warfarin with therapeutic INR result. (Goal INR 2.0-3.0)    Recent labs: (last 7 days)     10/21/22  0850   INR 2.32*       ASSESSMENT       Source(s): Chart Review       Warfarin doses taken: Reviewed in chart    Diet: No new diet changes identified    New illness, injury, or hospitalization: No    Medication/supplement changes: None noted    Signs or symptoms of bleeding or clotting: No    Previous INR: Supratherapeutic    Additional findings: None       PLAN     Recommended plan for no diet, medication or health factor changes affecting INR     Dosing Instructions: Continue your current warfarin dose with next INR in 3 weeks       Summary  As of 10/21/2022    Full warfarin instructions:  6 mg every Sun, Wed; 4 mg all other days; Starting 10/21/2022   Next INR check:  11/11/2022             Detailed voice message left for Stacey with dosing instructions and follow up date.     Contact [phone] to schedule and with any changes, questions or concerns.     Education provided:     Please call back if any changes to your diet, medications or how you've been taking warfarin    Contact [phone] with any changes, questions or concerns.     Plan made per [facility] anticoagulation protocol    Merced Cadet RN  Anticoagulation Clinic  10/21/2022    _______________________________________________________________________     Anticoagulation Episode Summary     Current INR goal:  2.0-3.0   TTR:  71.3 % (1 y)   Target end date:  Indefinite   Send INR reminders to:   [facility]ICO [facility]    Indications    Long term current use of anticoagulant therapy [Z79.01]  Prothrombin gene mutation (H) [D68.52]           Comments:           Anticoagulation Care Providers     Provider Role Specialty Phone number    Danny Clark MD Referring Hematology [phone]    Norman Albert PA-C Referring Hematology [phone]

## 2022-11-18 ENCOUNTER — MYC MEDICAL ADVICE (OUTPATIENT)
Dept: ANTICOAGULATION | Facility: CLINIC | Age: 42
End: 2022-11-18

## 2022-11-18 NOTE — TELEPHONE ENCOUNTER
Stacey,     Our records show you were due to check your INR on 11/11/22.    Please call [phone] as soon as possible to schedule an appointment.  If there has been a change in your care or other concerns, please reply to let us know so we can help or update our records.       KAYE GILMAN RN  [facility]ticoagulation [facility]

## 2022-11-20 ENCOUNTER — HEALTH MAINTENANCE LETTER (OUTPATIENT)
Age: 42
End: 2022-11-20

## 2022-11-30 ENCOUNTER — LAB (OUTPATIENT)
Dept: LAB | Facility: CLINIC | Age: 42
End: 2022-11-30
Payer: COMMERCIAL

## 2022-11-30 DIAGNOSIS — D68.52 PROTHROMBIN GENE MUTATION (H): ICD-10-CM

## 2022-11-30 DIAGNOSIS — Z79.01 CURRENT USE OF LONG TERM ANTICOAGULATION: ICD-10-CM

## 2022-11-30 LAB — INR PPP: 2.76 (ref 0.85–1.15)

## 2022-11-30 PROCEDURE — 85610 PROTHROMBIN TIME: CPT

## 2022-11-30 PROCEDURE — 36415 COLL VENOUS BLD VENIPUNCTURE: CPT

## 2022-12-01 ENCOUNTER — ANTICOAGULATION THERAPY VISIT (OUTPATIENT)
Dept: ANTICOAGULATION | Facility: CLINIC | Age: 42
End: 2022-12-01

## 2022-12-01 DIAGNOSIS — Z79.01 LONG TERM CURRENT USE OF ANTICOAGULANT THERAPY: Primary | ICD-10-CM

## 2022-12-01 DIAGNOSIS — D68.52 PROTHROMBIN GENE MUTATION (H): ICD-10-CM

## 2022-12-01 NOTE — PROGRESS NOTES
ANTICOAGULATION MANAGEMENT     Stacey Maki 42 year old female is on warfarin with therapeutic INR result. (Goal INR 2.0-3.0)    Recent labs: (last 7 days)     11/30/22  1739   INR 2.76*       ASSESSMENT       Source(s): Chart Review    Previous INR was Therapeutic last visit; previously outside of goal range    Medication, diet, health changes since last INR chart reviewed; none identified           PLAN     Recommended plan for no diet, medication or health factor changes affecting INR     Dosing Instructions: Continue your current warfarin dose with next INR in 4 weeks       Summary  As of 12/1/2022    Full warfarin instructions:  6 mg every Sun, Wed; 4 mg all other days; Starting 12/1/2022   Next INR check:  12/28/2022             Detailed voice message left for Stacey with dosing instructions and follow up date.   Sent [employer] message with dosing and follow up instructions    Contact [phone] to schedule and with any changes, questions or concerns.     Education provided:     Please call back if any changes to your diet, medications or how you've been taking warfarin    Contact [phone] with any changes, questions or concerns.     Plan made per [facility] anticoagulation protocol    KAYE GILMAN RN  Anticoagulation Clinic  12/1/2022    _______________________________________________________________________     Anticoagulation Episode Summary     Current INR goal:  2.0-3.0   TTR:  71.3 % (1 y)   Target end date:  Indefinite   Send INR reminders to:   [facility]    Indications    Long term current use of anticoagulant therapy [Z79.01]  Prothrombin gene mutation (H) [D68.52]           Comments:           Anticoagulation Care Providers     Provider Role Specialty Phone number    Danny Clark MD Referring Hematology [phone]    Norman Albert PA-C Referring Hematology [phone]

## 2023-01-02 ENCOUNTER — HOSPITAL ENCOUNTER (OUTPATIENT)
Dept: MAMMOGRAPHY | Facility: CLINIC | Age: 43
Discharge: HOME | End: 2023-01-02
Attending: INTERNAL MEDICINE | Admitting: INTERNAL MEDICINE
Payer: COMMERCIAL

## 2023-01-02 DIAGNOSIS — Z12.31 ENCOUNTER FOR SCREENING MAMMOGRAM FOR MALIGNANT NEOPLASM OF BREAST: ICD-10-CM

## 2023-01-02 PROCEDURE — 77067 SCR MAMMO BI INCL CAD: CPT

## 2023-01-04 ENCOUNTER — MYC MEDICAL ADVICE (OUTPATIENT)
Dept: ANTICOAGULATION | Facility: CLINIC | Age: 43
End: 2023-01-04

## 2023-01-06 ENCOUNTER — LAB (OUTPATIENT)
Dept: LAB | Facility: CLINIC | Age: 43
End: 2023-01-06
Payer: COMMERCIAL

## 2023-01-06 ENCOUNTER — ANTICOAGULATION THERAPY VISIT (OUTPATIENT)
Dept: ANTICOAGULATION | Facility: CLINIC | Age: 43
End: 2023-01-06

## 2023-01-06 DIAGNOSIS — Z79.01 LONG TERM CURRENT USE OF ANTICOAGULANT THERAPY: Primary | ICD-10-CM

## 2023-01-06 DIAGNOSIS — D68.52 PROTHROMBIN GENE MUTATION (H): ICD-10-CM

## 2023-01-06 DIAGNOSIS — Z79.01 CURRENT USE OF LONG TERM ANTICOAGULATION: ICD-10-CM

## 2023-01-06 LAB — INR PPP: 2.73 (ref 0.85–1.15)

## 2023-01-06 PROCEDURE — 36415 COLL VENOUS BLD VENIPUNCTURE: CPT

## 2023-01-06 PROCEDURE — 85610 PROTHROMBIN TIME: CPT

## 2023-01-06 NOTE — PROGRESS NOTES
ANTICOAGULATION MANAGEMENT     Stacey Maki 42 year old female is on warfarin with therapeutic INR result. (Goal INR 2.0-3.0)    Recent labs: (last 7 days)     01/06/23  1219   INR 2.73*       ASSESSMENT       Source(s): Chart Review       Warfarin doses taken: Warfarin taken as instructed    Diet: No new diet changes identified    New illness, injury, or hospitalization: No    Medication/supplement changes: None noted    Signs or symptoms of bleeding or clotting: No    Previous INR: Therapeutic last 2(+) visits    Additional findings: None       PLAN     Recommended plan for no diet, medication or health factor changes affecting INR     Dosing Instructions: Continue your current warfarin dose with next INR in 4 weeks       Summary  As of 1/6/2023    Full warfarin instructions:  6 mg every Sun, Wed; 4 mg all other days   Next INR check:  2/3/2023             Detailed voice message left for Stacey with dosing instructions and follow up date.     Contact [phone] to schedule and with any changes, questions or concerns.     Education provided:     Please call back if any changes to your diet, medications or how you've been taking warfarin    Contact [phone] with any changes, questions or concerns.     Plan made per [facility] anticoagulation protocol    Leticia Cho, RN  Anticoagulation Clinic  1/6/2023    _______________________________________________________________________     Anticoagulation Episode Summary     Current INR goal:  2.0-3.0   TTR:  71.4 % (1 y)   Target end date:  Indefinite   Send INR reminders to:   [facility]    Indications    Long term current use of anticoagulant therapy [Z79.01]  Prothrombin gene mutation (H) [D68.52]           Comments:           Anticoagulation Care Providers     Provider Role Specialty Phone number    Danny Clark MD Referring Hematology [phone]    Norman Albert PA-C Referring Hematology [phone]

## 2023-01-12 ENCOUNTER — TELEPHONE (OUTPATIENT)
Dept: HEMATOLOGY | Facility: CLINIC | Age: 43
End: 2023-01-12

## 2023-01-12 DIAGNOSIS — D68.52 PROTHROMBIN GENE MUTATION (H): ICD-10-CM

## 2023-01-12 DIAGNOSIS — Z79.01 LONG TERM CURRENT USE OF ANTICOAGULANT THERAPY: ICD-10-CM

## 2023-01-12 RX ORDER — WARFARIN SODIUM 2 MG/1
TABLET ORAL
Qty: 195 TABLET | Refills: 0 | Status: SHIPPED | OUTPATIENT
Start: 2023-01-12 | End: 2023-04-12

## 2023-01-12 NOTE — TELEPHONE ENCOUNTER
ANTICOAGULATION MANAGEMENT:  Medication Refill    Anticoagulation Summary  As of 1/6/2023    Warfarin maintenance plan:  6 mg (2 mg x 3) every Sun, Wed; 4 mg (2 mg x 2) all other days   Next INR check:  2/3/2023   Target end date:  Indefinite    Indications    Long term current use of anticoagulant therapy [Z79.01]  Prothrombin gene mutation (H) [D68.52]             Anticoagulation Care Providers     Provider Role Specialty Phone number    Danny Clark MD Referring Hematology [phone]    Norman Albert PA-C Referring Hematology [phone]          Visit with referring provider/group within last year: No, last visit date: 1/6/22    [facility] referral signed within last year: Yes    Stacey does NOT meet all criteria for refill: Visit with referring provider's group was >= 1 year ago. 90 day magalis fill approved; patient notified to schedule with referring provider per [facility] protocol      Donte Vo RN  Anticoagulation Clinic

## 2023-01-13 NOTE — PROGRESS NOTES
"[facility]    CC: Preventative Exam    Subjective:   Stacey Maki is a 42 year old  who presents for a preventative health exam.     PMH notable for: history of cerebral venous sinus thrombosis with prothrombin gene mutation on long term anticoagulation with warfarin, HTN, PMDD and MDD/NIKA.    Current concerns:     Has noticed some new tingling and numbness in bilateral fingers, concerned about carpal tunnel vs autoimmune disorder given family history of autoimmune disorder in her mother.    Gained 30 lbs in the last year unintentionally, started Weight Watchers again. Thinks it is related to worsening mood issues/anxiety and alcohol intake. Drinking related to mood symptoms, especially related to family issues/stressors (son with behavioral problems and having a lot of stress from parenting him and trying to get him the help he needs). Has been putting her own needs on the backburner during this time. Drinking 2 drinks Wed and Th and 5-6 drinks Fri and Sat. Trying to cut back but alcohol use is tied strongly to her social events. Sleeping a lot, unsure if she is \"trying to escape\" from the stress at home. Still on Lexapro 20mg daily. Tried therapy years ago, virtual because of pandemic and didn't like it. Has a degree in counseling so finds it difficult to be in therapy sometimes. Feels like she needs therapy but does not know how she wants to go about it and is feeling unmotivated to start.    Her mother had a hysterectomy in her mid-30s for endometriosis and fibroids. She remembers she had an ultrasound about a decade ago that showed a small fibroid. Is this something that she needs to follow-up on?     OB History:    CSx2    Gyn History:   Menses: Menstrual interval occurs every 60 days. Flow lasts for 5-10 days and is heavy in amount of bleeding. Mild cramping. Some spotting last month, otherwise no breakthrough bleeding or postcoital bleeding.   Contraception: vasectomy, " "she is not interested in STI screening today.   Sexual activity: has intercourse with 1 male partner   History of STI: none     Health goals: lose weight, decrease alcohol drinking, improve mental health  Present exercise/diet habits: on Weight Watchers started 1/2/22, not exercising as much as she used to due to fatigue  Domestic violence concerns: none  Mental health: GAD7: 4. PHQ-9: 11.    Screening tests:   Pap smear history: last 11/1/2019 NILM, HPV neg, no hx of abnormals  HIV neg 2010, Hep C never done  Mammogram: 1/2/23 BIRADS-1  Colorectal cancer screening: due when she turns 43 in April 2023  Lipid panel: Last 10/21/22 Cholesterol 215, TG 80,   Diabetes screening: HgbA1c 5.1% 10/21/22    The 10-year ASCVD risk score (Dang CORDERO, et al., 2019) is: 1%    Values used to calculate the score:      Age: 42 years      Sex: Female      Is Non- : No      Diabetic: No      Tobacco smoker: No      Systolic Blood Pressure: 117 mmHg      Is BP treated: Yes      HDL Cholesterol: 54 mg/dL      Total Cholesterol: 215 mg/dL    Immunizations, reviewed:   Does not want influenza. Does want TDaP today.    Medical, surgical and family histories, medications and allergies were reviewed and updated.     Soc Hx: Lives with  and two sons. Denies tobacco use. Alcohol use ~15 drinks per week, trying to cut back as above.    Objective:   /81 (BP Location: Right arm, Patient Position: Sitting, Cuff Size: Adult Regular)   Pulse 85   Ht 1.6 m (5' 3\")   Wt 72.6 kg (160 lb)   BMI 28.34 kg/m    General: Healthy appearing. Alert, oriented. Affect is appropriate.   HEENT: Eyes are normal with clear sclerae. Ears are symmetric.   Neck: No lymphadenopathy. No masses or tenderness.   Breasts: deferred per patient preference  Heart: Regular rate and rhythm without murmurs.   Lungs: Clear to auscultation. Breathing is unlabored on room air.   Abdomen: Soft, nontender, without masses or hernias. "   Skin: Warm and dry without malignant appearing lesions.   Pelvic exam: deferred per patient preference  Extremities: Skin is warm and dry. Lower extremities without varicosities or edema.   Neuro: Motor exam grossly intact, 5/5 strength in upper and lower extremities. Subjective numbness/tingling in hands.    Assessment/Plan: Stacey Maki is a 42 year old  who presents for a preventative health exam.    Health Maintenance:   - Mammography: negative 2023, next due 2024  - Colonoscopy: ordered to be done after she turns 43 in 2023  - Vaccines: TDaP today, declines influenza  - Cholesterol screening: Done recently in 10/2022 with PCP, repeat in one year per PCP  - Diabetes screening: hgb A1c 5.31% in 10/2022 with PCP, repeat in one year per PCP  - Pap smear: next 2024 with HPV cotesting  - Discussed healthy food choices, exercise, vit D and calcium supplementation, mental health, sleep, safety.     PMDD  - currently using norethindrone tabs, would like to continue. Prescription refilled for one year.  - other options include all progesterone only options, discussed  - estrogen options contraindicated d/t thrombophilia    MDD/NIKA  - Continue Lexapro 20mg, recommended meeting with PCP to discuss increasing dose.  - Recommended starting counseling, patient is thinking about how she would like to do this as she did not like who she saw in the [facility] a couple years ago. She will let us know if she needs assistance with this, otherwise will discuss with PCP.    Paresthesias in hands c/f peripheral neuropathy  - Referred back to PCP for workup and management    HTN  - managed by ALYCE Lowry  - Reviewed US report from  showing 2cm fibroid. Given heavy bleeding is currently controlled with norethindrone and no bulk symptoms discussed no surveillance needed.     Prothrombin gene mutation  - follows with heme, on warfarin    Overweight  - discussed diet and exercise changes    Alcohol  use  - discussed strategies to cut back    Follow-up in 1 year, sooner as needed if any concerns.     Tamia Lucero MD  Med-Peds PGY2    I, Laxmi Waggoner MD, examined Stacey aMki on 1/17/2023 with Dr. Lucero and agree with the presentation, exam and plan of care documented in this note with edits by me. I personally reviewed vital signs, labs, imaging and history.    Laxmi Waggoner MD

## 2023-01-16 ASSESSMENT — ENCOUNTER SYMPTOMS
DECREASED APPETITE: 0
DECREASED CONCENTRATION: 1
INSOMNIA: 1
BACK PAIN: 1
JAUNDICE: 0
NERVOUS/ANXIOUS: 0
INCREASED ENERGY: 1
POLYDIPSIA: 0
WEAKNESS: 1
JOINT SWELLING: 1
PARALYSIS: 0
DISTURBANCES IN COORDINATION: 0
BLOATING: 1
WEIGHT GAIN: 1
HALLUCINATIONS: 0
NUMBNESS: 1
DECREASED LIBIDO: 1
WEIGHT LOSS: 0
BLOOD IN STOOL: 0
FATIGUE: 1
HEARTBURN: 1
ARTHRALGIAS: 1
DEPRESSION: 1
SPEECH CHANGE: 0
ALTERED TEMPERATURE REGULATION: 1
MYALGIAS: 1
MUSCLE WEAKNESS: 1
CHILLS: 1
MUSCLE CRAMPS: 1
TINGLING: 1
SEIZURES: 0
HOT FLASHES: 1
DIARRHEA: 1
CONSTIPATION: 1
FEVER: 0
TREMORS: 0
NECK PAIN: 1
POLYPHAGIA: 1
PANIC: 0
MEMORY LOSS: 1
NIGHT SWEATS: 1
VOMITING: 0
ABDOMINAL PAIN: 0
LOSS OF CONSCIOUSNESS: 0
BOWEL INCONTINENCE: 0
HEADACHES: 0
RECTAL PAIN: 0
NAUSEA: 0
DIZZINESS: 0
STIFFNESS: 1

## 2023-01-16 ASSESSMENT — ANXIETY QUESTIONNAIRES
3. WORRYING TOO MUCH ABOUT DIFFERENT THINGS: SEVERAL DAYS
1. FEELING NERVOUS, ANXIOUS, OR ON EDGE: SEVERAL DAYS
IF YOU CHECKED OFF ANY PROBLEMS ON THIS QUESTIONNAIRE, HOW DIFFICULT HAVE THESE PROBLEMS MADE IT FOR YOU TO DO YOUR WORK, TAKE CARE OF THINGS AT HOME, OR GET ALONG WITH OTHER PEOPLE: SOMEWHAT DIFFICULT
5. BEING SO RESTLESS THAT IT IS HARD TO SIT STILL: NOT AT ALL
GAD7 TOTAL SCORE: 4
8. IF YOU CHECKED OFF ANY PROBLEMS, HOW DIFFICULT HAVE THESE MADE IT FOR YOU TO DO YOUR WORK, TAKE CARE OF THINGS AT HOME, OR GET ALONG WITH OTHER PEOPLE?: SOMEWHAT DIFFICULT
GAD7 TOTAL SCORE: 4
7. FEELING AFRAID AS IF SOMETHING AWFUL MIGHT HAPPEN: NOT AT ALL
7. FEELING AFRAID AS IF SOMETHING AWFUL MIGHT HAPPEN: NOT AT ALL
4. TROUBLE RELAXING: NOT AT ALL
2. NOT BEING ABLE TO STOP OR CONTROL WORRYING: NOT AT ALL
6. BECOMING EASILY ANNOYED OR IRRITABLE: MORE THAN HALF THE DAYS

## 2023-01-17 ENCOUNTER — OFFICE VISIT (OUTPATIENT)
Dept: OBGYN | Facility: CLINIC | Age: 43
End: 2023-01-17
Attending: STUDENT IN AN ORGANIZED HEALTH CARE EDUCATION/TRAINING PROGRAM
Payer: COMMERCIAL

## 2023-01-17 VITALS
BODY MASS INDEX: 28.35 KG/M2 | HEIGHT: 63 IN | DIASTOLIC BLOOD PRESSURE: 81 MMHG | WEIGHT: 160 LBS | HEART RATE: 85 BPM | SYSTOLIC BLOOD PRESSURE: 117 MMHG

## 2023-01-17 DIAGNOSIS — Z00.00 VISIT FOR PREVENTIVE HEALTH EXAMINATION: ICD-10-CM

## 2023-01-17 DIAGNOSIS — F32.81 PREMENSTRUAL DYSPHORIC DISORDER: ICD-10-CM

## 2023-01-17 DIAGNOSIS — Z12.11 SCREENING FOR COLON CANCER: ICD-10-CM

## 2023-01-17 DIAGNOSIS — Z79.01 LONG TERM CURRENT USE OF ANTICOAGULANT THERAPY: ICD-10-CM

## 2023-01-17 DIAGNOSIS — Z01.419 ENCOUNTER FOR WELL WOMAN EXAM: Primary | ICD-10-CM

## 2023-01-17 DIAGNOSIS — I10 BENIGN ESSENTIAL HYPERTENSION: ICD-10-CM

## 2023-01-17 DIAGNOSIS — F33.0 MILD EPISODE OF RECURRENT MAJOR DEPRESSIVE DISORDER (H): ICD-10-CM

## 2023-01-17 DIAGNOSIS — F41.1 GAD (GENERALIZED ANXIETY DISORDER): ICD-10-CM

## 2023-01-17 DIAGNOSIS — D68.52 PROTHROMBIN GENE MUTATION (H): ICD-10-CM

## 2023-01-17 PROCEDURE — 250N000011 HC RX IP 250 OP 636

## 2023-01-17 PROCEDURE — G0463 HOSPITAL OUTPT CLINIC VISIT: HCPCS | Performed by: STUDENT IN AN ORGANIZED HEALTH CARE EDUCATION/TRAINING PROGRAM

## 2023-01-17 PROCEDURE — 90715 TDAP VACCINE 7 YRS/> IM: CPT

## 2023-01-17 PROCEDURE — 90471 IMMUNIZATION ADMIN: CPT

## 2023-01-17 RX ORDER — ACETAMINOPHEN AND CODEINE PHOSPHATE 120; 12 MG/5ML; MG/5ML
0.35 SOLUTION ORAL DAILY
Qty: 84 TABLET | Refills: 4 | Status: SHIPPED | OUTPATIENT
Start: 2023-01-17 | End: 2024-01-24

## 2023-01-17 ASSESSMENT — PATIENT HEALTH QUESTIONNAIRE - PHQ9: SUM OF ALL RESPONSES TO PHQ QUESTIONS 1-9: 11

## 2023-01-17 NOTE — NURSING NOTE
Chief Complaint   Patient presents with     Physical     Last pap was 11.01.2019 NILM , Neg hpv

## 2023-01-17 NOTE — LETTER
"2023       RE: Stacey Maki  [address] [place] [address]     Dear Colleague,    Thank you for referring your patient, Stacey Maki, to the [facility] at [facility]. Please see a copy of my visit note below.    [facility]    CC: Preventative Exam    Subjective:   Stacey Maki is a 42 year old  who presents for a preventative health exam.     PMH notable for: history of cerebral venous sinus thrombosis with prothrombin gene mutation on long term anticoagulation with warfarin, HTN, PMDD and MDD/NIKA.    Current concerns:     Has noticed some new tingling and numbness in bilateral fingers, concerned about carpal tunnel vs autoimmune disorder given family history of autoimmune disorder in her mother.    Gained 30 lbs in the last year unintentionally, started Weight Watchers again. Thinks it is related to worsening mood issues/anxiety and alcohol intake. Drinking related to mood symptoms, especially related to family issues/stressors (son with behavioral problems and having a lot of stress from parenting him and trying to get him the help he needs). Has been putting her own needs on the backburner during this time. Drinking 2 drinks Wed and Th and 5-6 drinks Fri and Sat. Trying to cut back but alcohol use is tied strongly to her social events. Sleeping a lot, unsure if she is \"trying to escape\" from the stress at home. Still on Lexapro 20mg daily. Tried therapy years ago, virtual because of pandemic and didn't like it. Has a degree in counseling so finds it difficult to be in therapy sometimes. Feels like she needs therapy but does not know how she wants to go about it and is feeling unmotivated to start.    Her mother had a hysterectomy in her mid-30s for endometriosis and fibroids. She remembers she had an ultrasound about a decade ago that showed a small fibroid. Is this " "something that she needs to follow-up on?     OB History:    CSx2    Gyn History:   Menses: Menstrual interval occurs every 60 days. Flow lasts for 5-10 days and is heavy in amount of bleeding. Mild cramping. Some spotting last month, otherwise no breakthrough bleeding or postcoital bleeding.   Contraception: vasectomy, she is not interested in STI screening today.   Sexual activity: has intercourse with 1 male partner   History of STI: none     Health goals: lose weight, decrease alcohol drinking, improve mental health  Present exercise/diet habits: on Weight Watchers started 22, not exercising as much as she used to due to fatigue  Domestic violence concerns: none  Mental health: GAD7: 4. PHQ-9: 11.    Screening tests:   Pap smear history: last 2019 NILM, HPV neg, no hx of abnormals  HIV neg , Hep C never done  Mammogram: 23 BIRADS-1  Colorectal cancer screening: due when she turns 43 in 2023  Lipid panel: Last 10/21/22 Cholesterol 215, TG 80,   Diabetes screening: HgbA1c 5.1% 10/21/22    The 10-year ASCVD risk score (Dang CORDERO, et al., 2019) is: 1%    Values used to calculate the score:      Age: 42 years      Sex: Female      Is Non- : No      Diabetic: No      Tobacco smoker: No      Systolic Blood Pressure: 117 mmHg      Is BP treated: Yes      HDL Cholesterol: 54 mg/dL      Total Cholesterol: 215 mg/dL    Immunizations, reviewed:   Does not want influenza. Does want TDaP today.    Medical, surgical and family histories, medications and allergies were reviewed and updated.     Soc Hx: Lives with  and two sons. Denies tobacco use. Alcohol use ~15 drinks per week, trying to cut back as above.    Objective:   /81 (BP Location: Right arm, Patient Position: Sitting, Cuff Size: Adult Regular)   Pulse 85   Ht 1.6 m (5' 3\")   Wt 72.6 kg (160 lb)   BMI 28.34 kg/m    General: Healthy appearing. Alert, oriented. Affect is appropriate.   HEENT: " Eyes are normal with clear sclerae. Ears are symmetric.   Neck: No lymphadenopathy. No masses or tenderness.   Breasts: deferred per patient preference  Heart: Regular rate and rhythm without murmurs.   Lungs: Clear to auscultation. Breathing is unlabored on room air.   Abdomen: Soft, nontender, without masses or hernias.   Skin: Warm and dry without malignant appearing lesions.   Pelvic exam: deferred per patient preference  Extremities: Skin is warm and dry. Lower extremities without varicosities or edema.   Neuro: Motor exam grossly intact, 5/5 strength in upper and lower extremities. Subjective numbness/tingling in hands.    Assessment/Plan: Stacey Maki is a 42 year old  who presents for a preventative health exam.    Health Maintenance:   - Mammography: negative 2023, next due 2024  - Colonoscopy: ordered to be done after she turns 43 in 2023  - Vaccines: TDaP today, declines influenza  - Cholesterol screening: Done recently in 10/2022 with PCP, repeat in one year per PCP  - Diabetes screening: hgb A1c 5.31% in 10/2022 with PCP, repeat in one year per PCP  - Pap smear: next 2024 with HPV cotesting  - Discussed healthy food choices, exercise, vit D and calcium supplementation, mental health, sleep, safety.     PMDD  - currently using norethindrone tabs, would like to continue. Prescription refilled for one year.  - other options include all progesterone only options, discussed  - estrogen options contraindicated d/t thrombophilia    MDD/NIKA  - Continue Lexapro 20mg, recommended meeting with PCP to discuss increasing dose.  - Recommended starting counseling, patient is thinking about how she would like to do this as she did not like who she saw in the [facility] a couple years ago. She will let us know if she needs assistance with this, otherwise will discuss with PCP.    Paresthesias in hands c/f peripheral neuropathy  - Referred back to PCP for workup and management    HTN  -  managed by IM    Fibroid  - Reviewed US report from 2013 showing 2cm fibroid. Given heavy bleeding is currently controlled with norethindrone and no bulk symptoms discussed no surveillance needed.     Prothrombin gene mutation  - follows with heme, on warfarin    Overweight  - discussed diet and exercise changes    Alcohol use  - discussed strategies to cut back    Follow-up in 1 year, sooner as needed if any concerns.     Tamia Lucero MD  Med-Peds PGY2    I, Laxmi Waggoner MD, examined Stacey Maki on 1/17/2023 with Dr. Lucero and agree with the presentation, exam and plan of care documented in this note with edits by me. I personally reviewed vital signs, labs, imaging and history.    Laxmi Waggoner MD

## 2023-01-31 NOTE — PROGRESS NOTES
[facility]  [facility]  [facility] [address], [address], [place] [address]  Main: [phone], Fax: [phone]    Video Virtual Visit Note:    Patient: Stacey Maki  MRN: [id number]  : 1980  MARTA: 2023        Due to the ongoing COVID-19 outbreak, this visit was conducted by video, with the patient's approval.      Reason of today's visit:  History of cerebral venous sinus thrombosis on long term anticoagulation therapy. Here for her routine annual follow up clinic visit.      Clinical History Summary:  Stacey Maki is a 42-year-old woman with a history of cerebral venous sinus thrombosis, currently on indefinite anticoagulation therapy with Coumadin, participates in today's scheduled video visit for her routine annual follow up visit. She was last seen by this writer back in 2022.     Thrombosis History Summary:    Estrogen provoked cerebral venous sinus thrombosis in . Found to have heterozygous prothrombin gene mutation.     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 [facility]. According to Dr. Barragan's note on 2013, she has chronic left sigmoid sinus occlusive and occlusion in the upper part of the left internal jugular vein. Additionally, there is a superficial vein that is now draining this sinus. She dose have flow through the right sigmoid sinus, but there are areas of stenosis and irregularity, including an area of stenosis in the high right jugular vein.    She has been on indefinite anticoagulation therapy with warfarin since diagnosis of cerebral sinous thrombosis with INR goal range of 2.0-3.0.      Interim History:  She is currently on warfarin. Denies any significant bleeding issues.    She reports that she is currently on progesterone OCP and she is getting her  period every 2 months and reports that they are rather heavy.     She has had no invasive or surgical procedures in the past year. She is turning 43 as of April 2023, she is thinking of getting a screening colonoscopy around late Spring or in the Summer.     ROS:  Denies any bleeding complications. Specifically, no frequent epistaxis. No issues with oral mucosal bleeding. Denies any hematuria or blood in stools. Denies any shortness of breath. No chest pain. No cough. No fever.    Medications:   Current Outpatient Medications   Medication     acetaminophen (TYLENOL) 500 MG tablet     escitalopram (LEXAPRO) 20 MG tablet     losartan (COZAAR) 25 MG tablet     norethindrone (MICRONOR) 0.35 MG tablet     warfarin ANTICOAGULANT (COUMADIN) 2 MG tablet     No current facility-administered medications for this visit.       Allergies:   Allergies   Allergen Reactions     Benzoyl Peroxide Swelling     Swollen eyelids     Seasonal Allergies Other (See Comments)     Runny nose, itchy eyes, breathing issues, and fatigue     Adhesive Tape Dermatitis        PMH:   Past Medical History:   Diagnosis Date     Arteriovenous malformation      Asthma      Chronic cerebral venous sinus thrombosis     diagnosed in 2007, 2nd 2009 5wk preg.      Heart disease     mother     Hypertension 2016    mother     Idiopathic intracranial hypertension      Papilledema      Prothrombin gene mutation (H)     heterozygous type     Tension headache        Social History:   Deferred.     Family History:  Deferred.     Objective:  Visual Examination via Video:  Pleasant in no acute distress.  Normal work of breathing   A+O x 3    Labs:  Component      Latest Ref Rng & Units 6/21/2022 7/26/2022 9/8/2022 10/21/2022   INR      0.85 - 1.15 2.35 (H) 3.01 (H) 3.43 (H) 2.32 (H)     Component      Latest Ref Rng & Units 11/30/2022 1/6/2023   INR      0.85 - 1.15 2.76 (H) 2.73 (H)     Component      Latest Ref Rng & Units 10/21/2022   Sodium      136 - 145 mmol/L  135 (L)   Potassium      3.4 - 5.3 mmol/L 4.2   Chloride      98 - 107 mmol/L 104   Carbon Dioxide (CO2)      22 - 29 mmol/L 19 (L)   Anion Gap      7 - 15 mmol/L 12   Urea Nitrogen      6.0 - 20.0 mg/dL 8.3   Creatinine      0.51 - 0.95 mg/dL 0.71   Calcium      8.6 - 10.0 mg/dL 8.8   Glucose      70 - 99 mg/dL 89   GFR Estimate      >60 mL/min/1.73m2 >90       Assessment:  In summary, Stacey is a 42 year old female with a history of cerebral sinus thrombosis back in 2007 that was estrogen provoked who has been on indefinite anticoagulation therapy with Coumadin after she was found to have abnormal cerebral venous flow, participates in today's scheduled video visit for her routine annual follow up visit in regard to chronic anticoagulation therapy use. Her last visit with this writer was on 1/6/2022.     Stacey has remain on Coumadin as her mainstay of indefinite anticoagulation therapy with her goal INR of 2.0-3.0. She has done very well with this and has no issues with bleeding complications. Historically, she has not been interested in switching to any of the direct oral anticoagulant agents. However, this changes with today's discussion, see Plan below.     Diagnosis:  1. History of Cerebral Sinus Thrombosis.  2. Heterozygous Prothrombin Gene Mutation.  3. On chronic anticoagulation therapy with Coumadin.     Plan:  Stacey remains to be a good candidate to stay on indefinite anticoagulation therapy.     I again bring up the fact that she can potentially switch over to a direct oral anticoagulant agent (DOAC). She has no contraindications for DOAC. However, I explain to her that currently there are not a lot of data about the effectiveness of DOAC for prevention of recurrent cerebral sinus thrombosis. But I indicate to her that from our clinic's experience, some of our patients with cerebral sinus thrombosis who are also on long term anticoagulation therapy and who are on DOAC are mostly do well without any recurrent  events.     After answering all Stacey's questions to her satisfaction about DOAC vs warfarin and after discussing with her the pros and cons of DOAC vs warfarin, she would like to switch over to one of the DOACs after she has completed her supply of warfarin currently on hand (about 3 months worth). Thus the plan is as follow:    She is to continue with warfarin for now until she close to running out of her warfarin supply.     She is to call us when she is about to run out of her warfarin supply and we will have her get an INR and then determine a plan of switching her over to rivaroxaban at 20 mg PO Qday at that time.     I will have her get CBC, iron panel and hepatic panel with her next INR check.     She is instructed to call our clinic if she should experience any unusual bleeding issues or if she should need any invasive or surgical procedures in the future. Otherwise, I will plan on seeing her back in one year time for follow up.     Video-Visit Details:  Type of service:  Video Visit  Video Start Time: 13:33  Video End Time (time video stopped): 13:56  Originating Location (pt. Location): Home  Distant Location (provider location):  [facility]   Mode of Communication:  Video Conference via Santhosh Albert PA-C, MPAS  Physician Assistant  [facility] for [facility].     36 minutes spent on the date of the encounter doing chart review, history and exam, documentation and further activities per the note

## 2023-02-02 ENCOUNTER — VIRTUAL VISIT (OUTPATIENT)
Dept: HEMATOLOGY | Facility: CLINIC | Age: 43
End: 2023-02-02
Attending: PHYSICIAN ASSISTANT
Payer: COMMERCIAL

## 2023-02-02 DIAGNOSIS — Z86.718 HISTORY OF CEREBRAL VENOUS SINUS THROMBOSIS: ICD-10-CM

## 2023-02-02 DIAGNOSIS — Z79.01 LONG TERM CURRENT USE OF ANTICOAGULANT THERAPY: Primary | ICD-10-CM

## 2023-02-02 DIAGNOSIS — D68.52 PROTHROMBIN GENE MUTATION (H): ICD-10-CM

## 2023-02-02 DIAGNOSIS — Z79.01 CURRENT USE OF LONG TERM ANTICOAGULATION: ICD-10-CM

## 2023-02-02 PROCEDURE — 99214 OFFICE O/P EST MOD 30 MIN: CPT | Mod: 95 | Performed by: PHYSICIAN ASSISTANT

## 2023-02-02 NOTE — PATIENT INSTRUCTIONS
Stacey,     It was nice to see you via video visit today.     Below is a summary of what we have discussed:  Continue with warfarin for now.   Call us once you almost run out of your warfarin supply. At which time, I will prescribe your rivaroxaban (Xarelto) tablets and have you get a more recent INR to determine the plan as to how to switch you from warfarin to Xarelto at that time.   Please get these labs done along with your next INR check: complete blood count (CBC), Iron panel and hepatic panel.   If you should have any further questions, or experience any unusual bleeding issues or if you should need any invasive or surgical procedures in the future, please call us at [phone] and ask to speak to a nursing staff.  One of our administrative assistants will contact you to schedule your return visit with me in one year.     Thank you once again in choosing our clinic as part of your healthcare team.      Norman Albert PA-C, MPAS  Physician Assistant  [facility] for Ble[facility]ot[facility].

## 2023-02-02 NOTE — PROGRESS NOTES
Patient was contacted to complete the pre-visit call prior to their video visit with the provider.      Allergies and medications were reviewed.    I thanked them for their time to cover this information     Vince Duong CMA

## 2023-02-14 ENCOUNTER — ANTICOAGULATION THERAPY VISIT (OUTPATIENT)
Dept: ANTICOAGULATION | Facility: CLINIC | Age: 43
End: 2023-02-14

## 2023-02-14 ENCOUNTER — LAB (OUTPATIENT)
Dept: LAB | Facility: CLINIC | Age: 43
End: 2023-02-14
Payer: COMMERCIAL

## 2023-02-14 DIAGNOSIS — Z86.718 HISTORY OF CEREBRAL VENOUS SINUS THROMBOSIS: ICD-10-CM

## 2023-02-14 DIAGNOSIS — D68.52 PROTHROMBIN GENE MUTATION (H): ICD-10-CM

## 2023-02-14 DIAGNOSIS — I10 BENIGN ESSENTIAL HYPERTENSION: ICD-10-CM

## 2023-02-14 DIAGNOSIS — Z79.01 LONG TERM CURRENT USE OF ANTICOAGULANT THERAPY: ICD-10-CM

## 2023-02-14 DIAGNOSIS — Z79.01 LONG TERM CURRENT USE OF ANTICOAGULANT THERAPY: Primary | ICD-10-CM

## 2023-02-14 DIAGNOSIS — Z79.01 CURRENT USE OF LONG TERM ANTICOAGULATION: ICD-10-CM

## 2023-02-14 LAB
ALBUMIN SERPL BCG-MCNC: 4.3 G/DL (ref 3.5–5.2)
ALP SERPL-CCNC: 22 U/L (ref 35–104)
ALT SERPL W P-5'-P-CCNC: 13 U/L (ref 10–35)
ANION GAP SERPL CALCULATED.3IONS-SCNC: 11 MMOL/L (ref 7–15)
AST SERPL W P-5'-P-CCNC: 18 U/L (ref 10–35)
BILIRUB DIRECT SERPL-MCNC: <0.2 MG/DL (ref 0–0.3)
BILIRUB SERPL-MCNC: 0.2 MG/DL
BUN SERPL-MCNC: 8.3 MG/DL (ref 6–20)
CALCIUM SERPL-MCNC: 8.9 MG/DL (ref 8.6–10)
CHLORIDE SERPL-SCNC: 102 MMOL/L (ref 98–107)
CREAT SERPL-MCNC: 0.67 MG/DL (ref 0.51–0.95)
DEPRECATED HCO3 PLAS-SCNC: 23 MMOL/L (ref 22–29)
ERYTHROCYTE [DISTWIDTH] IN BLOOD BY AUTOMATED COUNT: 14.7 % (ref 10–15)
FERRITIN SERPL-MCNC: 12 NG/ML (ref 6–175)
GFR SERPL CREATININE-BSD FRML MDRD: >90 ML/MIN/1.73M2
GLUCOSE SERPL-MCNC: 126 MG/DL (ref 70–99)
HCT VFR BLD AUTO: 39.7 % (ref 35–47)
HGB BLD-MCNC: 13.2 G/DL (ref 11.7–15.7)
INR PPP: 2.02 (ref 0.85–1.15)
IRON BINDING CAPACITY (ROCHE): 351 UG/DL (ref 240–430)
IRON SATN MFR SERPL: 21 % (ref 15–46)
IRON SERPL-MCNC: 73 UG/DL (ref 37–145)
MCH RBC QN AUTO: 29.9 PG (ref 26.5–33)
MCHC RBC AUTO-ENTMCNC: 33.2 G/DL (ref 31.5–36.5)
MCV RBC AUTO: 90 FL (ref 78–100)
PLATELET # BLD AUTO: 203 10E3/UL (ref 150–450)
POTASSIUM SERPL-SCNC: 3.8 MMOL/L (ref 3.4–5.3)
PROT SERPL-MCNC: 7 G/DL (ref 6.4–8.3)
RBC # BLD AUTO: 4.42 10E6/UL (ref 3.8–5.2)
SODIUM SERPL-SCNC: 136 MMOL/L (ref 136–145)
WBC # BLD AUTO: 6.4 10E3/UL (ref 4–11)

## 2023-02-14 PROCEDURE — 85027 COMPLETE CBC AUTOMATED: CPT

## 2023-02-14 PROCEDURE — 82570 ASSAY OF URINE CREATININE: CPT

## 2023-02-14 PROCEDURE — 36415 COLL VENOUS BLD VENIPUNCTURE: CPT

## 2023-02-14 PROCEDURE — 82248 BILIRUBIN DIRECT: CPT

## 2023-02-14 PROCEDURE — 82728 ASSAY OF FERRITIN: CPT

## 2023-02-14 PROCEDURE — 80053 COMPREHEN METABOLIC PANEL: CPT

## 2023-02-14 PROCEDURE — 85610 PROTHROMBIN TIME: CPT

## 2023-02-14 PROCEDURE — 82374 ASSAY BLOOD CARBON DIOXIDE: CPT

## 2023-02-14 PROCEDURE — 83550 IRON BINDING TEST: CPT

## 2023-02-14 PROCEDURE — 82310 ASSAY OF CALCIUM: CPT

## 2023-02-14 NOTE — PROGRESS NOTES
ANTICOAGULATION MANAGEMENT     Stacey Maki 42 year old female is on warfarin with therapeutic INR result. (Goal INR 2.0-3.0)    Recent labs: (last 7 days)     02/14/23  1644   INR 2.02*       ASSESSMENT       Source(s): Chart Review    Previous INR was Therapeutic last 2(+) visits    Medication, diet, health changes since last INR chart reviewed; none identified           PLAN     Recommended plan for no diet, medication or health factor changes affecting INR     Dosing Instructions: Continue your current warfarin dose with next INR in 4 weeks       Summary  As of 2/14/2023    Full warfarin instructions:  6 mg every Sun, Wed; 4 mg all other days   Next INR check:  3/14/2023             Detailed voice message left for Stacey with dosing instructions and follow up date.     Contact [phone] to schedule and with any changes, questions or concerns.     Education provided:     Please call back if any changes to your diet, medications or how you've been taking warfarin    Contact [phone] with any changes, questions or concerns.     Plan made per [facility] anticoagulation protocol    Praveena Blackwood RN  Anticoagulation Clinic  2/14/2023    _______________________________________________________________________     Anticoagulation Episode Summary     Current INR goal:  2.0-3.0   TTR:  81.0 % (1 y)   Target end date:  Indefinite   Send INR reminders to:  [facility]    Indications    Long term current use of anticoagulant therapy [Z79.01]  Prothrombin gene mutation (H) [D68.52]           Comments:           Anticoagulation Care Providers     Provider Role Specialty Phone number    Danny Clark MD Referring Hematology [phone]    Norman Albert PA-C Referring Hematology [phone]

## 2023-02-15 LAB
CREAT UR-MCNC: 46.7 MG/DL
MICROALBUMIN UR-MCNC: <12 MG/L
MICROALBUMIN/CREAT UR: NORMAL MG/G{CREAT}

## 2023-04-12 ENCOUNTER — TELEPHONE (OUTPATIENT)
Dept: HEMATOLOGY | Facility: CLINIC | Age: 43
End: 2023-04-12

## 2023-04-12 DIAGNOSIS — D68.52 PROTHROMBIN GENE MUTATION (H): ICD-10-CM

## 2023-04-12 DIAGNOSIS — Z79.01 LONG TERM CURRENT USE OF ANTICOAGULANT THERAPY: ICD-10-CM

## 2023-04-12 RX ORDER — WARFARIN SODIUM 2 MG/1
TABLET ORAL
Qty: 252 TABLET | Refills: 1 | Status: SHIPPED | OUTPATIENT
Start: 2023-04-12 | End: 2023-04-12

## 2023-04-12 NOTE — TELEPHONE ENCOUNTER
ANTICOAGULATION MANAGEMENT:  Medication Refill    Anticoagulation Summary  As of 2/14/2023    Warfarin maintenance plan:  6 mg (2 mg x 3) every Sun, Wed; 4 mg (2 mg x 2) all other days   Next INR check:  3/14/2023   Target end date:  Indefinite    Indications    Long term current use of anticoagulant therapy [Z79.01]  Prothrombin gene mutation (H) [D68.52]             Anticoagulation Care Providers     Provider Role Specialty Phone number    Danny Clark MD Referring Hematology [phone]    Norman Albert PA-C Referring Hematology [phone]          Visit with referring provider/group within last year: Yes    [facility] referral signed within last year: Yes    Stacey meets all criteria for refill (current [facility] referral, office visit with referring provider/group in last year, lab monitoring up to date or not exceeding 2 weeks overdue). Rx instructions and quantity supplied updated to match patient's current dosing plan. Warfarin 90 day supply with 1 refill granted per [facility] protocol     KAYE GILMAN RN  Anticoagulation Clinic

## 2023-04-12 NOTE — TELEPHONE ENCOUNTER
"[id number]  Stacey Maki  43 year old female  [facility] Diagnosis: cerebral venous sinus thrombosis  [facility]CD Provider: Norman Albert PA-C    Incoming medication request for Warfarin refill.     Stacey last saw Norman Albert PA-C on 2/02/23. Plan was for patient to \"call us when she is about to run out of her warfarin supply and we will have her get an INR and then determine a plan of switching her over to rivaroxaban at 20 mg PO Qday at that time.\"     RN called patient to see if she was running out of Warfarin/wanted to switch to Rivaroxaban. Stacey wants to stay on warfarin for now and switch to Rivaroxaban after her colonoscopy this fall (as she will be having to hold warfarin for the procedure). The colonoscopy is not scheduled yet. She will call [facility] when the colonoscopy is ready/when she is ready to switch to Rivaroxaban.    Gill Escudero RN, BSN, PCCN  Nurse Clinician    [facility]  [address] [place] [address], [address], [place] [address]   Office, direct: [phone]  Main office number: [phone]  Pronouns: She, her, hers        "

## 2023-04-20 ENCOUNTER — ANTICOAGULATION THERAPY VISIT (OUTPATIENT)
Dept: ANTICOAGULATION | Facility: CLINIC | Age: 43
End: 2023-04-20

## 2023-04-20 ENCOUNTER — LAB (OUTPATIENT)
Dept: LAB | Facility: CLINIC | Age: 43
End: 2023-04-20
Payer: COMMERCIAL

## 2023-04-20 ENCOUNTER — MYC MEDICAL ADVICE (OUTPATIENT)
Dept: ANTICOAGULATION | Facility: CLINIC | Age: 43
End: 2023-04-20

## 2023-04-20 DIAGNOSIS — D68.52 PROTHROMBIN GENE MUTATION (H): ICD-10-CM

## 2023-04-20 DIAGNOSIS — Z79.01 LONG TERM CURRENT USE OF ANTICOAGULANT THERAPY: Primary | ICD-10-CM

## 2023-04-20 DIAGNOSIS — Z79.01 CURRENT USE OF LONG TERM ANTICOAGULATION: ICD-10-CM

## 2023-04-20 LAB — INR PPP: 2.91 (ref 0.85–1.15)

## 2023-04-20 PROCEDURE — 85610 PROTHROMBIN TIME: CPT

## 2023-04-20 PROCEDURE — 36415 COLL VENOUS BLD VENIPUNCTURE: CPT

## 2023-04-20 NOTE — PROGRESS NOTES
ANTICOAGULATION MANAGEMENT     Stacey Maki 43 year old female is on warfarin with therapeutic INR result. (Goal INR 2.0-3.0)    Recent labs: (last 7 days)     04/20/23  1551   INR 2.91*       ASSESSMENT       Source(s): Chart Review       Warfarin doses taken: Reviewed in chart    Diet: No new diet changes identified    Medication/supplement changes: None noted    New illness, injury, or hospitalization: No    Signs or symptoms of bleeding or clotting: No    Previous INR: Therapeutic last 2(+) visits    Additional findings: None         PLAN     Recommended plan for no diet, medication or health factor changes affecting INR     Dosing Instructions: Continue your current warfarin dose with next INR in 4 weeks       Summary  As of 4/20/2023    Full warfarin instructions:  6 mg every Sun, Wed; 4 mg all other days   Next INR check:  5/18/2023             Detailed voice message left for Stacey with dosing instructions and follow up date.     Contact [phone] to schedule and with any changes, questions or concerns.     Education provided:     Please call back if any changes to your diet, medications or how you've been taking warfarin    Contact [phone] with any changes, questions or concerns.     Plan made per [facility] anticoagulation protocol    Leticia Cho RN  Anticoagulation Clinic  4/20/2023    _______________________________________________________________________     Anticoagulation Episode Summary     Current INR goal:  2.0-3.0   TTR:  83.3 % (1 y)   Target end date:  Indefinite   Send INR reminders to:  [facility]    Indications    Long term current use of anticoagulant therapy [Z79.01]  Prothrombin gene mutation (H) [D68.52]           Comments:           Anticoagulation Care Providers     Provider Role Specialty Phone number    Danny Clark MD Referring Hematology [phone]    Norman Albert PA-C Referring Hematology [phone]

## 2023-04-24 ENCOUNTER — OFFICE VISIT (OUTPATIENT)
Dept: URGENT CARE | Facility: URGENT CARE | Age: 43
End: 2023-04-24
Payer: COMMERCIAL

## 2023-04-24 VITALS
HEART RATE: 77 BPM | DIASTOLIC BLOOD PRESSURE: 86 MMHG | WEIGHT: 160 LBS | SYSTOLIC BLOOD PRESSURE: 145 MMHG | BODY MASS INDEX: 28.34 KG/M2 | OXYGEN SATURATION: 99 % | TEMPERATURE: 98 F

## 2023-04-24 DIAGNOSIS — J31.2 CHRONIC SORE THROAT: ICD-10-CM

## 2023-04-24 DIAGNOSIS — I10 BENIGN ESSENTIAL HYPERTENSION: ICD-10-CM

## 2023-04-24 DIAGNOSIS — R07.0 THROAT PAIN: Primary | ICD-10-CM

## 2023-04-24 DIAGNOSIS — Z79.01 LONG TERM CURRENT USE OF ANTICOAGULANT THERAPY: ICD-10-CM

## 2023-04-24 LAB
BASOPHILS # BLD AUTO: 0 10E3/UL (ref 0–0.2)
BASOPHILS NFR BLD AUTO: 0 %
DEPRECATED S PYO AG THROAT QL EIA: NEGATIVE
EOSINOPHIL # BLD AUTO: 0.1 10E3/UL (ref 0–0.7)
EOSINOPHIL NFR BLD AUTO: 2 %
ERYTHROCYTE [DISTWIDTH] IN BLOOD BY AUTOMATED COUNT: 14.1 % (ref 10–15)
HCT VFR BLD AUTO: 41.4 % (ref 35–47)
HGB BLD-MCNC: 13.7 G/DL (ref 11.7–15.7)
LYMPHOCYTES # BLD AUTO: 1.8 10E3/UL (ref 0.8–5.3)
LYMPHOCYTES NFR BLD AUTO: 32 %
MCH RBC QN AUTO: 30.9 PG (ref 26.5–33)
MCHC RBC AUTO-ENTMCNC: 33.1 G/DL (ref 31.5–36.5)
MCV RBC AUTO: 93 FL (ref 78–100)
MONOCYTES # BLD AUTO: 0.4 10E3/UL (ref 0–1.3)
MONOCYTES NFR BLD AUTO: 8 %
MONOCYTES NFR BLD AUTO: NEGATIVE %
NEUTROPHILS # BLD AUTO: 3.3 10E3/UL (ref 1.6–8.3)
NEUTROPHILS NFR BLD AUTO: 58 %
PLATELET # BLD AUTO: 224 10E3/UL (ref 150–450)
RBC # BLD AUTO: 4.44 10E6/UL (ref 3.8–5.2)
WBC # BLD AUTO: 5.6 10E3/UL (ref 4–11)

## 2023-04-24 PROCEDURE — 36415 COLL VENOUS BLD VENIPUNCTURE: CPT | Performed by: PHYSICIAN ASSISTANT

## 2023-04-24 PROCEDURE — 86308 HETEROPHILE ANTIBODY SCREEN: CPT | Performed by: PHYSICIAN ASSISTANT

## 2023-04-24 PROCEDURE — 87651 STREP A DNA AMP PROBE: CPT | Performed by: PHYSICIAN ASSISTANT

## 2023-04-24 PROCEDURE — 99214 OFFICE O/P EST MOD 30 MIN: CPT | Performed by: PHYSICIAN ASSISTANT

## 2023-04-24 PROCEDURE — 85025 COMPLETE CBC W/AUTO DIFF WBC: CPT | Performed by: PHYSICIAN ASSISTANT

## 2023-04-24 NOTE — PROGRESS NOTES
"  Assessment & Plan     Throat pain    You or your child have a sore throat (pharyngitis). This infection is caused by a virus. It can cause throat pain that is worse when swallowing, aching all over, headache, and fever. The infection may be spread by coughing, kissing, or touching others after touching your mouth or nose. Antibiotic medicines don't work against viruses. They are not used for treating this illness.     Strep neg, culture pending  Mono neg  CBC normal    Magic mouthwash prn throat pain    - Streptococcus A Rapid Screen w/Reflex to PCR - Clinic Collect  - Mononucleosis screen; Future  - CBC with platelets and differential; Future  - Mononucleosis screen  - CBC with platelets and differential  - Group A Streptococcus PCR Throat Swab  - magic mouthwash (ENTER INGREDIENTS IN COMMENTS) suspension; Swish and spit 5-10 mLs in mouth every 6 hours as needed 30 ml of Benadryl (12.5 mg/5 ml), 60 ml Maalox, 30 ml Viscous Lidocaine    Chronic sore throat    Referral to ENT as pt has had a sore throat for over 4 weeks  - Adult ENT  Referral; Future    Benign essential hypertension    Patient advised to follow up with PCP for recheck blood pressure   Information given to patient on diet modifications, including lowering salt in diet, decrease calories, weight loss and exercise.  Monitor blood pressure at home and if BP maintains over 140/90 then advise having a recheck with PCP in 2 weeks.       Long term current use of anticoagulant therapy    Monitor INR as this can change with medications and illness      Review of external notes as documented elsewhere in note       BMI:   Estimated body mass index is 28.34 kg/m  as calculated from the following:    Height as of 1/17/23: 1.6 m (5' 3\").    Weight as of this encounter: 72.6 kg (160 lb).       CONSULTATION/REFERRAL to ENT    No follow-ups on file.    Chip Fitzgerald, [facility], PA-C  [facility] KENNEDI Ibarra is a 43 year old, " presenting for the following health issues:  Pharyngitis (First week of April had a severe sore throat- could not eat for three days. Did a bunch of tests- could not figure out what she had. Antibiotics helped for awhile, but not all the way. Still has sore throat, has fatigue and couldn't work today. Was wondering if she has mono, because her symptoms were similar to when she had it when she was younger. No fever. )         View : No data to display.              HPI   Review of Systems   Constitutional, HEENT, cardiovascular, pulmonary, gi and gu systems are negative, except as otherwise noted.      Objective    BP (!) 145/86   Pulse 77   Temp 98  F (36.7  C) (Oral)   Wt 72.6 kg (160 lb)   SpO2 99%   BMI 28.34 kg/m    Body mass index is 28.34 kg/m .  Physical Exam   GENERAL: healthy, alert and no distress  EYES: Eyes grossly normal to inspection, PERRL and conjunctivae and sclerae normal  HENT: ear canals and TM's normal, nose and mouth without ulcers or lesions  NECK: no adenopathy, no asymmetry, masses, or scars and thyroid normal to palpation  RESP: lungs clear to auscultation - no rales, rhonchi or wheezes  CV: regular rate and rhythm, normal S1 S2, no S3 or S4, no murmur, click or rub, no peripheral edema and peripheral pulses strong  MS: no gross musculoskeletal defects noted, no edema  SKIN: no suspicious lesions or rashes  NEURO: Normal strength and tone, mentation intact and speech normal  PSYCH: mentation appears normal, affect normal/bright      Results for orders placed or performed in visit on 04/24/23   Mononucleosis screen     Status: Normal   Result Value Ref Range    Mononucleosis Screen Negative Negative   CBC with platelets and differential     Status: None   Result Value Ref Range    WBC Count 5.6 4.0 - 11.0 10e3/uL    RBC Count 4.44 3.80 - 5.20 10e6/uL    Hemoglobin 13.7 11.7 - 15.7 g/dL    Hematocrit 41.4 35.0 - 47.0 %    MCV 93 78 - 100 fL    MCH 30.9 26.5 - 33.0 pg    MCHC 33.1 31.5 -  36.5 g/dL    RDW 14.1 10.0 - 15.0 %    Platelet Count 224 150 - 450 10e3/uL    % Neutrophils 58 %    % Lymphocytes 32 %    % Monocytes 8 %    % Eosinophils 2 %    % Basophils 0 %    Absolute Neutrophils 3.3 1.6 - 8.3 10e3/uL    Absolute Lymphocytes 1.8 0.8 - 5.3 10e3/uL    Absolute Monocytes 0.4 0.0 - 1.3 10e3/uL    Absolute Eosinophils 0.1 0.0 - 0.7 10e3/uL    Absolute Basophils 0.0 0.0 - 0.2 10e3/uL   Streptococcus A Rapid Screen w/Reflex to PCR - Clinic Collect     Status: Normal    Specimen: Throat; Swab   Result Value Ref Range    Group A Strep antigen Negative Negative   CBC with platelets and differential     Status: None    Narrative    The following orders were created for panel order CBC with platelets and differential.  Procedure                               Abnormality         Status                     ---------                               -----------         ------                     CBC with platelets and d...[[id number]]                      Final result                 Please view results for these tests on the individual orders.

## 2023-04-25 LAB — GROUP A STREP BY PCR: NOT DETECTED

## 2023-05-16 DIAGNOSIS — I10 BENIGN ESSENTIAL HYPERTENSION: ICD-10-CM

## 2023-05-16 NOTE — TELEPHONE ENCOUNTER
Provider approval needed.     BP Readings from Last 3 Encounters:   04/24/23 (!) 145/86   01/17/23 117/81   02/23/22 127/81

## 2023-05-18 RX ORDER — LOSARTAN POTASSIUM 25 MG/1
TABLET ORAL
Qty: 90 TABLET | Refills: 1 | Status: SHIPPED | OUTPATIENT
Start: 2023-05-18 | End: 2023-09-07

## 2023-06-02 ENCOUNTER — LAB (OUTPATIENT)
Dept: LAB | Facility: CLINIC | Age: 43
End: 2023-06-02
Payer: COMMERCIAL

## 2023-06-02 ENCOUNTER — ANTICOAGULATION THERAPY VISIT (OUTPATIENT)
Dept: ANTICOAGULATION | Facility: CLINIC | Age: 43
End: 2023-06-02

## 2023-06-02 DIAGNOSIS — Z79.01 CURRENT USE OF LONG TERM ANTICOAGULATION: ICD-10-CM

## 2023-06-02 DIAGNOSIS — Z79.01 LONG TERM CURRENT USE OF ANTICOAGULANT THERAPY: Primary | ICD-10-CM

## 2023-06-02 DIAGNOSIS — D68.52 PROTHROMBIN GENE MUTATION (H): ICD-10-CM

## 2023-06-02 LAB — INR PPP: 2.53 (ref 0.85–1.15)

## 2023-06-02 PROCEDURE — 85610 PROTHROMBIN TIME: CPT

## 2023-06-02 PROCEDURE — 36415 COLL VENOUS BLD VENIPUNCTURE: CPT

## 2023-06-02 NOTE — PROGRESS NOTES
ANTICOAGULATION MANAGEMENT     Stacey Maki 43 year old female is on warfarin with therapeutic INR result. (Goal INR 2.0-3.0)    Recent labs: (last 7 days)     06/02/23  1512   INR 2.53*       ASSESSMENT       Source(s): Chart Review       Warfarin doses taken: Reviewed in chart    Diet: No new diet changes identified    Medication/supplement changes: None noted    New illness, injury, or hospitalization: No    Signs or symptoms of bleeding or clotting: No    Previous result: Therapeutic last 2(+) visits    Additional findings: None         PLAN     Recommended plan for no diet, medication or health factor changes affecting INR     Dosing Instructions: Continue your current warfarin dose with next INR in 4 weeks       Summary  As of 6/2/2023    Full warfarin instructions:  6 mg every Sun, Wed; 4 mg all other days   Next INR check:  6/30/2023             Detailed voice message left for Stacey with dosing instructions and follow up date.     Contact [phone] to schedule and with any changes, questions or concerns.     Education provided:     Please call back if any changes to your diet, medications or how you've been taking warfarin    Contact [phone] with any changes, questions or concerns.     Plan made per [facility] anticoagulation protocol    Merced Cadet, RN  Anticoagulation Clinic  6/2/2023    _______________________________________________________________________     Anticoagulation Episode Summary     Current INR goal:  2.0-3.0   TTR:  83.3 % (1 y)   Target end date:  Indefinite   Send INR reminders to:   [facility]    Indications    Long term current use of anticoagulant therapy [Z79.01]  Prothrombin gene mutation (H) [D68.52]           Comments:           Anticoagulation Care Providers     Provider Role Specialty Phone number    Danny Clark MD Referring Hematology [phone]    Norman Albert PA-C Referring Hematology [phone]

## 2023-06-09 ENCOUNTER — DOCUMENTATION ONLY (OUTPATIENT)
Dept: ANTICOAGULATION | Facility: CLINIC | Age: 43
End: 2023-06-09

## 2023-06-09 DIAGNOSIS — D68.52 PROTHROMBIN GENE MUTATION (H): ICD-10-CM

## 2023-06-09 DIAGNOSIS — Z79.01 LONG TERM CURRENT USE OF ANTICOAGULANT THERAPY: Primary | ICD-10-CM

## 2023-06-09 NOTE — PROGRESS NOTES
ANTICOAGULATION CLINIC REFERRAL RENEWAL REQUEST       An annual renewal order is required for all patients referred to [facility] Anticoagulation Clinic.?  Please review and sign the pended referral order for Stacey Maki.       ANTICOAGULATION SUMMARY      Warfarin indication(s)   Prothrombin Gene Mutation    Mechanical heart valve present?  NO       Current goal range   INR: 2.0-3.0     Goal appropriate for indication? Goal INR 2-3, standard for indication(s) above     Time in Therapeutic Range (TTR)  (Goal > 60%) 83.3%       Office visit with referring provider's group within last year yes on 2/2/23       Merced Cadet RN  [facility] Anticoagulation Clinic

## 2023-07-12 ENCOUNTER — MYC MEDICAL ADVICE (OUTPATIENT)
Dept: HEMATOLOGY | Facility: CLINIC | Age: 43
End: 2023-07-12

## 2023-07-20 ENCOUNTER — ANTICOAGULATION THERAPY VISIT (OUTPATIENT)
Dept: ANTICOAGULATION | Facility: CLINIC | Age: 43
End: 2023-07-20

## 2023-07-20 ENCOUNTER — LAB (OUTPATIENT)
Dept: LAB | Facility: CLINIC | Age: 43
End: 2023-07-20
Payer: COMMERCIAL

## 2023-07-20 DIAGNOSIS — Z79.01 LONG TERM CURRENT USE OF ANTICOAGULANT THERAPY: Primary | ICD-10-CM

## 2023-07-20 DIAGNOSIS — D68.52 PROTHROMBIN GENE MUTATION (H): ICD-10-CM

## 2023-07-20 DIAGNOSIS — Z79.01 LONG TERM CURRENT USE OF ANTICOAGULANT THERAPY: ICD-10-CM

## 2023-07-20 LAB — INR PPP: 2.43 (ref 0.85–1.15)

## 2023-07-20 PROCEDURE — 36415 COLL VENOUS BLD VENIPUNCTURE: CPT

## 2023-07-20 PROCEDURE — 85610 PROTHROMBIN TIME: CPT

## 2023-07-20 NOTE — PROGRESS NOTES
ANTICOAGULATION MANAGEMENT     Stacey Maki 43 year old female is on warfarin with therapeutic INR result. (Goal INR 2.0-3.0)    Recent labs: (last 7 days)     07/20/23  1430   INR 2.43*       ASSESSMENT       Source(s): Chart Review       Warfarin doses taken: Reviewed in chart    Diet: No new diet changes identified    Medication/supplement changes: None noted    New illness, injury, or hospitalization: No    Signs or symptoms of bleeding or clotting: No    Previous result: Therapeutic last 2(+) visits    Additional findings: None         PLAN     Recommended plan for no diet, medication or health factor changes affecting INR     Dosing Instructions: Continue your current warfarin dose with next INR in 4 weeks       Summary  As of 7/20/2023    Full warfarin instructions:  6 mg every Sun, Wed; 4 mg all other days   Next INR check:  8/17/2023             Detailed voice message left for Stacey with dosing instructions and follow up date.     Contact [phone] to schedule and with any changes, questions or concerns.     Education provided:     Please call back if any changes to your diet, medications or how you've been taking warfarin    Contact [phone] with any changes, questions or concerns.     Plan made per [facility] anticoagulation protocol    Merced Cadet, RN  Anticoagulation Clinic  7/20/2023    _______________________________________________________________________     Anticoagulation Episode Summary     Current INR goal:  2.0-3.0   TTR:  83.3 % (1 y)   Target end date:  Indefinite   Send INR reminders to:   [facility]    Indications    Long term current use of anticoagulant therapy [Z79.01]  Prothrombin gene mutation (H) [D68.52]           Comments:           Anticoagulation Care Providers     Provider Role Specialty Phone number    Danny Clark MD Referring Hematology [phone]    Norman Albert PA-C Referring Hematology [phone]

## 2023-07-31 ENCOUNTER — OFFICE VISIT (OUTPATIENT)
Dept: FAMILY MEDICINE | Facility: CLINIC | Age: 43
End: 2023-07-31
Payer: COMMERCIAL

## 2023-07-31 DIAGNOSIS — D22.9 MULTIPLE BENIGN NEVI: Primary | ICD-10-CM

## 2023-07-31 DIAGNOSIS — L65.8 FEMALE PATTERN HAIR LOSS: ICD-10-CM

## 2023-07-31 DIAGNOSIS — L91.8 SKIN TAG: ICD-10-CM

## 2023-07-31 DIAGNOSIS — L82.1 SEBORRHEIC KERATOSES: ICD-10-CM

## 2023-07-31 DIAGNOSIS — D48.5 NEOPLASM OF UNCERTAIN BEHAVIOR OF SKIN: ICD-10-CM

## 2023-07-31 DIAGNOSIS — D23.9 DERMATOFIBROMA: ICD-10-CM

## 2023-07-31 DIAGNOSIS — L81.4 LENTIGINES: ICD-10-CM

## 2023-07-31 DIAGNOSIS — L57.8 PHOTOAGING OF SKIN: ICD-10-CM

## 2023-07-31 DIAGNOSIS — D18.01 CHERRY ANGIOMA: ICD-10-CM

## 2023-07-31 PROCEDURE — 88305 TISSUE EXAM BY PATHOLOGIST: CPT | Performed by: DERMATOLOGY

## 2023-07-31 PROCEDURE — 11102 TANGNTL BX SKIN SINGLE LES: CPT | Performed by: PHYSICIAN ASSISTANT

## 2023-07-31 PROCEDURE — 11200 RMVL SKIN TAGS UP TO&INC 15: CPT | Mod: XS | Performed by: PHYSICIAN ASSISTANT

## 2023-07-31 PROCEDURE — 99214 OFFICE O/P EST MOD 30 MIN: CPT | Mod: 25 | Performed by: PHYSICIAN ASSISTANT

## 2023-07-31 NOTE — LETTER
7/31/2023         RE: Stacey Maki  [address] [place] [address]        Dear Colleague,    Thank you for referring your patient, Stacey Maki, to the [facility]. Please see a copy of my visit note below.    [facility] Dermatology Note  Encounter Date: Jul 31, 2023  Office Visit      Dermatology Problem List:  0. NUB - R breast - s/p bx 7/31/23  1. Prothrombin gene mutation  2. Folliculitis - chest  - clindamycin 1% lotion  -previous tx: BPO was  3. Hair loss - androgenetic - 5% minoxidil topically    FBSE 7/31/23  ____________________________________________    Assessment & Plan:  # Benign findings: multiple benign nevi, lentigines, cherry angiomas, SKs, DNs  - edu on benign etiology  - Signs and Symptoms of non-melanoma skin cancer and ABCDEs of melanoma reviewed with patient. Patient encouraged to perform monthly self skin exams and educated on how to perform them. UV precautions reviewed with patient. Patient was asked about new or changing moles/lesions on body.   - Sunscreen: Apply 20 minutes prior to going outdoors and reapply every two hours, when wet or sweating. We recommend using an SPF 30 or higher, and to use one that is water resistant.     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 We recommend using an SPF 30 or higher, and to use one that is water resistant.     - discussed fraxel vs PDL options for chest - patient to consider  - brief discussion about botox    # Neoplasm of unspecified behavior of the skin (D49.2) on the R breast. The differential diagnosis includes DN vs other.   - Shave biopsy performed today, see  procedure note below.     Procedures Performed:   - Shave biopsy procedure note, location(s): R breast. After discussion of benefits and risks including but not limited to bleeding, infection, scar, incomplete removal, recurrence, and non-diagnostic biopsy, written consent and photographs were obtained. The area was cleaned with isopropyl alcohol. 0.5mL of 1% lidocaine with epinephrine was injected to obtain adequate anesthesia of lesion(s). Shave biopsy at site(s) performed. Hemostasis was achieved with aluminium chloride. Petrolatum ointment and a sterile dressing were applied. The patient tolerated the procedure and no complications were noted. The patient was provided with verbal and written post care instructions.      Follow-up: 1 year(s) in-person, or earlier for new or changing lesions    Staff:     All risks, benefits and alternatives were discussed with patient.  Patient is in agreement and understands the assessment and plan.  All questions were answered.    Nayana Esquivel PA-C, [facility]  [facility] [place]: Phone: [phone], Fax: [phone]  [facility]: Phone: [phone],  Fax: [phone]  [facility]: Phone: [phone], Fax: [phone]  ____________________________________________    CC: Skin Check (1. FBSC/2.  Skin tag/3.  Questions Re tretinoin/4.  Thinning hair )    HPI:  Ms. Stacey Maki is a 43 year old female who presents today as a new patient for FBSE. Last seen by Dr. Rangel in 2021. No hx skin cancer. Has several concerns today. Notes a skin tag as well as questions about tretinoin. Also thinning hair.     Patient is otherwise feeling well, without additional concerns.    Labs:  none    Physical Exam:  Vitals: There were no vitals taken for this visit.  SKIN: Full skin, which includes the head/face, both arms, chest, back, abdomen,both legs, genitalia and/or groin buttocks,  digits and/or nails, was examined.   - Harris's skin type II, <100 nevi  - There are dome shaped bright red papules on the trunk.   - Multiple regular brown pigmented macules and papules are identified on the trunk and extremities.   - Scattered brown macules on sun exposed areas.  - There are waxy stuck on tan to brown papules on the trunk.   - R breast - 3mm dark brown macule  - widened part  - 5 pedunculated papules on the circumferential neck  - No other lesions of concern on areas examined.     Medications:  Current Outpatient Medications   Medication     acetaminophen (TYLENOL) 500 MG tablet     escitalopram (LEXAPRO) 20 MG tablet     losartan (COZAAR) 25 MG tablet     norethindrone (MICRONOR) 0.35 MG tablet     warfarin ANTICOAGULANT (COUMADIN) 2 MG tablet     magic mouthwash (ENTER INGREDIENTS IN COMMENTS) suspension     No current facility-administered medications for this visit.      Past Medical/Surgical History:   Patient Active Problem List   Diagnosis     Prothrombin gene mutation (H)     Long term current use of anticoagulant therapy     History of cerebral venous sinus thrombosis     Benign essential hypertension     Premenstrual dysphoric disorder     Recurrent major depressive disorder, in full remission (H)     NIKA (generalized anxiety disorder)     CARDIOVASCULAR SCREENING; LDL GOAL LESS THAN 130     Past Medical History:   Diagnosis Date     Arteriovenous malformation      Asthma      Chronic cerebral venous sinus thrombosis     diagnosed in 2007, 2nd 2009 5wk preg.      Heart disease     mother     Hypertension 2016    mother     Idiopathic intracranial hypertension      Papilledema      Prothrombin gene mutation (H)     heterozygous type     Tension headache                         Again, thank you for allowing me to participate in the care of your patient.        Sincerely,        Nayana Esquivel PA-C

## 2023-07-31 NOTE — PATIENT INSTRUCTIONS
Patient Education       Proper skin care from [facility]:    -Eliminate harsh soaps as they strip the natural oils from the skin, often resulting in dry itchy skin ( i.e. Dial, Zest, [place] Spring)  -Use mild soaps such as Cetaphil or Dove Sensitive Skin in the shower. You do not need to use soap on arms, legs, and trunk every time you shower unless visibly soiled.   -Avoid hot or cold showers.  -After showering, lightly dry off and apply moisturizing within 2-3 minutes. This will help trap moisture in the skin.   -Aggressive use of a moisturizer at least 1-2 times a day to the entire body (including -Vanicream, Cetaphil, Aquaphor or Cerave) and moisturize hands after every washing.  -We recommend using moisturizers that come in a tub that needs to be scooped out, not a pump. This has more of an oil base. It will hold moisture in your skin much better than a water base moisturizer. The above recommended are non-pore clogging.      Wear a sunscreen with at least SPF 30 on your face, ears, neck and V of the chest daily. Wear sunscreen on other areas of the body if those areas are exposed to the sun throughout the day. Sunscreens can contain physical and/or chemical blockers. Physical blockers are less likely to clog pores, these include zinc oxide and titanium dioxide. Reapply every two hour and after swimming.     Sunscreen examples: https://www.ewg.org/sunscreen/    UV radiation  UVA radiation remains constant throughout the day and throughout the year. It is a longer wavelength than UVB and therefore penetrates deeper into the skin leading to immediate and delayed tanning, photoaging, and skin cancer. 70-80% of UVA and UVB radiation occurs between the hours of 10am-2pm.  UVB radiation  UVB radiation causes the most harmful effects and is more significant during the summer months. However, snow and ice can reflect UVB radiation leading to skin damage during the winter months as well. UVB radiation is  responsible for tanning, burning, inflammation, delayed erythema (pinkness), pigmentation (brown spots), and skin cancer.     I recommend self monthly full body exams and yearly full body exams with a dermatology provider. If you develop a new or changing lesion please follow up for examination. Most skin cancers are pink and scaly or pink and pearly. However, we do see blue/brown/black skin cancers.  Consider the ABCDEs of melanoma when giving yourself your monthly full body exam ( don't forget the groin, buttocks, feet, toes, etc). A-asymmetry, B-borders, C-color, D-diameter, E-elevation or evolving. If you see any of these changes please follow up in clinic. If you cannot see your back I recommend purchasing a hand held mirror to use with a larger wall mirror.       Checking for Skin Cancer  You can find cancer early by checking your skin each month. There are 3 kinds of skin cancer. They are melanoma, basal cell carcinoma, and squamous cell carcinoma. Doing monthly skin checks is the best way to find new marks or skin changes. Follow the instructions below for checking your skin.   The ABCDEs of checking moles for melanoma   Check your moles or growths for signs of melanoma using ABCDE:   Asymmetry: the sides of the mole or growth don t match  Border: the edges are ragged, notched, or blurred  Color: the color within the mole or growth varies  Diameter: the mole or growth is larger than 6 mm (size of a pencil eraser)  Evolving: the size, shape, or color of the mole or growth is changing (evolving is not shown in the images below)    Checking for other types of skin cancer  Basal cell carcinoma or squamous cell carcinoma have symptoms such as:     A spot or mole that looks different from all other marks on your skin  Changes in how an area feels, such as itching, tenderness, or pain  Changes in the skin's surface, such as oozing, bleeding, or scaliness  A sore that does not heal  New swelling or redness beyond  the border of a mole    Who s at risk?  Anyone can get skin cancer. But you are at greater risk if you have:   Fair skin, light-colored hair, or light-colored eyes  Many moles or abnormal moles on your skin  A history of sunburns from sunlight or tanning beds  A family history of skin cancer  A history of exposure to radiation or chemicals  A weakened immune system  If you have had skin cancer in the past, you are at risk for recurring skin cancer.   How to check your skin  Do your monthly skin checkups in front of a full-length mirror. Check all parts of your body, including your:   Head (ears, face, neck, and scalp)  Torso (front, back, and sides)  Arms (tops, undersides, upper, and lower armpits)  Hands (palms, backs, and fingers, including under the nails)  Buttocks and genitals  Legs (front, back, and sides)  Feet (tops, soles, toes, including under the nails, and between toes)  If you have a lot of moles, take digital photos of them each month. Make sure to take photos both up close and from a distance. These can help you see if any moles change over time.   Most skin changes are not cancer. But if you see any changes in your skin, call your doctor right away. Only he or she can diagnose a problem. If you have skin cancer, seeing your doctor can be the first step toward getting the treatment that could save your life.   [employer] last reviewed this educational content on 4/1/2019 2000-2020 [employer]. [address] [place] [address], [place] [address]. All rights reserved. This information is not intended as a substitute for professional medical care. Always follow your healthcare professional's instructions.       When should I call my doctor?  If you are worsening or not improving, please, contact us or seek urgent care as noted below.     Who should I call with questions (adults)?  [facility] (adult and pediatric): [phone]  [facility]  [place] (adult): [phone]  [facility] ([place], [place], [place] and [place]) [phone]  For urgent needs outside of business hours call the [facility] at [phone] and ask for the dermatology resident on call to be paged  If this is a medical emergency and you are unable to reach an ER, Call 911      If you need a prescription refill, please contact your pharmacy. Refills are approved or denied by our Physicians during normal business hours, Monday through Fridays  Per office policy, refills will not be granted if you have not been seen within the past year (or sooner depending on your child's condition) Wound Care After a Biopsy    What is a skin biopsy?  A skin biopsy allows the doctor to examine a very small piece of tissue under the microscope to determine the diagnosis and the best treatment for the skin condition. A local anesthetic (numbing medicine) is injected with a very small needle into the skin area to be tested. A small piece of skin is taken from the area. Sometimes a suture (stitch) is used.     What are the risks of a skin biopsy?  I will experience scar, bleeding, swelling, pain, crusting and redness. I may experience incomplete removal or recurrence. Risks of this procedure are excessive bleeding, bruising, infection, nerve damage, numbness, thick (hypertrophic or keloidal) scar and non-diagnostic biopsy.    How should I care for my wound for the first 24 hours?  Keep the wound dry and covered for 24 hours  If it bleeds, hold direct pressure on the area for 15 minutes. If bleeding does not stop, call us or go to the emergency room  Avoid strenuous exercise the first 1-2 days or as your doctor instructs you    How should I care for the wound after 24 hours?  After 24 hours, remove the bandage  You may bathe or shower as normal  If you had a scalp biopsy, you can shampoo as usual and can use shower water to clean the biopsy site daily  Clean the  wound once a day with gentle soap and water  Do not scrub, be gentle  Apply white petroleum/Vaseline after cleaning the wound with a cotton swab or a clean finger, and keep the site covered with a Bandaid /bandage. Bandages are not necessary with a scalp biopsy  If you are unable to cover the site with a Bandaid /bandage, re-apply ointment 2-3 times a day to keep the site moist. Moisture will help with healing  Avoid strenuous activity for first 1-2 days  Avoid lakes, rivers, pools, and oceans until the stitches are removed or the site is healed    How do I clean my wound?  Wash hands thoroughly with soap or use hand  before all wound care  Clean the wound with gentle soap and water  Apply white petroleum/Vaseline  to wound after it is clean  Replace the Bandaid /bandage to keep the wound covered for the first few days or as instructed by your doctor  If you had a scalp biopsy, warm shower water to the area on a daily basis should suffice    What should I use to clean my wound?   Cotton-tipped applicators (Qtips )  White petroleum jelly (Vaseline ). Use a clean new container and use Q-tips to apply.  Bandaids  as needed  Gentle soap     How should I care for my wound long term?  Do not get your wound dirty  Keep up with wound care for one week or until the area is healed.  A small scab will form and fall off by itself when the area is completely healed. The area will be red and will become pink in color as it heals. Sun protection is very important for how your scar will turn out. Sunscreen with an SPF 30 or greater is recommended once the area is healed.  You should have some soreness but it should be mild and slowly go away over several days. Talk to your doctor about using tylenol for pain,    When should I call my doctor?  If you have increased:   Pain or swelling  Pus or drainage (clear or slightly yellow drainage is ok)  Temperature over 100F  Spreading redness or warmth around wound    When will I  hear about my results?  The biopsy results can take 2 weeks to come back.  Your results will automatically release to [employer] before your provider has even reviewed them.  The clinic will call you with the results, send you a [employer] message, or have you schedule a follow-up clinic or phone time to discuss the results.  Contact our clinics if you do not hear from us in 2 weeks.    Who should I call with questions?  [facility]: [phone]  [facility]: [phone]  For urgent needs outside of business hours call the [facility] at [phone] and ask for the dermatology resident on call

## 2023-07-31 NOTE — PROGRESS NOTES
[facility] Dermatology Note  Encounter Date: Jul 31, 2023  Office Visit      Dermatology Problem List:  0. NUB - R breast - s/p bx 7/31/23  1. Prothrombin gene mutation  2. Folliculitis - chest  - clindamycin 1% lotion  -previous tx: BPO was  3. Hair loss - androgenetic - 5% minoxidil topically  4. Skin Tags - neck - cryo    FBSE 7/31/23  ____________________________________________    Assessment & Plan:  # Benign findings: multiple benign nevi, lentigines, cherry angiomas, SKs, DNs  - edu on benign etiology  - Signs and Symptoms of non-melanoma skin cancer and ABCDEs of melanoma reviewed with patient. Patient encouraged to perform monthly self skin exams and educated on how to perform them. UV precautions reviewed with patient. Patient was asked about new or changing moles/lesions on body.   - Sunscreen: Apply 20 minutes prior to going outdoors and reapply every two hours, when wet or sweating. We recommend using an SPF 30 or higher, and to use one that is water resistant.     - RTC for changes    # Female patterned hair loss  - start topical Rogaine 5% OTC  - patient to follow up for separate appointment for more in depth assessment    # Skin Tags x5 - neck  - cryo - see below    # Photo aging  - adapalene 0.1% OTC recommended  - Sunscreen: Apply 20 minutes prior to going outdoors and reapply every two hours, when wet or sweating. We recommend using an SPF 30 or higher, and to use one that is water resistant.     - discussed fraxel vs PDL options for chest - patient to consider  - brief discussion about botox    # Neoplasm of unspecified behavior of the skin (D49.2) on the R breast. The differential diagnosis includes DN vs other.   - Shave biopsy performed today, see procedure note below.     Procedures Performed:   - Shave biopsy procedure note, location(s): R breast. After discussion of benefits and risks including but not limited to bleeding, infection, scar, incomplete removal, recurrence,  and non-diagnostic biopsy, written consent and photographs were obtained. The area was cleaned with isopropyl alcohol. 0.5mL of 1% lidocaine with epinephrine was injected to obtain adequate anesthesia of lesion(s). Shave biopsy at site(s) performed. Hemostasis was achieved with aluminium chloride. Petrolatum ointment and a sterile dressing were applied. The patient tolerated the procedure and no complications were noted. The patient was provided with verbal and written post care instructions.      Cryotherapy procedure note: After verbal consent and discussion of risks and benefits including but no limited to dyspigmentation/scar, blister, and pain, 5 was(were) treated with 1-2mm freeze border for 2 cycles with liquid nitrogen. Post cryotherapy instructions were provided.     Follow-up: 1 year(s) in-person, or earlier for new or changing lesions    Staff:     All risks, benefits and alternatives were discussed with patient.  Patient is in agreement and understands the assessment and plan.  All questions were answered.    Nayana Esquivel PA-C, MPAS  [facility]: Phone: [phone], Fax: [phone]  [facility]: Phone: [phone],  Fax: [phone]  [facility]: Phone: [phone], Fax: [phone]  ____________________________________________    CC: Skin Check (1. FBSC/2.  Skin tag/3.  Questions Re tretinoin/4.  Thinning hair )    HPI:  Ms. Stacey Maki is a 43 year old female who presents today as a new patient for FBSE. Last seen by Dr. Rangel in 2021. No hx skin cancer. Has several concerns today. Notes a skin tag as well as questions about tretinoin. Also thinning hair.     Patient is otherwise feeling well, without additional concerns.    Labs:  none    Physical Exam:  Vitals: There were no vitals taken for this visit.  SKIN: Full skin, which includes the head/face, both arms, chest, back,  abdomen,both legs, genitalia and/or groin buttocks, digits and/or nails, was examined.   - Harris's skin type II, <100 nevi  - There are dome shaped bright red papules on the trunk.   - Multiple regular brown pigmented macules and papules are identified on the trunk and extremities.   - Scattered brown macules on sun exposed areas.  - There are waxy stuck on tan to brown papules on the trunk.   - R breast - 3mm dark brown macule  - widened part  - 5 pedunculated papules on the circumferential neck  - No other lesions of concern on areas examined.     Medications:  Current Outpatient Medications   Medication    acetaminophen (TYLENOL) 500 MG tablet    escitalopram (LEXAPRO) 20 MG tablet    losartan (COZAAR) 25 MG tablet    norethindrone (MICRONOR) 0.35 MG tablet    warfarin ANTICOAGULANT (COUMADIN) 2 MG tablet    magic mouthwash (ENTER INGREDIENTS IN COMMENTS) suspension     No current facility-administered medications for this visit.      Past Medical/Surgical History:   Patient Active Problem List   Diagnosis    Prothrombin gene mutation (H)    Long term current use of anticoagulant therapy    History of cerebral venous sinus thrombosis    Benign essential hypertension    Premenstrual dysphoric disorder    Recurrent major depressive disorder, in full remission (H)    NIKA (generalized anxiety disorder)    CARDIOVASCULAR SCREENING; LDL GOAL LESS THAN 130     Past Medical History:   Diagnosis Date    Arteriovenous malformation     Asthma     Chronic cerebral venous sinus thrombosis     diagnosed in 2007, 2nd 2009 5wk preg.     Heart disease     mother    Hypertension 2016    mother    Idiopathic intracranial hypertension     Papilledema     Prothrombin gene mutation (H)     heterozygous type    Tension headache

## 2023-08-08 LAB
PATH REPORT.COMMENTS IMP SPEC: NORMAL
PATH REPORT.COMMENTS IMP SPEC: NORMAL
PATH REPORT.FINAL DX SPEC: NORMAL
PATH REPORT.GROSS SPEC: NORMAL
PATH REPORT.MICROSCOPIC SPEC OTHER STN: NORMAL
PATH REPORT.RELEVANT HX SPEC: NORMAL

## 2023-09-04 ASSESSMENT — ANXIETY QUESTIONNAIRES
7. FEELING AFRAID AS IF SOMETHING AWFUL MIGHT HAPPEN: NOT AT ALL
GAD7 TOTAL SCORE: 5
3. WORRYING TOO MUCH ABOUT DIFFERENT THINGS: SEVERAL DAYS
GAD7 TOTAL SCORE: 5
IF YOU CHECKED OFF ANY PROBLEMS ON THIS QUESTIONNAIRE, HOW DIFFICULT HAVE THESE PROBLEMS MADE IT FOR YOU TO DO YOUR WORK, TAKE CARE OF THINGS AT HOME, OR GET ALONG WITH OTHER PEOPLE: SOMEWHAT DIFFICULT
2. NOT BEING ABLE TO STOP OR CONTROL WORRYING: SEVERAL DAYS
1. FEELING NERVOUS, ANXIOUS, OR ON EDGE: SEVERAL DAYS
5. BEING SO RESTLESS THAT IT IS HARD TO SIT STILL: NOT AT ALL
4. TROUBLE RELAXING: NOT AT ALL
6. BECOMING EASILY ANNOYED OR IRRITABLE: MORE THAN HALF THE DAYS

## 2023-09-05 ENCOUNTER — TELEPHONE (OUTPATIENT)
Dept: ANTICOAGULATION | Facility: CLINIC | Age: 43
End: 2023-09-05

## 2023-09-05 ENCOUNTER — OFFICE VISIT (OUTPATIENT)
Dept: URGENT CARE | Facility: URGENT CARE | Age: 43
End: 2023-09-05
Payer: COMMERCIAL

## 2023-09-05 VITALS
DIASTOLIC BLOOD PRESSURE: 87 MMHG | HEART RATE: 78 BPM | TEMPERATURE: 98 F | SYSTOLIC BLOOD PRESSURE: 130 MMHG | OXYGEN SATURATION: 98 % | RESPIRATION RATE: 20 BRPM

## 2023-09-05 DIAGNOSIS — N89.8 VAGINAL ITCHING: ICD-10-CM

## 2023-09-05 DIAGNOSIS — N89.8 VAGINAL IRRITATION: ICD-10-CM

## 2023-09-05 DIAGNOSIS — B37.31 YEAST VAGINITIS: Primary | ICD-10-CM

## 2023-09-05 LAB
ALBUMIN UR-MCNC: NEGATIVE MG/DL
APPEARANCE UR: CLEAR
BACTERIA #/AREA URNS HPF: ABNORMAL /HPF
BILIRUB UR QL STRIP: NEGATIVE
CLUE CELLS: ABNORMAL
COLOR UR AUTO: YELLOW
GLUCOSE UR STRIP-MCNC: NEGATIVE MG/DL
HGB UR QL STRIP: ABNORMAL
KETONES UR STRIP-MCNC: NEGATIVE MG/DL
LEUKOCYTE ESTERASE UR QL STRIP: ABNORMAL
NITRATE UR QL: NEGATIVE
PH UR STRIP: 7 [PH] (ref 5–7)
RBC #/AREA URNS AUTO: ABNORMAL /HPF
SP GR UR STRIP: 1.02 (ref 1–1.03)
SQUAMOUS #/AREA URNS AUTO: ABNORMAL /LPF
TRICHOMONAS, WET PREP: ABNORMAL
UROBILINOGEN UR STRIP-ACNC: 0.2 E.U./DL
WBC #/AREA URNS AUTO: ABNORMAL /HPF
WBC'S/HIGH POWER FIELD, WET PREP: ABNORMAL
YEAST, WET PREP: PRESENT

## 2023-09-05 PROCEDURE — 87210 SMEAR WET MOUNT SALINE/INK: CPT | Performed by: PHYSICIAN ASSISTANT

## 2023-09-05 PROCEDURE — 99213 OFFICE O/P EST LOW 20 MIN: CPT | Performed by: PHYSICIAN ASSISTANT

## 2023-09-05 PROCEDURE — 81001 URINALYSIS AUTO W/SCOPE: CPT | Performed by: PHYSICIAN ASSISTANT

## 2023-09-05 RX ORDER — FLUCONAZOLE 150 MG/1
150 TABLET ORAL ONCE
Qty: 1 TABLET | Refills: 0 | Status: SHIPPED | OUTPATIENT
Start: 2023-09-05 | End: 2023-09-05

## 2023-09-05 NOTE — PROGRESS NOTES
ASSESSMENT/PLAN:    (B37.31) Yeast vaginitis  (primary encounter diagnosis)  Plan: fluconazole (DIFLUCAN) 150 MG tablet    -Diflucan  -Patient also advised Diflucan can alter INR when taking Coumadin.  I advise inform her INR team she has been taking Diflucan     (N89.8) Vaginal itching    Plan: Wet preparation, UA Macroscopic with reflex to         Microscopic and Culture, UA Microscopic with         Reflex to Culture      (N89.8) Vaginal irritation    Plan: Wet preparation, UA Macroscopic with reflex to         Microscopic and Culture, UA Microscopic with         Reflex to Culture      --------------------    Chief Complaint   Patient presents with    UTI     Poss uti pain and frequency           Stacey Maki is a 43 year old female who presents to urgent care clinic today for evaluation of a possible vaginal infection versus UTI.    HPI: Patient states she has had 10 days of waxing and waning vaginal itching (without the typical increased white vaginal discharge she has reportedly had with prior yeast infections).  She has had some intermittent vaginal irritation as well, but denies any acute dysuria or urinary urgency/frequency (despite the rooming chief complaint noted above).    GYN: Positive as per above.  No acute pelvic pain.  Patient is offered, but declines, STI screening and pregnancy screening.    Patient Active Problem List   Diagnosis    Prothrombin gene mutation (H)    Long term current use of anticoagulant therapy    History of cerebral venous sinus thrombosis    Benign essential hypertension    Premenstrual dysphoric disorder    Recurrent major depressive disorder, in full remission (H)    NIKA (generalized anxiety disorder)    CARDIOVASCULAR SCREENING; LDL GOAL LESS THAN 130     Current Outpatient Medications   Medication    acetaminophen (TYLENOL) 500 MG tablet    escitalopram (LEXAPRO) 20 MG tablet    losartan (COZAAR) 25 MG tablet    norethindrone (MICRONOR) 0.35 MG tablet    warfarin  ANTICOAGULANT (COUMADIN) 2 MG tablet     No current facility-administered medications for this visit.         Allergies   Allergen Reactions    Benzoyl Peroxide Swelling     Swollen eyelids    Seasonal Allergies Other (See Comments)     Runny nose, itchy eyes, breathing issues, and fatigue    Adhesive Tape Dermatitis          ROS:      CONSITUTIONAL: No sudden onset fever, chills or acute onset weakness  CARDIAC: No sudden onset chest pain or shortness of breath   RESP: No sudden onset cough, chest pain or shortness of breath   GI: No abdominal pain. No nausea, vomiting or diarrhea. No acute flank pain.   URINARY: Denies any dysuria, urinary urgency/frequency, hematuria, fever, chills, nausea, vomiting, back or flank pain.  NEURO: Denies any confusion or mental status changes  RHEUM: No sudden onset hot, red, warm joints       OBJECTIVE:  /87   Pulse 78   Temp 98  F (36.7  C) (Tympanic)   Resp 20   LMP 07/01/2023   SpO2 98%                GENERAL:  Very pleasant, comfortable and generally well appearing.  SKIN: No rashes.  Normal color.  Sclera clear.  CARDIAC:NORMAL - regular rate and rhythm without murmur., normal s1/s2 and without extra heart sounds  RESP: Normal - CTA without rales, rhonchi, or wheezing.  ABDOMEN:  Soft, non-tender, non-distended.  Positive normal bowel sounds.  No HSM or masses.  Positive, mild, suprapubic tenderness.  No CVA tenderness.  NEURO: Alert and oriented.  Normal speech and mentation.  CN II/XII grossly intact.  Gait within normal limits.      Results for orders placed or performed in visit on 09/05/23   UA Macroscopic with reflex to Microscopic and Culture     Status: Abnormal    Specimen: Urine, Clean Catch   Result Value Ref Range    Color Urine Yellow Colorless, Straw, Light Yellow, Yellow    Appearance Urine Clear Clear    Glucose Urine Negative Negative mg/dL    Bilirubin Urine Negative Negative    Ketones Urine Negative Negative mg/dL    Specific Gravity Urine 1.020  1.003 - 1.035    Blood Urine Trace (A) Negative    pH Urine 7.0 5.0 - 7.0    Protein Albumin Urine Negative Negative mg/dL    Urobilinogen Urine 0.2 0.2, 1.0 E.U./dL    Nitrite Urine Negative Negative    Leukocyte Esterase Urine Trace (A) Negative   UA Microscopic with Reflex to Culture     Status: Abnormal   Result Value Ref Range    Bacteria Urine Few (A) None Seen /HPF    RBC Urine 0-2 0-2 /HPF /HPF    WBC Urine 0-5 0-5 /HPF /HPF    Squamous Epithelials Urine Few (A) None Seen /LPF    Narrative    Urine Culture not indicated   Wet preparation     Status: Abnormal    Specimen: Vagina; Swab   Result Value Ref Range    Trichomonas Absent Absent    Yeast Present (A) Absent    Clue Cells Absent Absent    WBCs/high power field None None

## 2023-09-05 NOTE — TELEPHONE ENCOUNTER
ANTICOAGULATION  MANAGEMENT     Interacting Medication Review    Interacting medication(s): Fluconazole (Diflucan) with warfarin.    Duration: Single dose on today    Indication:  yeast vaginitis    New medication?: Yes, interaction may increase INR and risk of bleeding. Closer INR monitoring recommended.        PLAN     Continue your current warfarin dose with next INR in 2-3 days    . Advised that typically we may not even advise an INR for a one time dose of Diflucan if we had a recent INR but Stacey is overdue at this time so it would be best to check her INR as we do not have a recent baseline.        Telephone call with Stacey who verbalizes understanding and agrees to plan. States she will go as a walk in to the [facility] last later this week.     No adjustment to anticoagulation calendar was required    Plan made per [facility] anticoagulation protocol    Faith Turk RN  Anticoagulation Clinic

## 2023-09-07 ENCOUNTER — OFFICE VISIT (OUTPATIENT)
Dept: INTERNAL MEDICINE | Facility: CLINIC | Age: 43
End: 2023-09-07
Payer: COMMERCIAL

## 2023-09-07 VITALS
TEMPERATURE: 98.3 F | HEIGHT: 63 IN | OXYGEN SATURATION: 98 % | HEART RATE: 55 BPM | BODY MASS INDEX: 28.88 KG/M2 | SYSTOLIC BLOOD PRESSURE: 120 MMHG | RESPIRATION RATE: 16 BRPM | WEIGHT: 163 LBS | DIASTOLIC BLOOD PRESSURE: 80 MMHG

## 2023-09-07 DIAGNOSIS — Z79.01 LONG TERM CURRENT USE OF ANTICOAGULANT THERAPY: ICD-10-CM

## 2023-09-07 DIAGNOSIS — F41.1 GAD (GENERALIZED ANXIETY DISORDER): ICD-10-CM

## 2023-09-07 DIAGNOSIS — I10 BENIGN ESSENTIAL HYPERTENSION: ICD-10-CM

## 2023-09-07 DIAGNOSIS — F33.0 MILD EPISODE OF RECURRENT MAJOR DEPRESSIVE DISORDER (H): Primary | ICD-10-CM

## 2023-09-07 DIAGNOSIS — D68.52 PROTHROMBIN GENE MUTATION (H): ICD-10-CM

## 2023-09-07 LAB
ALBUMIN SERPL BCG-MCNC: 4.4 G/DL (ref 3.5–5.2)
ALP SERPL-CCNC: 25 U/L (ref 35–104)
ALT SERPL W P-5'-P-CCNC: 11 U/L (ref 0–50)
ANION GAP SERPL CALCULATED.3IONS-SCNC: 12 MMOL/L (ref 7–15)
AST SERPL W P-5'-P-CCNC: 28 U/L (ref 0–45)
BASOPHILS # BLD AUTO: 0 10E3/UL (ref 0–0.2)
BASOPHILS NFR BLD AUTO: 0 %
BILIRUB SERPL-MCNC: 0.4 MG/DL
BUN SERPL-MCNC: 8.4 MG/DL (ref 6–20)
CALCIUM SERPL-MCNC: 9.5 MG/DL (ref 8.6–10)
CHLORIDE SERPL-SCNC: 102 MMOL/L (ref 98–107)
CHOLEST SERPL-MCNC: 270 MG/DL
CREAT SERPL-MCNC: 0.79 MG/DL (ref 0.51–0.95)
DEPRECATED HCO3 PLAS-SCNC: 22 MMOL/L (ref 22–29)
EGFRCR SERPLBLD CKD-EPI 2021: >90 ML/MIN/1.73M2
EOSINOPHIL # BLD AUTO: 0.1 10E3/UL (ref 0–0.7)
EOSINOPHIL NFR BLD AUTO: 1 %
ERYTHROCYTE [DISTWIDTH] IN BLOOD BY AUTOMATED COUNT: 13.2 % (ref 10–15)
GLUCOSE SERPL-MCNC: 82 MG/DL (ref 70–99)
HCT VFR BLD AUTO: 43.9 % (ref 35–47)
HDLC SERPL-MCNC: 67 MG/DL
HGB BLD-MCNC: 14.9 G/DL (ref 11.7–15.7)
IMM GRANULOCYTES # BLD: 0 10E3/UL
IMM GRANULOCYTES NFR BLD: 0 %
INR PPP: 2.76 (ref 0.85–1.15)
LDLC SERPL CALC-MCNC: 188 MG/DL
LYMPHOCYTES # BLD AUTO: 1.8 10E3/UL (ref 0.8–5.3)
LYMPHOCYTES NFR BLD AUTO: 27 %
MCH RBC QN AUTO: 31.2 PG (ref 26.5–33)
MCHC RBC AUTO-ENTMCNC: 33.9 G/DL (ref 31.5–36.5)
MCV RBC AUTO: 92 FL (ref 78–100)
MONOCYTES # BLD AUTO: 0.5 10E3/UL (ref 0–1.3)
MONOCYTES NFR BLD AUTO: 7 %
NEUTROPHILS # BLD AUTO: 4.3 10E3/UL (ref 1.6–8.3)
NEUTROPHILS NFR BLD AUTO: 64 %
NONHDLC SERPL-MCNC: 203 MG/DL
PLATELET # BLD AUTO: 242 10E3/UL (ref 150–450)
POTASSIUM SERPL-SCNC: 4.5 MMOL/L (ref 3.4–5.3)
PROT SERPL-MCNC: 7.6 G/DL (ref 6.4–8.3)
RBC # BLD AUTO: 4.77 10E6/UL (ref 3.8–5.2)
SODIUM SERPL-SCNC: 136 MMOL/L (ref 136–145)
TRIGL SERPL-MCNC: 76 MG/DL
TSH SERPL DL<=0.005 MIU/L-ACNC: 1.35 UIU/ML (ref 0.3–4.2)
WBC # BLD AUTO: 6.6 10E3/UL (ref 4–11)

## 2023-09-07 PROCEDURE — 80053 COMPREHEN METABOLIC PANEL: CPT | Performed by: NURSE PRACTITIONER

## 2023-09-07 PROCEDURE — 84443 ASSAY THYROID STIM HORMONE: CPT | Performed by: NURSE PRACTITIONER

## 2023-09-07 PROCEDURE — 36415 COLL VENOUS BLD VENIPUNCTURE: CPT | Performed by: NURSE PRACTITIONER

## 2023-09-07 PROCEDURE — 99214 OFFICE O/P EST MOD 30 MIN: CPT | Performed by: NURSE PRACTITIONER

## 2023-09-07 PROCEDURE — 85610 PROTHROMBIN TIME: CPT | Performed by: NURSE PRACTITIONER

## 2023-09-07 PROCEDURE — 85025 COMPLETE CBC W/AUTO DIFF WBC: CPT | Performed by: NURSE PRACTITIONER

## 2023-09-07 PROCEDURE — 80061 LIPID PANEL: CPT | Performed by: NURSE PRACTITIONER

## 2023-09-07 RX ORDER — ESCITALOPRAM OXALATE 20 MG/1
20 TABLET ORAL DAILY
Qty: 90 TABLET | Refills: 3 | Status: SHIPPED | OUTPATIENT
Start: 2023-09-07 | End: 2023-11-28

## 2023-09-07 RX ORDER — LOSARTAN POTASSIUM 25 MG/1
25 TABLET ORAL DAILY
Qty: 90 TABLET | Refills: 3 | Status: SHIPPED | OUTPATIENT
Start: 2023-09-07 | End: 2024-08-09

## 2023-09-07 RX ORDER — BUSPIRONE HYDROCHLORIDE 10 MG/1
TABLET ORAL
Qty: 98 TABLET | Refills: 0 | Status: ON HOLD | OUTPATIENT
Start: 2023-09-07 | End: 2024-01-08

## 2023-09-07 RX ORDER — FLUCONAZOLE 150 MG/1
TABLET ORAL
COMMUNITY
Start: 2023-09-05 | End: 2023-11-28

## 2023-09-07 ASSESSMENT — PATIENT HEALTH QUESTIONNAIRE - PHQ9
10. IF YOU CHECKED OFF ANY PROBLEMS, HOW DIFFICULT HAVE THESE PROBLEMS MADE IT FOR YOU TO DO YOUR WORK, TAKE CARE OF THINGS AT HOME, OR GET ALONG WITH OTHER PEOPLE: NOT DIFFICULT AT ALL
SUM OF ALL RESPONSES TO PHQ QUESTIONS 1-9: 7
SUM OF ALL RESPONSES TO PHQ QUESTIONS 1-9: 7
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
## 2023-09-07 NOTE — PATIENT INSTRUCTIONS
[facility]  Renetta Black NP counselor     Buspar 10 mg twice daily for a week   Then 20 mg twice daily for 2-3 weeks   Let me know how you are doing in 3 weeks and we can increase to 30 mg twice daily of needed     Lab in [address]    Medication refills sent

## 2023-09-08 ENCOUNTER — ANTICOAGULATION THERAPY VISIT (OUTPATIENT)
Dept: ANTICOAGULATION | Facility: CLINIC | Age: 43
End: 2023-09-08

## 2023-09-08 DIAGNOSIS — Z79.01 LONG TERM CURRENT USE OF ANTICOAGULANT THERAPY: Primary | ICD-10-CM

## 2023-09-08 DIAGNOSIS — D68.52 PROTHROMBIN GENE MUTATION (H): ICD-10-CM

## 2023-09-08 NOTE — PROGRESS NOTES
ANTICOAGULATION MANAGEMENT     Stacey Maki 43 year old female is on warfarin with therapeutic INR result. (Goal INR 2.0-3.0)    Recent labs: (last 7 days)     09/07/23  1402   INR 2.76*       ASSESSMENT     Source(s): Chart Review     Warfarin doses taken: Reviewed in chart  Diet: No new diet changes identified  Medication/supplement changes:  per chart review one time dose of Fluconazole for yeast vaginitis  New illness, injury, or hospitalization: Yes: recent yeast vaginitis  Signs or symptoms of bleeding or clotting: No  Previous result: Therapeutic last 2(+) visits  Additional findings: None       PLAN     Recommended plan for no diet, medication or health factor changes affecting INR     Dosing Instructions: Continue your current warfarin dose with next INR in 4 weeks       Summary  As of 9/8/2023      Full warfarin instructions:  6 mg every Sun, Wed; 4 mg all other days   Next INR check:  10/6/2023               Detailed voice message left for Stacey with dosing instructions and follow up date.     Contact [phone] to schedule and with any changes, questions or concerns.     Education provided:   Please call back if any changes to your diet, medications or how you've been taking warfarin  Contact [phone] with any changes, questions or concerns.     Plan made per [facility] anticoagulation protocol    Praveena Blackwood RN  Anticoagulation Clinic  9/8/2023    _______________________________________________________________________     Anticoagulation Episode Summary       Current INR goal:  2.0-3.0   TTR:  95.4 % (1 y)   Target end date:  Indefinite   Send INR reminders to:  [facility]    Indications    Long term current use of anticoagulant therapy [Z79.01]  Prothrombin gene mutation (H) [D68.52]             Comments:               Anticoagulation Care Providers       Provider Role Specialty Phone number    Danny Clark MD Referring Hematology [phone]    Norman Albert PA-C Referring  Hematology [phone]

## 2023-10-01 ENCOUNTER — TELEPHONE (OUTPATIENT)
Dept: INTERNAL MEDICINE | Facility: CLINIC | Age: 43
End: 2023-10-01

## 2023-10-01 DIAGNOSIS — F41.1 GAD (GENERALIZED ANXIETY DISORDER): ICD-10-CM

## 2023-10-04 RX ORDER — BUSPIRONE HYDROCHLORIDE 10 MG/1
TABLET ORAL
Qty: 98 TABLET | Refills: 0 | OUTPATIENT
Start: 2023-10-04

## 2023-10-04 NOTE — TELEPHONE ENCOUNTER
Pt saw Jessica Golden CNP. Dr Parr out of office, and this was routed to her. Please change directions as appropriate and fax.

## 2023-10-04 NOTE — TELEPHONE ENCOUNTER
Patient calls back. She has not even started Buspirone and does not need refill right now.     She thought she was asking for refill for Lexapro-informed patient this does have refills at the pharmacy, but likely under a new prescription number and she should call them to request her refill. Buspirone refill request denied.     Janice Patton RN  [facility]

## 2023-10-17 ENCOUNTER — LAB (OUTPATIENT)
Dept: LAB | Facility: CLINIC | Age: 43
End: 2023-10-17
Payer: COMMERCIAL

## 2023-10-17 DIAGNOSIS — D68.52 PROTHROMBIN GENE MUTATION (H): ICD-10-CM

## 2023-10-17 DIAGNOSIS — Z79.01 LONG TERM CURRENT USE OF ANTICOAGULANT THERAPY: ICD-10-CM

## 2023-10-17 LAB — INR PPP: 2.89 (ref 0.85–1.15)

## 2023-10-17 PROCEDURE — 85610 PROTHROMBIN TIME: CPT

## 2023-10-17 PROCEDURE — 36415 COLL VENOUS BLD VENIPUNCTURE: CPT

## 2023-10-18 ENCOUNTER — ANTICOAGULATION THERAPY VISIT (OUTPATIENT)
Dept: ANTICOAGULATION | Facility: CLINIC | Age: 43
End: 2023-10-18

## 2023-10-18 DIAGNOSIS — Z79.01 LONG TERM CURRENT USE OF ANTICOAGULANT THERAPY: Primary | ICD-10-CM

## 2023-10-18 DIAGNOSIS — D68.52 PROTHROMBIN GENE MUTATION (H): ICD-10-CM

## 2023-10-18 NOTE — PROGRESS NOTES
ANTICOAGULATION MANAGEMENT     Stacey Maki 43 year old female is on warfarin with therapeutic INR result. (Goal INR 2.0-3.0)    Recent labs: (last 7 days)     10/17/23  1758   INR 2.89*       ASSESSMENT     Source(s): Chart Review  Previous INR was Therapeutic last 2(+) visits  Medication, diet, health changes since last INR chart reviewed; none identified         PLAN     Recommended plan for no diet, medication or health factor changes affecting INR     Dosing Instructions: Continue your current warfarin dose with next INR in 6 weeks       Summary  As of 10/18/2023      Full warfarin instructions:  6 mg every Sun, Wed; 4 mg all other days   Next INR check:  11/28/2023               Detailed voice message left for Stacey with dosing instructions and follow up date.     Contact [phone] to schedule and with any changes, questions or concerns.     Education provided:   Please call back if any changes to your diet, medications or how you've been taking warfarin    Plan made per [facility] anticoagulation protocol    Donte Vo RN  Anticoagulation Clinic  10/18/2023    _______________________________________________________________________     Anticoagulation Episode Summary       Current INR goal:  2.0-3.0   TTR:  100.0% (1 y)   Target end date:  Indefinite   Send INR reminders to:  [facility]    Indications    Long term current use of anticoagulant therapy [Z79.01]  Prothrombin gene mutation (H24) [D68.52]             Comments:               Anticoagulation Care Providers       Provider Role Specialty Phone number    Danny Clark MD Referring Hematology [phone]    Norman Albert PA-C Referring Hematology [phone]

## 2023-10-23 ENCOUNTER — MYC MEDICAL ADVICE (OUTPATIENT)
Dept: INTERNAL MEDICINE | Facility: CLINIC | Age: 43
End: 2023-10-23

## 2023-10-23 DIAGNOSIS — B37.31 YEAST INFECTION OF THE VAGINA: Primary | ICD-10-CM

## 2023-10-24 ENCOUNTER — E-VISIT (OUTPATIENT)
Dept: INTERNAL MEDICINE | Facility: CLINIC | Age: 43
End: 2023-10-24
Payer: COMMERCIAL

## 2023-10-24 DIAGNOSIS — N89.8 VAGINAL DISCHARGE: ICD-10-CM

## 2023-10-24 DIAGNOSIS — M54.50 LOW BACK PAIN, UNSPECIFIED BACK PAIN LATERALITY, UNSPECIFIED CHRONICITY, UNSPECIFIED WHETHER SCIATICA PRESENT: Primary | ICD-10-CM

## 2023-10-24 PROCEDURE — 99207 PR NON-BILLABLE SERV PER CHARTING: CPT | Performed by: NURSE PRACTITIONER

## 2023-10-24 NOTE — PATIENT INSTRUCTIONS
Dear Stacey Maki,    We are sorry you are not feeling well. Based on the responses you provided, it is recommended that you be seen in-person in urgent care so we can better evaluate your symptoms. Please click here to find the nearest urgent care location to you.   You will not be charged for this Visit. Thank you for trusting us with your care.    JORDAN Dominguez CNP

## 2023-10-24 NOTE — TELEPHONE ENCOUNTER
Please see patient's mychart message below. Advised her to do evisit.     Please advise on request for Diflucan refill  thanks.     Donte Swenson, Triage RN [place]  11:56 AM 10/24/2023

## 2023-10-24 NOTE — TELEPHONE ENCOUNTER
Provider E-Visit time total (minutes): 5  If low back pain new may need to do more testing for PID or STD

## 2023-10-24 NOTE — TELEPHONE ENCOUNTER
Sent [employer] message to patient.    Donte Swenson, Triage RN [place]  11:04 AM 10/24/2023

## 2023-10-25 ENCOUNTER — TELEPHONE (OUTPATIENT)
Dept: ANTICOAGULATION | Facility: CLINIC | Age: 43
End: 2023-10-25

## 2023-10-25 DIAGNOSIS — D68.52 PROTHROMBIN GENE MUTATION (H): ICD-10-CM

## 2023-10-25 DIAGNOSIS — Z79.01 LONG TERM CURRENT USE OF ANTICOAGULANT THERAPY: Primary | ICD-10-CM

## 2023-10-25 RX ORDER — FLUCONAZOLE 150 MG/1
150 TABLET ORAL
Qty: 3 TABLET | Refills: 0 | Status: SHIPPED | OUTPATIENT
Start: 2023-10-25 | End: 2023-11-28

## 2023-10-25 NOTE — TELEPHONE ENCOUNTER
10/25/23: Addendum:  Stacey called back to let the [facility] know she is not planning to take the diflucan prescription at this time. If she needs it in the future, she will call the [facility]--she understands that it can affect her INR and will need to be checked if she takes it.  Merced Cadet RN

## 2023-10-25 NOTE — TELEPHONE ENCOUNTER
ANTICOAGULATION  MANAGEMENT     Interacting Medication Review    Interacting medication(s): Fluconazole (Diflucan) with warfarin.    Duration: take one tablet by mouth every 3 days     Indication:  yeast infection     New medication?: Yes, interaction may increase INR and risk of bleeding. Closer INR monitoring recommended.     Pt has taken one dose of fluconazole back in Sept and her INR was good on day 3. However, if she needs to take more than one day, pt should have her INR rechecked in 5-6 days, per Phuong KERNS Rph.   Detailed VM left for pt with this info.    PLAN     Continue your current warfarin dose with next INR in 5-6 days    if you end up taking more than one dose.     Summary  As of 10/25/2023      Full warfarin instructions:  6 mg every Sun, Wed; 4 mg all other days   Next INR check:                 Left detailed voice message for Stacey with dosing instructions and follow up date.     No adjustment to anticoagulation calendar was required    Plan made with [facility] Pharmacist Phuong Avilez, RN  Anticoagulation Clinic

## 2023-11-02 DIAGNOSIS — D68.52 PROTHROMBIN GENE MUTATION (H): ICD-10-CM

## 2023-11-02 DIAGNOSIS — Z79.01 LONG TERM CURRENT USE OF ANTICOAGULANT THERAPY: ICD-10-CM

## 2023-11-02 RX ORDER — WARFARIN SODIUM 2 MG/1
TABLET ORAL
Qty: 252 TABLET | Refills: 3 | Status: SHIPPED | OUTPATIENT
Start: 2023-11-02

## 2023-11-02 NOTE — TELEPHONE ENCOUNTER
Patient last seen 2/2023, per patient communication notes they would like to remain on warfarin and not switch to DOAC.  Refill granted    Anastacia BEALN, RN, PHN   [facility]   Office: [phone]  Fax: [phone]

## 2023-11-19 ENCOUNTER — MYC MEDICAL ADVICE (OUTPATIENT)
Dept: INTERNAL MEDICINE | Facility: CLINIC | Age: 43
End: 2023-11-19

## 2023-11-28 ENCOUNTER — VIRTUAL VISIT (OUTPATIENT)
Dept: INTERNAL MEDICINE | Facility: CLINIC | Age: 43
End: 2023-11-28
Payer: COMMERCIAL

## 2023-11-28 DIAGNOSIS — F41.9 ANXIETY: ICD-10-CM

## 2023-11-28 DIAGNOSIS — Z71.3 WEIGHT LOSS COUNSELING, ENCOUNTER FOR: ICD-10-CM

## 2023-11-28 DIAGNOSIS — F33.1 MODERATE EPISODE OF RECURRENT MAJOR DEPRESSIVE DISORDER (H): Primary | ICD-10-CM

## 2023-11-28 DIAGNOSIS — I10 BENIGN ESSENTIAL HYPERTENSION: ICD-10-CM

## 2023-11-28 PROCEDURE — 99214 OFFICE O/P EST MOD 30 MIN: CPT | Mod: VID | Performed by: NURSE PRACTITIONER

## 2023-11-28 RX ORDER — BUPROPION HYDROCHLORIDE 150 MG/1
150 TABLET ORAL EVERY MORNING
Qty: 90 TABLET | Refills: 3 | Status: ON HOLD | OUTPATIENT
Start: 2023-11-28 | End: 2024-01-08

## 2023-11-28 RX ORDER — ESCITALOPRAM OXALATE 10 MG/1
10 TABLET ORAL DAILY
Qty: 90 TABLET | Refills: 3 | Status: SHIPPED | OUTPATIENT
Start: 2023-11-28 | End: 2024-01-24

## 2023-11-28 ASSESSMENT — ANXIETY QUESTIONNAIRES
IF YOU CHECKED OFF ANY PROBLEMS ON THIS QUESTIONNAIRE, HOW DIFFICULT HAVE THESE PROBLEMS MADE IT FOR YOU TO DO YOUR WORK, TAKE CARE OF THINGS AT HOME, OR GET ALONG WITH OTHER PEOPLE: NOT DIFFICULT AT ALL
2. NOT BEING ABLE TO STOP OR CONTROL WORRYING: NOT AT ALL
7. FEELING AFRAID AS IF SOMETHING AWFUL MIGHT HAPPEN: NOT AT ALL
5. BEING SO RESTLESS THAT IT IS HARD TO SIT STILL: NOT AT ALL
GAD7 TOTAL SCORE: 1
1. FEELING NERVOUS, ANXIOUS, OR ON EDGE: NOT AT ALL
6. BECOMING EASILY ANNOYED OR IRRITABLE: SEVERAL DAYS
4. TROUBLE RELAXING: NOT AT ALL
3. WORRYING TOO MUCH ABOUT DIFFERENT THINGS: NOT AT ALL
GAD7 TOTAL SCORE: 1

## 2023-11-28 ASSESSMENT — PATIENT HEALTH QUESTIONNAIRE - PHQ9
10. IF YOU CHECKED OFF ANY PROBLEMS, HOW DIFFICULT HAVE THESE PROBLEMS MADE IT FOR YOU TO DO YOUR WORK, TAKE CARE OF THINGS AT HOME, OR GET ALONG WITH OTHER PEOPLE: NOT DIFFICULT AT ALL
SUM OF ALL RESPONSES TO PHQ QUESTIONS 1-9: 4
SUM OF ALL RESPONSES TO PHQ QUESTIONS 1-9: 4

## 2023-11-28 NOTE — PROGRESS NOTES
"Stacey is a 43 year old who is being evaluated via a billable video visit.      How would you like to obtain your AVS? Delmar  If the video visit is dropped, the invitation should be resent by: delmar  Will anyone else be joining your video visit? No          Assessment & Plan     Moderate episode of recurrent major depressive disorder (H)  Want to decrease lexapro - will do trial of wellbutrin   - escitalopram (LEXAPRO) 10 MG tablet; Take 1 tablet (10 mg) by mouth daily  - buPROPion (WELLBUTRIN XL) 150 MG 24 hr tablet; Take 1 tablet (150 mg) by mouth every morning    Anxiety  Discussed   - escitalopram (LEXAPRO) 10 MG tablet; Take 1 tablet (10 mg) by mouth daily  - buPROPion (WELLBUTRIN XL) 150 MG 24 hr tablet; Take 1 tablet (150 mg) by mouth every morning    Weight loss counseling, encounter for  Discussed   Discussed turmeric for joint pain so she can exercise more   - buPROPion (WELLBUTRIN XL) 150 MG 24 hr tablet; Take 1 tablet (150 mg) by mouth every morning    Benign essential hypertension  In good range       30 minutes spent by me on the date of the encounter doing chart review, history and exam, documentation and further activities per the note       BMI:   Estimated body mass index is 29.34 kg/m  as calculated from the following:    Height as of 9/7/23: 1.588 m (5' 2.5\").    Weight as of 9/7/23: 73.9 kg (163 lb).       Patient Instructions   Lexapro 10 mg daily     Wellbutrin  mg daily     If needed we can increase dose on wellbutrin over time     Turmeric daily for joint pain     JORDAN Dominguez CNP  [facility]    Keshia Ibarra is a 43 year old, presenting for the following health issues:        Chief Complaint   Patient presents with    Weight Loss     Discuss weight loss meds and also discuss lexapro.     Gaining weight over time  Weight watcher and gym  More cravings     More craving with alcohol   Does not drink during week   But over drinks during the " weekend     History of Present Illness       Mental Health Follow-up:  Patient presents to follow-up on Depression & Anxiety.Patient's depression since last visit has been:  Good  The patient is not having other symptoms associated with depression.  Patient's anxiety since last visit has been:  Better  The patient is not having other symptoms associated with anxiety.  Any significant life events: No  Patient is not feeling anxious or having panic attacks.  Patient has concerns about alcohol or drug use.    Hyperlipidemia:  She presents for follow up of hyperlipidemia.   She is not taking medication to lower cholesterol. She is not having myalgia or other side effects to statin medications.    Hypertension: She presents for follow up of hypertension.  She does not check blood pressure  regularly outside of the clinic. Outpatient blood pressures have not been over 140/90. She follows a low salt diet.     She eats 2-3 servings of fruits and vegetables daily.She consumes 1 sweetened beverage(s) daily.She exercises with enough effort to increase her heart rate 10 to 19 minutes per day.  She exercises with enough effort to increase her heart rate 3 or less days per week.   She is taking medications regularly.       The 10-year ASCVD risk score (Dang DK, et al., 2019) is: 1.2%    Values used to calculate the score:      Age: 43 years      Sex: Female      Is Non- : No      Diabetic: No      Tobacco smoker: No      Systolic Blood Pressure: 120 mmHg      Is BP treated: Yes      HDL Cholesterol: 67 mg/dL      Total Cholesterol: 270 mg/dL    Want to decrease lexapro   Teenagers increase anxiety     PMMD - on birth control and seems to be helped   Was a deep depression 2 weeks before her cycle     Review of Systems   Constitutional, HEENT, cardiovascular, pulmonary, GI, , musculoskeletal, neuro, skin, endocrine and psych systems are negative, except as otherwise noted.      Objective            Vitals:  No vitals were obtained today due to virtual visit.    Physical Exam   GENERAL: alert and no distress  EYES: Eyes grossly normal to inspection.  No discharge or erythema, or obvious scleral/conjunctival abnormalities.  RESP: No audible wheeze, cough, or visible cyanosis.  No visible retractions or increased work of breathing.    SKIN: Visible skin clear. No significant rash, abnormal pigmentation or lesions.  NEURO: Cranial nerves grossly intact.  Mentation and speech appropriate for age.  PSYCH: Mentation appears normal, affect normal/bright, judgement and insight intact, normal speech and appearance well-groomed.                Video-Visit Details    Type of service:  Video Visit     Originating Location (pt. Location): Home    Distant Location (provider location):  On-site  Platform used for Video Visit: [employer]

## 2023-11-28 NOTE — NURSING NOTE
"Chief Complaint   Patient presents with    Weight Loss     Discuss weight loss meds and also discuss lexapro.     initial LMP  (LMP Unknown)  Estimated body mass index is 29.34 kg/m  as calculated from the following:    Height as of 9/7/23: 1.588 m (5' 2.5\").    Weight as of 9/7/23: 73.9 kg (163 lb)..  bp completed using cuff size NA (Not Taken)  KIESHA ASHER LPN  "

## 2023-11-28 NOTE — PATIENT INSTRUCTIONS
Lexapro 10 mg daily     Wellbutrin  mg daily     If needed we can increase dose on wellbutrin over time     Turmeric daily for joint pain

## 2023-12-04 ENCOUNTER — PATIENT OUTREACH (OUTPATIENT)
Dept: CARE COORDINATION | Facility: CLINIC | Age: 43
End: 2023-12-04

## 2023-12-06 ENCOUNTER — MYC MEDICAL ADVICE (OUTPATIENT)
Dept: INTERNAL MEDICINE | Facility: CLINIC | Age: 43
End: 2023-12-06

## 2023-12-06 ENCOUNTER — MYC MEDICAL ADVICE (OUTPATIENT)
Dept: ANTICOAGULATION | Facility: CLINIC | Age: 43
End: 2023-12-06

## 2023-12-07 ENCOUNTER — LAB (OUTPATIENT)
Dept: LAB | Facility: CLINIC | Age: 43
End: 2023-12-07
Payer: COMMERCIAL

## 2023-12-07 ENCOUNTER — ANTICOAGULATION THERAPY VISIT (OUTPATIENT)
Dept: ANTICOAGULATION | Facility: CLINIC | Age: 43
End: 2023-12-07

## 2023-12-07 DIAGNOSIS — Z79.01 LONG TERM CURRENT USE OF ANTICOAGULANT THERAPY: ICD-10-CM

## 2023-12-07 DIAGNOSIS — D68.52 PROTHROMBIN GENE MUTATION (H): ICD-10-CM

## 2023-12-07 DIAGNOSIS — Z79.01 LONG TERM CURRENT USE OF ANTICOAGULANT THERAPY: Primary | ICD-10-CM

## 2023-12-07 LAB — INR PPP: 2.51 (ref 0.85–1.15)

## 2023-12-07 PROCEDURE — 85610 PROTHROMBIN TIME: CPT

## 2023-12-07 PROCEDURE — 36415 COLL VENOUS BLD VENIPUNCTURE: CPT

## 2023-12-07 NOTE — TELEPHONE ENCOUNTER
Please see patient's [employer] message below - update on Wellbutrin and Lexapro.    Different prescription?    Please advise, thanks.  Routed to covering providers due to Jessica is out of the clinic today and no one on her team today.

## 2023-12-07 NOTE — PROGRESS NOTES
ANTICOAGULATION MANAGEMENT     Stacey Maki 43 year old female is on warfarin with therapeutic INR result. (Goal INR 2.0-3.0)    Recent labs: (last 7 days)     12/07/23  1622   INR 2.51*       ASSESSMENT     Source(s): Chart Review     Warfarin doses taken: Reviewed in chart  Diet: No new diet changes identified  Medication/supplement changes: None noted  New illness, injury, or hospitalization: No  Signs or symptoms of bleeding or clotting: No  Previous result: Therapeutic last 2(+) visits  Additional findings: None       PLAN     Recommended plan for no diet, medication or health factor changes affecting INR     Dosing Instructions: Continue your current warfarin dose with next INR in 6 weeks       Summary  As of 12/7/2023      Full warfarin instructions:  6 mg every Sun, Wed; 4 mg all other days   Next INR check:  1/18/2024               Detailed voice message left for Stacey with dosing instructions and follow up date.     Contact [phone] to schedule and with any changes, questions or concerns.     Education provided:   Please call back if any changes to your diet, medications or how you've been taking warfarin  Contact [phone] with any changes, questions or concerns.     Plan made per [facility] anticoagulation protocol    Leticia Cho RN  Anticoagulation Clinic  12/7/2023    _______________________________________________________________________     Anticoagulation Episode Summary       Current INR goal:  2.0-3.0   TTR:  100.0% (1 y)   Target end date:  Indefinite   Send INR reminders to:   [facility]    Indications    Long term current use of anticoagulant therapy [Z79.01]  Prothrombin gene mutation (H24) [D68.52]             Comments:               Anticoagulation Care Providers       Provider Role Specialty Phone number    Danny Clark MD Referring Hematology [phone]    Norman Albert PA-C Referring Hematology [phone]

## 2023-12-21 ENCOUNTER — MYC MEDICAL ADVICE (OUTPATIENT)
Dept: HEMATOLOGY | Facility: CLINIC | Age: 43
End: 2023-12-21

## 2023-12-21 ENCOUNTER — TELEPHONE (OUTPATIENT)
Dept: GASTROENTEROLOGY | Facility: CLINIC | Age: 43
End: 2023-12-21

## 2023-12-21 NOTE — TELEPHONE ENCOUNTER
"Endoscopy Scheduling Screen    Have you had a positive Covid test in the last 14 days?  No      Are you active on MyChart?   Yes      What insurance is in the chart?  Other:  med[employer]    Ordering/Referring Provider: aminta perez   (If ordering provider performs procedure, schedule with ordering provider unless otherwise instructed. )    BMI: Estimated body mass index is 29.34 kg/m  as calculated from the following:    Height as of 9/7/23: 1.588 m (5' 2.5\").    Weight as of 9/7/23: 73.9 kg (163 lb).     Sedation Ordered  moderate sedation.   If patient BMI > 50 do not schedule in [facility].    If patient BMI > 45 do not schedule at [facility].    Are you taking methadone or Suboxone?  No      Are you taking any prescription medications for pain 3 or more times per week?   NO - No RN review required.      Do you have a history of malignant hyperthermia or adverse reaction to anesthesia?  No      (Females) Are you currently pregnant?   No       Have you been diagnosed or told you have pulmonary hypertension?   No      Do you have an LVAD?  No      Have you been told you have moderate to severe sleep apnea?  No      Have you been told you have COPD, asthma, or any other lung disease?  No      Do you have any heart conditions?  No       Have you ever had an organ transplant?   No      Have you ever had or are you awaiting a heart or lung transplant?   No      Have you had a stroke or transient ischemic attack (TIA aka \"mini stroke\" in the last 6 months?   No      Have you been diagnosed with or been told you have cirrhosis of the liver?   No      Are you currently on dialysis?   No      Do you need assistance transferring?   No    BMI: Estimated body mass index is 29.34 kg/m  as calculated from the following:    Height as of 9/7/23: 1.588 m (5' 2.5\").    Weight as of 9/7/23: 73.9 kg (163 lb).     Is patients BMI > 40 and scheduling location [facility]?  No      Do you take an injectable medication for weight loss or diabetes (excluding " insulin)?  No      Do you take the medication Naltrexone?  No      Do you take blood thinners?  Yes     Are you taking Effient/Prasugrel?  No, you must contact your prescribing provider for direction on holding or bridging with a different medication.       Prep   Are you currently on dialysis or do you have chronic kidney disease?  No      Do you have a diagnosis of diabetes?  No      Do you have a diagnosis of cystic fibrosis (CF)?  No      On a regular basis do you go 3 -5 days between bowel movements?  No      BMI > 40?  No    Preferred Pharmacy:    [id number] - [place], [place] - [address] [facility] [address]  [address]  [facility] [address]  Phone: [phone] Fax: [phone]    Final Scheduling Details   Colonoscopy prep sent?  Standard MiraLAX    Procedure scheduled  Colonoscopy    Surgeon:  DANNY     Date of procedure:  1/8/24     Pre-OP / PAC:   No - Not required for this site.    Location  [facility] - Patient preference.    Sedation   Moderate Sedation - Per order.      Patient Reminders:   You will receive a call from a Nurse to review instructions and health history.  This assessment must be completed prior to your procedure.  Failure to complete the Nurse assessment may result in the procedure being cancelled.      On the day of your procedure, please designate an adult(s) who can drive you home stay with you for the next 24 hours. The medicines used in the exam will make you sleepy. You will not be able to drive.      You cannot take public transportation, ride share services, or non-medical taxi service without a responsible caregiver.  Medical transport services are allowed with the requirement that a responsible caregiver will receive you at your destination.  We require that drivers and caregivers are confirmed prior to your procedure.    STAFF MESSAGE TO REFERRING PROVIDER REQUESTING REVIEW OF ASSOCIATED DIAGNOSIS.

## 2023-12-27 ENCOUNTER — TELEPHONE (OUTPATIENT)
Dept: ANTICOAGULATION | Facility: CLINIC | Age: 43
End: 2023-12-27

## 2023-12-27 DIAGNOSIS — D68.52 PROTHROMBIN GENE MUTATION (H): ICD-10-CM

## 2023-12-27 DIAGNOSIS — Z79.01 LONG TERM CURRENT USE OF ANTICOAGULANT THERAPY: Primary | ICD-10-CM

## 2023-12-27 NOTE — TELEPHONE ENCOUNTER
Patient is scheduled for a colonoscopy on 1/8/23.  Stacey has received instructions from [facility] clinic to hold warfarin X 5 days without Lovenox bridging. Calendar is updated with recommendations.  My chart message is sent to patient with recommendations

## 2024-01-01 ENCOUNTER — PATIENT OUTREACH (OUTPATIENT)
Dept: CARE COORDINATION | Facility: CLINIC | Age: 44
End: 2024-01-01
Payer: COMMERCIAL

## 2024-01-08 ENCOUNTER — DOCUMENTATION ONLY (OUTPATIENT)
Dept: ANTICOAGULATION | Facility: CLINIC | Age: 44
End: 2024-01-08

## 2024-01-08 ENCOUNTER — HOSPITAL ENCOUNTER (OUTPATIENT)
Facility: CLINIC | Age: 44
Discharge: HOME | End: 2024-01-08
Attending: INTERNAL MEDICINE | Admitting: INTERNAL MEDICINE
Payer: COMMERCIAL

## 2024-01-08 VITALS
DIASTOLIC BLOOD PRESSURE: 64 MMHG | HEIGHT: 63 IN | TEMPERATURE: 97.7 F | BODY MASS INDEX: 28.35 KG/M2 | SYSTOLIC BLOOD PRESSURE: 97 MMHG | OXYGEN SATURATION: 100 % | WEIGHT: 160 LBS | HEART RATE: 80 BPM | RESPIRATION RATE: 16 BRPM

## 2024-01-08 LAB
COLONOSCOPY: NORMAL
INR PPP: 0.99 (ref 0.85–1.15)

## 2024-01-08 PROCEDURE — 250N000013 HC RX MED GY IP 250 OP 250 PS 637: Performed by: INTERNAL MEDICINE

## 2024-01-08 PROCEDURE — 88305 TISSUE EXAM BY PATHOLOGIST: CPT | Mod: 26

## 2024-01-08 PROCEDURE — 250N000011 HC RX IP 250 OP 636: Performed by: INTERNAL MEDICINE

## 2024-01-08 PROCEDURE — 85610 PROTHROMBIN TIME: CPT | Performed by: INTERNAL MEDICINE

## 2024-01-08 PROCEDURE — 88305 TISSUE EXAM BY PATHOLOGIST: CPT | Mod: TC | Performed by: INTERNAL MEDICINE

## 2024-01-08 PROCEDURE — 36415 COLL VENOUS BLD VENIPUNCTURE: CPT | Performed by: INTERNAL MEDICINE

## 2024-01-08 PROCEDURE — 258N000003 HC RX IP 258 OP 636: Performed by: INTERNAL MEDICINE

## 2024-01-08 PROCEDURE — 45385 COLONOSCOPY W/LESION REMOVAL: CPT | Performed by: INTERNAL MEDICINE

## 2024-01-08 PROCEDURE — G0500 MOD SEDAT ENDO SERVICE >5YRS: HCPCS | Performed by: INTERNAL MEDICINE

## 2024-01-08 RX ORDER — ONDANSETRON 2 MG/ML
4 INJECTION INTRAMUSCULAR; INTRAVENOUS
Status: DISCONTINUED | OUTPATIENT
Start: 2024-01-08 | End: 2024-01-08 | Stop reason: HOSPADM

## 2024-01-08 RX ORDER — FENTANYL CITRATE 50 UG/ML
50-100 INJECTION, SOLUTION INTRAMUSCULAR; INTRAVENOUS EVERY 5 MIN PRN
Status: DISCONTINUED | OUTPATIENT
Start: 2024-01-08 | End: 2024-01-08 | Stop reason: HOSPADM

## 2024-01-08 RX ORDER — NALOXONE HYDROCHLORIDE 0.4 MG/ML
0.2 INJECTION, SOLUTION INTRAMUSCULAR; INTRAVENOUS; SUBCUTANEOUS
Status: DISCONTINUED | OUTPATIENT
Start: 2024-01-08 | End: 2024-01-08 | Stop reason: HOSPADM

## 2024-01-08 RX ORDER — ONDANSETRON 4 MG/1
4 TABLET, ORALLY DISINTEGRATING ORAL EVERY 6 HOURS PRN
Status: DISCONTINUED | OUTPATIENT
Start: 2024-01-08 | End: 2024-01-08 | Stop reason: HOSPADM

## 2024-01-08 RX ORDER — NALOXONE HYDROCHLORIDE 0.4 MG/ML
0.4 INJECTION, SOLUTION INTRAMUSCULAR; INTRAVENOUS; SUBCUTANEOUS
Status: DISCONTINUED | OUTPATIENT
Start: 2024-01-08 | End: 2024-01-08 | Stop reason: HOSPADM

## 2024-01-08 RX ORDER — DIPHENHYDRAMINE HYDROCHLORIDE 50 MG/ML
25-50 INJECTION INTRAMUSCULAR; INTRAVENOUS
Status: DISCONTINUED | OUTPATIENT
Start: 2024-01-08 | End: 2024-01-08 | Stop reason: HOSPADM

## 2024-01-08 RX ORDER — ATROPINE SULFATE 0.1 MG/ML
1 INJECTION INTRAVENOUS
Status: DISCONTINUED | OUTPATIENT
Start: 2024-01-08 | End: 2024-01-08 | Stop reason: HOSPADM

## 2024-01-08 RX ORDER — FLUMAZENIL 0.1 MG/ML
0.2 INJECTION, SOLUTION INTRAVENOUS
Status: DISCONTINUED | OUTPATIENT
Start: 2024-01-08 | End: 2024-01-08 | Stop reason: HOSPADM

## 2024-01-08 RX ORDER — LIDOCAINE 40 MG/G
CREAM TOPICAL
Status: DISCONTINUED | OUTPATIENT
Start: 2024-01-08 | End: 2024-01-08 | Stop reason: HOSPADM

## 2024-01-08 RX ORDER — PROCHLORPERAZINE MALEATE 10 MG
10 TABLET ORAL EVERY 6 HOURS PRN
Status: DISCONTINUED | OUTPATIENT
Start: 2024-01-08 | End: 2024-01-08 | Stop reason: HOSPADM

## 2024-01-08 RX ORDER — SIMETHICONE 40MG/0.6ML
133 SUSPENSION, DROPS(FINAL DOSAGE FORM)(ML) ORAL
Status: COMPLETED | OUTPATIENT
Start: 2024-01-08 | End: 2024-01-08

## 2024-01-08 RX ORDER — ONDANSETRON 2 MG/ML
4 INJECTION INTRAMUSCULAR; INTRAVENOUS EVERY 6 HOURS PRN
Status: DISCONTINUED | OUTPATIENT
Start: 2024-01-08 | End: 2024-01-08 | Stop reason: HOSPADM

## 2024-01-08 RX ORDER — EPINEPHRINE 1 MG/ML
0.1 INJECTION, SOLUTION INTRAMUSCULAR; SUBCUTANEOUS
Status: DISCONTINUED | OUTPATIENT
Start: 2024-01-08 | End: 2024-01-08 | Stop reason: HOSPADM

## 2024-01-08 RX ADMIN — MIDAZOLAM HYDROCHLORIDE 2 MG: 1 INJECTION, SOLUTION INTRAMUSCULAR; INTRAVENOUS at 11:03

## 2024-01-08 RX ADMIN — SODIUM CHLORIDE 500 ML: 9 INJECTION, SOLUTION INTRAVENOUS at 11:03

## 2024-01-08 RX ADMIN — SIMETHICONE 133 MG: 20 SUSPENSION/ DROPS ORAL at 11:06

## 2024-01-08 RX ADMIN — FENTANYL CITRATE 100 MCG: 0.05 INJECTION, SOLUTION INTRAMUSCULAR; INTRAVENOUS at 10:59

## 2024-01-08 RX ADMIN — MIDAZOLAM HYDROCHLORIDE 2 MG: 1 INJECTION, SOLUTION INTRAMUSCULAR; INTRAVENOUS at 10:59

## 2024-01-08 ASSESSMENT — ACTIVITIES OF DAILY LIVING (ADL): ADLS_ACUITY_SCORE: 37

## 2024-01-08 NOTE — H&P
[facility] Pre-op History and Physical    Stacey Maki MRN# [id number]   Age: 43 year old YOB: 1980      Date of Surgery: [facility]      Date of Exam 1/8/2024 Facility (Same day)       Home clinic: unknown  Primary care provider: Jessica Golden         Chief Complaint and/or Reason for Procedure:   No chief complaint on file.           Active problem list:     Patient Active Problem List    Diagnosis Date Noted    Moderate episode of recurrent major depressive disorder (H) 11/28/2023     Priority: Medium    CARDIOVASCULAR SCREENING; LDL GOAL LESS THAN 130 09/15/2022     Priority: Medium    Recurrent major depressive disorder, in full remission (H24) 08/20/2021     Priority: Medium    NIKA (generalized anxiety disorder) 08/20/2021     Priority: Medium    Premenstrual dysphoric disorder 11/06/2020     Priority: Medium    Benign essential hypertension 07/24/2020     Priority: Medium    Prothrombin gene mutation (H24) 05/29/2013     Priority: Medium    Long term current use of anticoagulant therapy 03/06/2008     Priority: Medium     Overview:   LW Modifier:  for CVT and poss anticardiolipin AB syn  ; Anticoagulant Therapy      History of cerebral venous sinus thrombosis 10/02/2007     Priority: Medium            Medications (include herbals and vitamins):   Any Plavix use in the last 7 days? No     Current Facility-Administered Medications   Medication    atropine injection 1 mg    benzocaine 20% (HURRICAINE/TOPEX) 20 % spray 0.5 mL    diphenhydrAMINE (BENADRYL) injection 25-50 mg    EPINEPHrine (Anaphylaxis) (ADRENALIN) injection (vial) 0.1 mg    fentaNYL (PF) (SUBLIMAZE) injection  mcg    flumazenil (ROMAZICON) injection 0.2 mg    glucagon injection 0.5 mg    midazolam (VERSED) injection 0.5-2 mg    naloxone (NARCAN) injection 0.2 mg    Or    naloxone (NARCAN) injection 0.4 mg    Or    naloxone (NARCAN) injection 0.2 mg    Or    naloxone  "(NARCAN) injection 0.4 mg    simethicone (MYLICON) suspension 133 mg    sodium chloride (PF) 0.9% PF flush 3 mL    sodium chloride 0.9% BOLUS 500 mL             Allergies:      Allergies   Allergen Reactions    Benzoyl Peroxide Swelling     Swollen eyelids    Adhesive Tape Dermatitis    Seasonal Allergies Other (See Comments)     Runny nose, itchy eyes, breathing issues, and fatigue     Allergy to Latex? No  Allergy to tape?   No  Intolerances: NKDA            Physical Exam:   All vitals have been reviewed  Patient Vitals for the past 8 hrs:   Height Weight   01/08/24 1006 1.6 m (5' 3\") 72.6 kg (160 lb)     No intake/output data recorded.  Airway assessment:   Patient is able to open mouth wide  Patient is able to stick out tongue}              Lab / Radiology Results:             Anesthetic risk and/or ASA classification:       Lisa Ayon MD      "

## 2024-01-08 NOTE — PROGRESS NOTES
ANTICOAGULATION  MANAGEMENT: Discharge Review    Stacey Maki chart reviewed for anticoagulation continuity of care    Outpatient surgery/procedure on 1/8/24 for colonoscopy with polypectomy.    Discharge disposition: Home    Results:    Recent labs: (last 7 days)     01/08/24  1001   INR 0.99     Anticoagulation inpatient management:     not applicable     Anticoagulation discharge instructions:     Warfarin dosing:  Patient will take booster dosing and then resume typical warfarin pattern.  My chart message was reviewed last by patient on 1/5/24   Bridging: No   INR goal change: No      Medication changes affecting anticoagulation: No    Additional factors affecting anticoagulation: No     PLAN     No adjustment to anticoagulation plan needed    Patient not contacted    No adjustment to Anticoagulation Calendar was required    Leticia Cho RN

## 2024-01-09 LAB
PATH REPORT.COMMENTS IMP SPEC: NORMAL
PATH REPORT.COMMENTS IMP SPEC: NORMAL
PATH REPORT.FINAL DX SPEC: NORMAL
PATH REPORT.GROSS SPEC: NORMAL
PATH REPORT.MICROSCOPIC SPEC OTHER STN: NORMAL
PATH REPORT.RELEVANT HX SPEC: NORMAL
PHOTO IMAGE: NORMAL

## 2024-01-12 ENCOUNTER — HOSPITAL ENCOUNTER (OUTPATIENT)
Dept: MAMMOGRAPHY | Facility: CLINIC | Age: 44
Discharge: HOME | End: 2024-01-12
Attending: STUDENT IN AN ORGANIZED HEALTH CARE EDUCATION/TRAINING PROGRAM | Admitting: STUDENT IN AN ORGANIZED HEALTH CARE EDUCATION/TRAINING PROGRAM
Payer: COMMERCIAL

## 2024-01-12 DIAGNOSIS — Z01.419 ENCOUNTER FOR WELL WOMAN EXAM: ICD-10-CM

## 2024-01-12 PROCEDURE — 77063 BREAST TOMOSYNTHESIS BI: CPT

## 2024-01-22 NOTE — PROGRESS NOTES
[facility]    CC: Preventative Exam    Subjective:   Stacey Maki is a 42 year old  who presents for a preventative health exam.     PMH notable for: history of cerebral venous sinus thrombosis with prothrombin gene mutation on long term anticoagulation with warfarin, HTN, PMDD and MDD/NIKA.    Wondering if norethindrone is still the right treatment for her PMDD. Often forgets to take it by an hour or so. Wondering if Mirena would provide similar relief?     OB History:    CSx2    Gyn History:   Menses: Menstrual interval occurs every 60 days. Flow lasts for 5-10 days and is heavy in amount of bleeding. Mild cramping. Some spotting last month, otherwise no breakthrough bleeding or postcoital bleeding.   Contraception: vasectomy, she is not interested in STI screening today.   Sexual activity: has intercourse with 1 male partner   History of STI: none     Doing a dry January and feeling great!   Domestic violence concerns: none    Screening tests:   Pap smear history: last 2019 NILM, HPV neg, no hx of abnormals  HIV neg , Hep C never done  Mammogram: 24 BIRADS-1  Colorectal cancer screening: done 24, multiple benign polyps, recommend repeat in 3 years   Lipid panel: Last 23 Cholesterol 270, TG 76,   Diabetes screening: HgbA1c 5.1% 10/21/22    The 10-year ASCVD risk score (Dang CORDERO, et al., 2019) is: 1.2%    Values used to calculate the score:      Age: 43 years      Sex: Female      Is Non- : No      Diabetic: No      Tobacco smoker: No      Systolic Blood Pressure: 122 mmHg      Is BP treated: Yes      HDL Cholesterol: 67 mg/dL      Total Cholesterol: 270 mg/dL    Immunizations, reviewed:   Does not want influenza    Medical, surgical and family histories, medications and allergies were reviewed and updated.     Soc Hx: Lives with  and two sons. Denies tobacco use. No alcohol this month.     Objective:   /76    "Pulse (!) 123   Ht 1.6 m (5' 3\")   Wt 72.6 kg (160 lb)   LMP 2024 (Exact Date)   BMI 28.34 kg/m    General: Healthy appearing. Alert, oriented. Affect is appropriate.   HEENT: Eyes are normal with clear sclerae. Ears are symmetric.   Heart: Regular rate and rhythm without murmurs.   Lungs: Clear to auscultation. Breathing is unlabored on room air.     Assessment/Plan: Stacey Maki is a 43 year old  who presents for a preventative health exam.    Health Maintenance:   - Mammography: last 24 BIRADS-1  - Colonoscopy: done 24 with removal of some polyps (all benign), recommend repeat in 3 years   - Vaccines: s/p TDaP 23, declines influenza  - Cholesterol screening: Done recently in 2023   - Diabetes screening: hgb A1c 5.31% in 10/2022  - Pap smear: done today, next in 5 years if normal  - Discussed healthy food choices, exercise, mental health, sleep, safety.     PMDD  - currently using norethindrone tabs. Not a candidate for combined hormonal contraceptives. Dicussed Mirena IUD not effective for PMDD tx. Other options would be Slynd or Aygestin. Would like to continue norethindrone for now. Prescription refilled for one year.    MDD/NIKA  - Stopped Lexapro in , feeling more highs and lows for emotions and happy with this.   - Not seeing a therapist currently, but has connections if she needs one     HTN  - managed by IM    Prothrombin gene mutation  - follows with heme, on warfarin    Overweight  - discussed diet and exercise changes    Alcohol use  - congratulated on efforts to decrease use.     Follow-up in 1 year, sooner as needed if any concerns.     Laxmi Waggoner MD          "

## 2024-01-24 ENCOUNTER — MYC MEDICAL ADVICE (OUTPATIENT)
Dept: ANTICOAGULATION | Facility: CLINIC | Age: 44
End: 2024-01-24
Payer: COMMERCIAL

## 2024-01-24 ENCOUNTER — OFFICE VISIT (OUTPATIENT)
Dept: OBGYN | Facility: CLINIC | Age: 44
End: 2024-01-24
Attending: STUDENT IN AN ORGANIZED HEALTH CARE EDUCATION/TRAINING PROGRAM
Payer: COMMERCIAL

## 2024-01-24 VITALS
WEIGHT: 160 LBS | HEART RATE: 123 BPM | HEIGHT: 63 IN | SYSTOLIC BLOOD PRESSURE: 122 MMHG | DIASTOLIC BLOOD PRESSURE: 76 MMHG | BODY MASS INDEX: 28.35 KG/M2

## 2024-01-24 DIAGNOSIS — F32.81 PREMENSTRUAL DYSPHORIC DISORDER: ICD-10-CM

## 2024-01-24 DIAGNOSIS — Z12.4 CERVICAL CANCER SCREENING: Primary | ICD-10-CM

## 2024-01-24 PROCEDURE — 87624 HPV HI-RISK TYP POOLED RSLT: CPT | Performed by: STUDENT IN AN ORGANIZED HEALTH CARE EDUCATION/TRAINING PROGRAM

## 2024-01-24 PROCEDURE — 99396 PREV VISIT EST AGE 40-64: CPT | Performed by: STUDENT IN AN ORGANIZED HEALTH CARE EDUCATION/TRAINING PROGRAM

## 2024-01-24 PROCEDURE — 99213 OFFICE O/P EST LOW 20 MIN: CPT | Performed by: STUDENT IN AN ORGANIZED HEALTH CARE EDUCATION/TRAINING PROGRAM

## 2024-01-24 PROCEDURE — G0145 SCR C/V CYTO,THINLAYER,RESCR: HCPCS | Performed by: STUDENT IN AN ORGANIZED HEALTH CARE EDUCATION/TRAINING PROGRAM

## 2024-01-24 RX ORDER — ACETAMINOPHEN AND CODEINE PHOSPHATE 120; 12 MG/5ML; MG/5ML
0.35 SOLUTION ORAL DAILY
Qty: 84 TABLET | Refills: 4 | Status: SHIPPED | OUTPATIENT
Start: 2024-01-24

## 2024-01-24 NOTE — PATIENT INSTRUCTIONS
Thank you for trusting us with your care!     If you need to contact us for questions about:  Symptoms, Scheduling & Medical Questions; Non-urgent (2-3 day response) Pam message, Urgent (needing response today) [phone] (if after 3:30pm next day response)   Prescriptions: Please call your Pharmacy   Billing: Justin [phone] or [facility]:[phone]

## 2024-01-24 NOTE — LETTER
2024       RE: Stacey Maki  [address] [place] [address]     Dear Colleague,    Thank you for referring your patient, Stacey Maki, to the [facility]. Please see a copy of my visit note below.    [facility]ly [facility]    CC: Preventative Exam    Subjective:   Stacey Maki is a 42 year old  who presents for a preventative health exam.     PMH notable for: history of cerebral venous sinus thrombosis with prothrombin gene mutation on long term anticoagulation with warfarin, HTN, PMDD and MDD/NIKA.    Wondering if norethindrone is still the right treatment for her PMDD. Often forgets to take it by an hour or so. Wondering if Mirena would provide similar relief?     OB History:    CSx2    Gyn History:   Menses: Menstrual interval occurs every 60 days. Flow lasts for 5-10 days and is heavy in amount of bleeding. Mild cramping. Some spotting last month, otherwise no breakthrough bleeding or postcoital bleeding.   Contraception: vasectomy, she is not interested in STI screening today.   Sexual activity: has intercourse with 1 male partner   History of STI: none     Doing a dry January and feeling great!   Domestic violence concerns: none    Screening tests:   Pap smear history: last 2019 NILM, HPV neg, no hx of abnormals  HIV neg , Hep C never done  Mammogram: 24 BIRADS-1  Colorectal cancer screening: done 24, multiple benign polyps, recommend repeat in 3 years   Lipid panel: Last 23 Cholesterol 270, TG 76,   Diabetes screening: HgbA1c 5.1% 10/21/22    The 10-year ASCVD risk score (Dang CORDERO, et al., 2019) is: 1.2%    Values used to calculate the score:      Age: 43 years      Sex: Female      Is Non- : No      Diabetic: No      Tobacco smoker: No      Systolic Blood Pressure: 122 mmHg      Is BP treated: Yes      HDL Cholesterol: 67  "mg/dL      Total Cholesterol: 270 mg/dL    Immunizations, reviewed:   Does not want influenza    Medical, surgical and family histories, medications and allergies were reviewed and updated.     Soc Hx: Lives with  and two sons. Denies tobacco use. No alcohol this month.     Objective:   /76   Pulse (!) 123   Ht 1.6 m (5' 3\")   Wt 72.6 kg (160 lb)   LMP 2024 (Exact Date)   BMI 28.34 kg/m    General: Healthy appearing. Alert, oriented. Affect is appropriate.   HEENT: Eyes are normal with clear sclerae. Ears are symmetric.   Heart: Regular rate and rhythm without murmurs.   Lungs: Clear to auscultation. Breathing is unlabored on room air.     Assessment/Plan: Stacey Maki is a 43 year old  who presents for a preventative health exam.    Health Maintenance:   - Mammography: last 24 BIRADS-1  - Colonoscopy: done 24 with removal of some polyps (all benign), recommend repeat in 3 years   - Vaccines: s/p TDaP 23, declines influenza  - Cholesterol screening: Done recently in 2023   - Diabetes screening: hgb A1c 5.31% in 10/2022  - Pap smear: done today, next in 5 years if normal  - Discussed healthy food choices, exercise, mental health, sleep, safety.     PMDD  - currently using norethindrone tabs. Not a candidate for combined hormonal contraceptives. Dicussed Mirena IUD not effective for PMDD tx. Other options would be Slynd or Aygestin. Would like to continue norethindrone for now. Prescription refilled for one year.    MDD/NIKA  - Stopped Lexapro in , feeling more highs and lows for emotions and happy with this.   - Not seeing a therapist currently, but has connections if she needs one     HTN  - managed by IM    Prothrombin gene mutation  - follows with heme, on warfarin    Overweight  - discussed diet and exercise changes    Alcohol use  - congratulated on efforts to decrease use.     Follow-up in 1 year, sooner as needed if any concerns.     Laxmi Waggoner MD    "

## 2024-01-24 NOTE — PROGRESS NOTES
[facility]  [facility], [address], [place] [address]  Main: [phone], Fax: [phone]    Video Virtual Visit Note:    Patient: Stacey Maki  MRN: [id number]  : 1980  MARTA: 2024  Location of the patient when this video visit is conducted: Patient's office at work.   Location of this writer at the time of this video visit is conducted: [facility].     Due to the ongoing COVID-19 outbreak, this visit was conducted by video, with the patient's approval.    Reason of today's visit:  History of cerebral venous sinus thrombosis on long term anticoagulation therapy. Here for her routine annual follow up clinic visit.      Clinical History Summary:  Stacey Maki is a 43-year-old woman with a history of cerebral venous sinus thrombosis, currently on indefinite anticoagulation therapy with Coumadin, participates in today's scheduled video visit for her routine annual follow up visit. She was last seen by this writer back in 2023.     Thrombosis History Summary:  Estrogen provoked cerebral venous sinus thrombosis in . Found to have heterozygous prothrombin gene mutation.   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 [facility]. According to Dr. Barragan's note on 2013, she has chronic left sigmoid sinus occlusive and occlusion in the upper part of the left internal jugular vein. Additionally, there is a superficial vein that is now draining this sinus. She dose have flow through the right sigmoid sinus, but there are areas of stenosis and irregularity, including an area of stenosis in the high right jugular vein.  She has been on indefinite anticoagulation therapy with warfarin since diagnosis of cerebral sinous  thrombosis with INR goal range of 2.0-3.0.      Interim History:  She is currently on warfarin. Denies any significant bleeding issues.    During her visit with this writer back on 2/2/2023, we had a discussion about transitioning her to rivaroxaban from warfarin. But after that appointment, the patient has decided to stay on warfarin.     1/8/2024, she underwent screening colonoscopy and did need polypectomy. She held her warfarin x 5 days prior without pre or post procedure enoxaparin bridging as instructed by Gifty Brownlee PA-C of this clinic. No complication.     Currently she is on warfarin with her goal INR of 2.0-3.0. Satcey reports that she has been doing well on warfarin and is very content. She denies any bleeding issues. She is not anticipating any invasive or surgical procedures other than possible some skin lesion removal with her dermatologist.      ROS:  Denies any bleeding complications. Specifically, no frequent epistaxis. No issues with oral mucosal bleeding. Denies any hematuria or blood in stools. Denies any shortness of breath. No chest pain. No cough. No fever.      Medications:   Current Outpatient Medications   Medication    escitalopram (LEXAPRO) 10 MG tablet    losartan (COZAAR) 25 MG tablet    norethindrone (MICRONOR) 0.35 MG tablet    warfarin ANTICOAGULANT (COUMADIN) 2 MG tablet     No current facility-administered medications for this visit.       Allergies:   Allergies   Allergen Reactions    Benzoyl Peroxide Swelling     Swollen eyelids    Adhesive Tape Dermatitis    Seasonal Allergies Other (See Comments)     Runny nose, itchy eyes, breathing issues, and fatigue        PMH:   Past Medical History:   Diagnosis Date    Arteriovenous malformation     Asthma     Chronic cerebral venous sinus thrombosis     diagnosed in 2007, 2nd 2009 5wk preg.     Heart disease     mother    Hypertension 2016    mother    Idiopathic intracranial hypertension     Papilledema     Prothrombin gene mutation  (H24)     heterozygous type; on warfarin (clotting disorder)    Tension headache        Social History:   Deferred    Family History:  Deferred    Objective:  Visual Examination via Video:  Pleasant in no acute distress.  Normal work of breathing   A+O x 3    Labs:  Component      Latest Ref Rng 6/2/2023  3:12 PM 7/20/2023  2:30 PM 9/7/2023  2:02 PM 10/17/2023  5:58 PM 12/7/2023  4:22 PM 1/8/2024  10:01 AM   INR      0.85 - 1.15  2.53 (H)  2.43 (H)  2.76 (H)  2.89 (H)  2.51 (H)  0.99       Assessment:  In summary, Stacey is a 43 year old female with a history of cerebral sinus thrombosis back in 2007 that was estrogen provoked who has been on indefinite anticoagulation therapy with Coumadin after she was found to have abnormal cerebral venous flow, participates in today's scheduled video visit for her routine annual follow up visit in regard to chronic anticoagulation therapy use.      Stacey has remain on Coumadin as her mainstay of indefinite anticoagulation therapy with her goal INR of 2.0-3.0. She has done very well with this and has no issues with bleeding complications. Historically, she has not been interested in switching to any of the direct oral anticoagulant agents even after she agreed to transition to rivaroxaban back in Jan 2023 but later decided to stay on warfarin.      Diagnosis:  History of Cerebral Sinus Thrombosis.  Heterozygous Prothrombin Gene Mutation.  On chronic anticoagulation therapy with Coumadin.     Plan:  Stacey remains to be a good candidate to stay on indefinite anticoagulation therapy with warfarin. I again brought up rivaroxaban as an alternative and again answer some of her questions about rivaroxaban. At this time, Stacey is very content with warfarin and does not wish to switch to rivaroxaban. I will keep her INR goal of her warfarin at 2.0-3.0.     She knows to contact our clinic if she should experience any unusual bleeding issues or if she should need any invasive or surgical  procedures in the future. Otherwise, I will plan to see her back in one year for follow up visit. Virtual or in person visit is fine.     The longitudinal plan of care for Stacey Maki was addressed during this visit. Due to the added complexity in care, I will continue to support Stacey in the subsequent management of these conditions and with the ongoing continuity of care of these conditions.    Video-Visit Details:  Type of service:  Video Visit  Video Start Time:  09:53  Video End Time (time video stopped): 10:07  Originating Location (pt. Location): Other Patient's office at work  Distant Location (provider location):  [facility]   Mode of Communication:  Video Conference via [employer]      Norman Albert PA-C, MPAS  Physician Assistant  [facility].     20 minutes spent by me on the date of the encounter doing chart review, history and exam, documentation and further activities per the note

## 2024-01-26 ENCOUNTER — LAB (OUTPATIENT)
Dept: LAB | Facility: CLINIC | Age: 44
End: 2024-01-26
Payer: COMMERCIAL

## 2024-01-26 ENCOUNTER — ANTICOAGULATION THERAPY VISIT (OUTPATIENT)
Dept: ANTICOAGULATION | Facility: CLINIC | Age: 44
End: 2024-01-26

## 2024-01-26 DIAGNOSIS — Z79.01 LONG TERM CURRENT USE OF ANTICOAGULANT THERAPY: ICD-10-CM

## 2024-01-26 DIAGNOSIS — Z79.01 LONG TERM CURRENT USE OF ANTICOAGULANT THERAPY: Primary | ICD-10-CM

## 2024-01-26 DIAGNOSIS — D68.52 PROTHROMBIN GENE MUTATION (H): ICD-10-CM

## 2024-01-26 LAB — INR PPP: 2.48 (ref 0.85–1.15)

## 2024-01-26 PROCEDURE — 85610 PROTHROMBIN TIME: CPT

## 2024-01-26 PROCEDURE — 36415 COLL VENOUS BLD VENIPUNCTURE: CPT

## 2024-01-26 NOTE — PROGRESS NOTES
ANTICOAGULATION MANAGEMENT     Stacey Maki 43 year old female is on warfarin with therapeutic INR result. (Goal INR 2.0-3.0)    Recent labs: (last 7 days)     01/26/24  1643   INR 2.48*       ASSESSMENT     Source(s): Chart Review     Warfarin doses taken: Reviewed in chart  Diet: No new diet changes identified  Medication/supplement changes: None noted  New illness, injury, or hospitalization: No  Signs or symptoms of bleeding or clotting: No  Previous result: Subtherapeutic  (was holding for procedure)  Additional findings: None       PLAN     Recommended plan for no diet, medication or health factor changes affecting INR     Dosing Instructions: Continue your current warfarin dose with next INR in 4 weeks       Summary  As of 1/26/2024      Full warfarin instructions:  6 mg every Sun, Wed; 4 mg all other days   Next INR check:  2/23/2024               Detailed voice message left for Stacey with dosing instructions and follow up date.   Sent [employer] message with dosing and follow up instructions    Contact [phone] to schedule and with any changes, questions or concerns.     Education provided:   Please call back if any changes to your diet, medications or how you've been taking warfarin  Contact [phone] with any changes, questions or concerns.     Plan made per [facility] anticoagulation protocol    Merced Cadet, RN  Anticoagulation Clinic  1/26/2024    _______________________________________________________________________     Anticoagulation Episode Summary       Current INR goal:  2.0-3.0   TTR:  90.8% (1 y)   Target end date:  Indefinite   Send INR reminders to:  [facility]    Indications    Long term current use of anticoagulant therapy [Z79.01]  Prothrombin gene mutation (H24) [D68.52]             Comments:               Anticoagulation Care Providers       Provider Role Specialty Phone number    Danny Clark MD Referring Hematology [phone]    Norman Albert PA-C Referring  Hematology [phone]

## 2024-01-29 LAB
BKR LAB AP GYN ADEQUACY: NORMAL
BKR LAB AP GYN INTERPRETATION: NORMAL
BKR LAB AP HPV REFLEX: NORMAL
BKR LAB AP LMP: NORMAL
BKR LAB AP PREVIOUS ABNORMAL: NORMAL
PATH REPORT.COMMENTS IMP SPEC: NORMAL
PATH REPORT.COMMENTS IMP SPEC: NORMAL
PATH REPORT.RELEVANT HX SPEC: NORMAL

## 2024-01-30 ENCOUNTER — VIRTUAL VISIT (OUTPATIENT)
Dept: HEMATOLOGY | Facility: CLINIC | Age: 44
End: 2024-01-30
Attending: PHYSICIAN ASSISTANT
Payer: COMMERCIAL

## 2024-01-30 DIAGNOSIS — Z86.718 HISTORY OF CEREBRAL VENOUS SINUS THROMBOSIS: ICD-10-CM

## 2024-01-30 DIAGNOSIS — D68.52 PROTHROMBIN GENE MUTATION (H): ICD-10-CM

## 2024-01-30 DIAGNOSIS — Z79.01 LONG TERM CURRENT USE OF ANTICOAGULANT THERAPY: Primary | ICD-10-CM

## 2024-01-30 PROCEDURE — 99213 OFFICE O/P EST LOW 20 MIN: CPT | Mod: 95 | Performed by: PHYSICIAN ASSISTANT

## 2024-01-30 PROCEDURE — G2211 COMPLEX E/M VISIT ADD ON: HCPCS | Mod: 95 | Performed by: PHYSICIAN ASSISTANT

## 2024-01-30 NOTE — PATIENT INSTRUCTIONS
Stacey,    It was nice to see you via video visit today.    Below is a summary of our plan:  Please continue to take your warfarin and dosing as directed by your anticoagulation monitoring clinic staffs. Your INR goal remains to be at 2.0-3.0.   If you should have any further questions, or experience any unusual bleeding issues or if you should need any invasive or surgical procedures in the future, please call us at [phone] and ask to speak to a nursing staff.  One of our administrative assistants will contact you to schedule your return visit with me in one year. Virtual or in person visit is fine.     Thank you once again in choosing our clinic as part of your healthcare team.      Norman Albert PA-C, MPAS  Physician Assistant  [facility].

## 2024-01-31 ENCOUNTER — PATIENT OUTREACH (OUTPATIENT)
Dept: OBGYN | Facility: CLINIC | Age: 44
End: 2024-01-31
Payer: COMMERCIAL

## 2024-01-31 PROBLEM — R87.810 CERVICAL HIGH RISK HPV (HUMAN PAPILLOMAVIRUS) TEST POSITIVE: Status: ACTIVE | Noted: 2024-01-24

## 2024-01-31 LAB
HUMAN PAPILLOMA VIRUS 16 DNA: NEGATIVE
HUMAN PAPILLOMA VIRUS 18 DNA: NEGATIVE
HUMAN PAPILLOMA VIRUS FINAL DIAGNOSIS: ABNORMAL
HUMAN PAPILLOMA VIRUS OTHER HR: POSITIVE

## 2024-02-12 ENCOUNTER — OFFICE VISIT (OUTPATIENT)
Dept: FAMILY MEDICINE | Facility: CLINIC | Age: 44
End: 2024-02-12
Payer: COMMERCIAL

## 2024-02-12 DIAGNOSIS — B35.1 ONYCHOMYCOSIS: Primary | ICD-10-CM

## 2024-02-12 DIAGNOSIS — L70.0 OPEN COMEDONE: ICD-10-CM

## 2024-02-12 PROCEDURE — 10040 EXTRACTION: CPT | Performed by: PHYSICIAN ASSISTANT

## 2024-02-12 PROCEDURE — 99213 OFFICE O/P EST LOW 20 MIN: CPT | Mod: 25 | Performed by: PHYSICIAN ASSISTANT

## 2024-02-12 RX ORDER — CICLOPIROX 80 MG/ML
SOLUTION TOPICAL
Qty: 6.6 ML | Refills: 11 | Status: SHIPPED | OUTPATIENT
Start: 2024-02-12

## 2024-02-12 NOTE — PATIENT INSTRUCTIONS
Patient Education       Proper skin care from [facility]:    -Eliminate harsh soaps as they strip the natural oils from the skin, often resulting in dry itchy skin ( i.e. Dial, Zest, Mongolian Spring)  -Use mild soaps such as Cetaphil or Dove Sensitive Skin in the shower. You do not need to use soap on arms, legs, and trunk every time you shower unless visibly soiled.   -Avoid hot or cold showers.  -After showering, lightly dry off and apply moisturizing within 2-3 minutes. This will help trap moisture in the skin.   -Aggressive use of a moisturizer at least 1-2 times a day to the entire body (including -Vanicream, Cetaphil, Aquaphor or Cerave) and moisturize hands after every washing.  -We recommend using moisturizers that come in a tub that needs to be scooped out, not a pump. This has more of an oil base. It will hold moisture in your skin much better than a water base moisturizer. The above recommended are non-pore clogging.      Wear a sunscreen with at least SPF 30 on your face, ears, neck and V of the chest daily. Wear sunscreen on other areas of the body if those areas are exposed to the sun throughout the day. Sunscreens can contain physical and/or chemical blockers. Physical blockers are less likely to clog pores, these include zinc oxide and titanium dioxide. Reapply every two hour and after swimming.     Sunscreen examples: https://www.ewg.org/sunscreen/    UV radiation  UVA radiation remains constant throughout the day and throughout the year. It is a longer wavelength than UVB and therefore penetrates deeper into the skin leading to immediate and delayed tanning, photoaging, and skin cancer. 70-80% of UVA and UVB radiation occurs between the hours of 10am-2pm.  UVB radiation  UVB radiation causes the most harmful effects and is more significant during the summer months. However, snow and ice can reflect UVB radiation leading to skin damage during the winter months as well. UVB radiation is  responsible for tanning, burning, inflammation, delayed erythema (pinkness), pigmentation (brown spots), and skin cancer.     I recommend self monthly full body exams and yearly full body exams with a dermatology provider. If you develop a new or changing lesion please follow up for examination. Most skin cancers are pink and scaly or pink and pearly. However, we do see blue/brown/black skin cancers.  Consider the ABCDEs of melanoma when giving yourself your monthly full body exam ( don't forget the groin, buttocks, feet, toes, etc). A-asymmetry, B-borders, C-color, D-diameter, E-elevation or evolving. If you see any of these changes please follow up in clinic. If you cannot see your back I recommend purchasing a hand held mirror to use with a larger wall mirror.       Checking for Skin Cancer  You can find cancer early by checking your skin each month. There are 3 kinds of skin cancer. They are melanoma, basal cell carcinoma, and squamous cell carcinoma. Doing monthly skin checks is the best way to find new marks or skin changes. Follow the instructions below for checking your skin.   The ABCDEs of checking moles for melanoma   Check your moles or growths for signs of melanoma using ABCDE:   Asymmetry: the sides of the mole or growth don t match  Border: the edges are ragged, notched, or blurred  Color: the color within the mole or growth varies  Diameter: the mole or growth is larger than 6 mm (size of a pencil eraser)  Evolving: the size, shape, or color of the mole or growth is changing (evolving is not shown in the images below)    Checking for other types of skin cancer  Basal cell carcinoma or squamous cell carcinoma have symptoms such as:     A spot or mole that looks different from all other marks on your skin  Changes in how an area feels, such as itching, tenderness, or pain  Changes in the skin's surface, such as oozing, bleeding, or scaliness  A sore that does not heal  New swelling or redness beyond  the border of a mole    Who s at risk?  Anyone can get skin cancer. But you are at greater risk if you have:   Fair skin, light-colored hair, or light-colored eyes  Many moles or abnormal moles on your skin  A history of sunburns from sunlight or tanning beds  A family history of skin cancer  A history of exposure to radiation or chemicals  A weakened immune system  If you have had skin cancer in the past, you are at risk for recurring skin cancer.   How to check your skin  Do your monthly skin checkups in front of a full-length mirror. Check all parts of your body, including your:   Head (ears, face, neck, and scalp)  Torso (front, back, and sides)  Arms (tops, undersides, upper, and lower armpits)  Hands (palms, backs, and fingers, including under the nails)  Buttocks and genitals  Legs (front, back, and sides)  Feet (tops, soles, toes, including under the nails, and between toes)  If you have a lot of moles, take digital photos of them each month. Make sure to take photos both up close and from a distance. These can help you see if any moles change over time.   Most skin changes are not cancer. But if you see any changes in your skin, call your doctor right away. Only he or she can diagnose a problem. If you have skin cancer, seeing your doctor can be the first step toward getting the treatment that could save your life.   [employer] last reviewed this educational content on 4/1/2019 2000-2020 [employer]. [address] [place] [address], [place] [address]. All rights reserved. This information is not intended as a substitute for professional medical care. Always follow your healthcare professional's instructions.       When should I call my doctor?  If you are worsening or not improving, please, contact us or seek urgent care as noted below.     Who should I call with questions (adults)?  [facility] (adult and pediatric): [phone]  [facility]  [place] (adult): [phone]  [facility] ([place], [place], [place] and [place]) [phone]  For urgent needs outside of business hours call the [facility] at [phone] and ask for the dermatology resident on call to be paged  If this is a medical emergency and you are unable to reach an ER, Call 911      If you need a prescription refill, please contact your pharmacy. Refills are approved or denied by our Physicians during normal business hours, Monday through Fridays  Per office policy, refills will not be granted if you have not been seen within the past year (or sooner depending on your child's condition)

## 2024-02-12 NOTE — LETTER
2/12/2024         RE: Stacey Maki  [address] [place] [address]        Dear Colleague,    Thank you for referring your patient, Stacey Maki, to the [facility]. Please see a copy of my visit note below.    [facility] Dermatology Note  Encounter Date: Feb 12, 2024  Office Visit      Dermatology Problem List:  1. Prothrombin gene mutation  2. Folliculitis - chest  - clindamycin 1% lotion  -previous tx: BPO was  3. Hair loss - androgenetic - 5% minoxidil topically  4. Skin Tags - neck - cryo  5. Hx of DN  -  Junctional dysplastic nevus with mild atypia, R breast, Bx proven on 7/31/23  6. Onychomycosis  - Tx: Penlac   7. Open comedone, Mons pubis, S/p Extraction 2/12/24     FBSE 7/31/23  ____________________________________________    Assessment & Plan:  # Onychomycosis, L 4th toenail   - Start on Penlac  8% solution,  Apply to adjacent skin and affected nails daily. Remove with alcohol every 7 days, then repeat.   - edu that it may take up to 12mo for resolution. Keep nails trimmed short during treatment.     # Open comedone  - Extraction performed today, see procedure note below    Procedures Performed:   Extraction/Acne Surgery was preformed: After verbal consent and review of risk of permanent hyperpigmentation and scar, the area was prepped with alcohol. Approximately 1 lesion was prepped on the mons pubis.  An 11 blade was used to incise the lesion(s) and a comedone extractor was used to express the lesion(s). Vaseine was applied. Wound care was reviewed.The patient tolerated the procedure well and left the Dermatology clinic in good condition.    Follow-up: 1 year(s) in-person, or earlier for new or changing lesions    Staff and scribe     Scribe Disclosure:   JENNIFER DOBBS, am serving as a scribe; to document services personally performed by Nayana Esquivel PA-C -based on data collection and the provider's statements to me.     Provider  Disclosure:  I agree with above History, Review of Systems, Physical exam and Plan.  I have reviewed the content of the documentation and have edited it as needed. I have personally performed the services documented here and the documentation accurately represents those services and the decisions I have made.      Electronically signed by:    All risks, benefits and alternatives were discussed with patient.  Patient is in agreement and understands the assessment and plan.  All questions were answered.    Nayana Esquivel PA-C, MPAS  [facility] [place]: Phone: [phone], Fax: [phone]  [facility]: Phone: [phone],  Fax: [phone]  [facility]: Phone: [phone], Fax: [phone]  ____________________________________________    CC: Derm Problem (Check toe nail/Check spot on  mons pubis)      Reviewed patients past medical history and pertinent chart review prior to patient's visit today.     HPI:  Ms. Stacey Maki is a 43 year old female who presents today as a return patient for spot check.     Today patient is reporting a spot of concern on her mons pubis. She reported that she may have onychomycosis.    Patient is otherwise feeling well, without additional concerns.    Labs:  N/A    Physical Exam:  Vitals: LMP 01/04/2024 (Exact Date)   SKIN: Focused examination of bilat feet was performed.   - yellow discoloration and hyperkeratosis noted on her L 4th toe nail   - Mon pubis there is an open comedone x1  - No other lesions of concern on areas examined.       Medications:  Current Outpatient Medications   Medication     losartan (COZAAR) 25 MG tablet     norethindrone (MICRONOR) 0.35 MG tablet     warfarin ANTICOAGULANT (COUMADIN) 2 MG tablet     No current facility-administered medications for this visit.      Past Medical/Surgical History:   Patient Active Problem List   Diagnosis     Prothrombin  gene mutation (H24)     Long term current use of anticoagulant therapy     History of cerebral venous sinus thrombosis     Benign essential hypertension     Premenstrual dysphoric disorder     Recurrent major depressive disorder, in full remission (H24)     NIKA (generalized anxiety disorder)     CARDIOVASCULAR SCREENING; LDL GOAL LESS THAN 130     Moderate episode of recurrent major depressive disorder (H)     Cervical high risk HPV (human papillomavirus) test positive     Past Medical History:   Diagnosis Date     Arteriovenous malformation      Asthma      Chronic cerebral venous sinus thrombosis     diagnosed in 2007, 2nd 2009 5wk preg.      Heart disease     mother     Hypertension 2016    mother     Idiopathic intracranial hypertension      Papilledema      Prothrombin gene mutation (H24)     heterozygous type; on warfarin (clotting disorder)     Tension headache                         Again, thank you for allowing me to participate in the care of your patient.        Sincerely,        Nayana Esquivel PA-C

## 2024-02-12 NOTE — PROGRESS NOTES
[facility] Dermatology Note  Encounter Date: Feb 12, 2024  Office Visit      Dermatology Problem List:  1. Prothrombin gene mutation  2. Folliculitis - chest  - clindamycin 1% lotion  -previous tx: BPO was  3. Hair loss - androgenetic - 5% minoxidil topically  4. Skin Tags - neck - cryo  5. Hx of DN  -  Junctional dysplastic nevus with mild atypia, R breast, Bx proven on 7/31/23  6. Onychomycosis  - Tx: Penlac   7. Open comedone, Mons pubis, S/p Extraction 2/12/24     FBSE 7/31/23  ____________________________________________    Assessment & Plan:  # Onychomycosis, L 4th toenail   - Start on Penlac  8% solution,  Apply to adjacent skin and affected nails daily. Remove with alcohol every 7 days, then repeat.   - edu that it may take up to 12mo for resolution. Keep nails trimmed short during treatment.     # Open comedone  - Extraction performed today, see procedure note below    Procedures Performed:   Extraction/Acne Surgery was preformed: After verbal consent and review of risk of permanent hyperpigmentation and scar, the area was prepped with alcohol. Approximately 1 lesion was prepped on the mons pubis.  An 11 blade was used to incise the lesion(s) and a comedone extractor was used to express the lesion(s). Vaseine was applied. Wound care was reviewed.The patient tolerated the procedure well and left the Dermatology clinic in good condition.    Follow-up: 1 year(s) in-person, or earlier for new or changing lesions    Staff and scribe     Scribe Disclosure:   I, JENNIFER MEAD, am serving as a scribe; to document services personally performed by Nayana Esquivel PA-C -based on data collection and the provider's statements to me.     Provider Disclosure:  I agree with above History, Review of Systems, Physical exam and Plan.  I have reviewed the content of the documentation and have edited it as needed. I have personally performed the services documented here and the documentation accurately  represents those services and the decisions I have made.      Electronically signed by:    All risks, benefits and alternatives were discussed with patient.  Patient is in agreement and understands the assessment and plan.  All questions were answered.    Nayana Esquivel PA-C, MPAS  [facility]: Phone: [phone], Fax: [phone]  [facility]: Phone: [phone],  Fax: [phone]  [facility]: Phone: [phone], Fax: [phone]  ____________________________________________    CC: Derm Problem (Check toe nail/Check spot on  mons pubis)      Reviewed patients past medical history and pertinent chart review prior to patient's visit today.     HPI:  Ms. Stacey Maki is a 43 year old female who presents today as a return patient for spot check.     Today patient is reporting a spot of concern on her mons pubis. She reported that she may have onychomycosis.    Patient is otherwise feeling well, without additional concerns.    Labs:  N/A    Physical Exam:  Vitals: LMP 01/04/2024 (Exact Date)   SKIN: Focused examination of bilat feet was performed.   - yellow discoloration and hyperkeratosis noted on her L 4th toe nail   - Mon pubis there is an open comedone x1  - No other lesions of concern on areas examined.       Medications:  Current Outpatient Medications   Medication    losartan (COZAAR) 25 MG tablet    norethindrone (MICRONOR) 0.35 MG tablet    warfarin ANTICOAGULANT (COUMADIN) 2 MG tablet     No current facility-administered medications for this visit.      Past Medical/Surgical History:   Patient Active Problem List   Diagnosis    Prothrombin gene mutation (H24)    Long term current use of anticoagulant therapy    History of cerebral venous sinus thrombosis    Benign essential hypertension    Premenstrual dysphoric disorder    Recurrent major depressive disorder, in full remission (H24)    NIKA  (generalized anxiety disorder)    CARDIOVASCULAR SCREENING; LDL GOAL LESS THAN 130    Moderate episode of recurrent major depressive disorder (H)    Cervical high risk HPV (human papillomavirus) test positive     Past Medical History:   Diagnosis Date    Arteriovenous malformation     Asthma     Chronic cerebral venous sinus thrombosis     diagnosed in 2007, 2nd 2009 5wk preg.     Heart disease     mother    Hypertension 2016    mother    Idiopathic intracranial hypertension     Papilledema     Prothrombin gene mutation (H24)     heterozygous type; on warfarin (clotting disorder)    Tension headache

## 2024-03-22 NOTE — PROGRESS NOTES
ANTICOAGULATION MANAGEMENT     Stacey Maki 42 year old female is on warfarin with therapeutic INR result. (Goal INR 2.0-3.0)    Recent labs: (last 7 days)     06/21/22  1534   INR 2.35*       ASSESSMENT       Source(s): Chart Review    Previous INR was Therapeutic last 2(+) visits    Medication, diet, health changes since last INR chart reviewed; none identified           PLAN     Recommended plan for no diet, medication or health factor changes affecting INR     Dosing Instructions: continue your current warfarin dose with next INR in 4 weeks       Summary  As of 6/21/2022    Full warfarin instructions:  6 mg every Mon, Wed, Fri; 4 mg all other days   Next INR check:  7/19/2022             Detailed voice message left for Stacey with dosing instructions and follow up date.     Contact [phone] to schedule and with any changes, questions or concerns.     Education provided: Please call back if any changes to your diet, medications or how you've been taking warfarin and Contact [phone] with any changes, questions or concerns.     Plan made per [facility] anticoagulation protocol    Praveena Blackwood RN  Anticoagulation Clinic  6/21/2022    _______________________________________________________________________     Anticoagulation Episode Summary     Current INR goal:  2.0-3.0   TTR:  82.4 % (1 y)   Target end date:  Indefinite   Send INR reminders to:  [facility]    Indications    Long term current use of anticoagulant therapy [Z79.01]  Prothrombin gene mutation (H) [D68.52]           Comments:           Anticoagulation Care Providers     Provider Role Specialty Phone number    Danny Clark MD Referring Hematology [phone]          
None

## 2024-03-26 ENCOUNTER — MYC MEDICAL ADVICE (OUTPATIENT)
Dept: ANTICOAGULATION | Facility: CLINIC | Age: 44
End: 2024-03-26
Payer: COMMERCIAL

## 2024-03-28 ENCOUNTER — LAB (OUTPATIENT)
Dept: LAB | Facility: CLINIC | Age: 44
End: 2024-03-28
Payer: COMMERCIAL

## 2024-03-28 ENCOUNTER — ANTICOAGULATION THERAPY VISIT (OUTPATIENT)
Dept: ANTICOAGULATION | Facility: CLINIC | Age: 44
End: 2024-03-28

## 2024-03-28 DIAGNOSIS — Z79.01 LONG TERM CURRENT USE OF ANTICOAGULANT THERAPY: ICD-10-CM

## 2024-03-28 DIAGNOSIS — D68.52 PROTHROMBIN GENE MUTATION (H): ICD-10-CM

## 2024-03-28 DIAGNOSIS — Z79.01 LONG TERM CURRENT USE OF ANTICOAGULANT THERAPY: Primary | ICD-10-CM

## 2024-03-28 LAB — INR PPP: 2.66 (ref 0.85–1.15)

## 2024-03-28 PROCEDURE — 36415 COLL VENOUS BLD VENIPUNCTURE: CPT

## 2024-03-28 PROCEDURE — 85610 PROTHROMBIN TIME: CPT

## 2024-03-28 NOTE — PROGRESS NOTES
ANTICOAGULATION MANAGEMENT     Stacey Maki 43 year old female is on warfarin with therapeutic INR result. (Goal INR 2.0-3.0)    Recent labs: (last 7 days)     03/28/24  1657   INR 2.66*       ASSESSMENT     Source(s): Chart Review     Warfarin doses taken: Reviewed in chart  Diet: No new diet changes identified  Medication/supplement changes: None noted  New illness, injury, or hospitalization: No  Signs or symptoms of bleeding or clotting: No  Previous result: Therapeutic last 2(+) visits  Additional findings: None       PLAN     Recommended plan for no diet, medication or health factor changes affecting INR     Dosing Instructions: Continue your current warfarin dose with next INR in 6 weeks       Summary  As of 3/28/2024      Full warfarin instructions:  6 mg every Sun, Wed; 4 mg all other days   Next INR check:  5/9/2024               Detailed voice message left for Stacey with dosing instructions and follow up date.     Contact [phone] to schedule and with any changes, questions or concerns.     Education provided:   Please call back if any changes to your diet, medications or how you've been taking warfarin  Contact [phone] with any changes, questions or concerns.     Plan made per [facility] anticoagulation protocol    Leticia Cho RN  Anticoagulation Clinic  3/28/2024    _______________________________________________________________________     Anticoagulation Episode Summary       Current INR goal:  2.0-3.0   TTR:  90.8% (1 y)   Target end date:  Indefinite   Send INR reminders to:   [facility]    Indications    Long term current use of anticoagulant therapy [Z79.01]  Prothrombin gene mutation (H24) [D68.52]             Comments:               Anticoagulation Care Providers       Provider Role Specialty Phone number    Danny Clark MD Referring Hematology [phone]    Norman Albert PA-C Referring Hematology [phone]

## 2024-05-16 ENCOUNTER — MYC MEDICAL ADVICE (OUTPATIENT)
Dept: ANTICOAGULATION | Facility: CLINIC | Age: 44
End: 2024-05-16
Payer: COMMERCIAL

## 2024-05-28 ENCOUNTER — MYC MEDICAL ADVICE (OUTPATIENT)
Dept: ANTICOAGULATION | Facility: CLINIC | Age: 44
End: 2024-05-28
Payer: COMMERCIAL

## 2024-05-29 ENCOUNTER — LAB (OUTPATIENT)
Dept: LAB | Facility: CLINIC | Age: 44
End: 2024-05-29
Payer: COMMERCIAL

## 2024-05-29 ENCOUNTER — ANTICOAGULATION THERAPY VISIT (OUTPATIENT)
Dept: ANTICOAGULATION | Facility: CLINIC | Age: 44
End: 2024-05-29

## 2024-05-29 DIAGNOSIS — Z79.01 LONG TERM CURRENT USE OF ANTICOAGULANT THERAPY: Primary | ICD-10-CM

## 2024-05-29 DIAGNOSIS — D68.52 PROTHROMBIN GENE MUTATION (H): ICD-10-CM

## 2024-05-29 DIAGNOSIS — Z79.01 LONG TERM CURRENT USE OF ANTICOAGULANT THERAPY: ICD-10-CM

## 2024-05-29 LAB — INR PPP: 3.02 (ref 0.85–1.15)

## 2024-05-29 PROCEDURE — 36415 COLL VENOUS BLD VENIPUNCTURE: CPT

## 2024-05-29 PROCEDURE — 85610 PROTHROMBIN TIME: CPT

## 2024-05-29 NOTE — PROGRESS NOTES
ANTICOAGULATION MANAGEMENT     Stacey Maki 44 year old female is on warfarin with supratherapeutic INR result. (Goal INR 2.0-3.0)    Recent labs: (last 7 days)     05/29/24  1655   INR 3.02*       ASSESSMENT     Source(s): Chart Review  Previous INR was Therapeutic last 2(+) visits  Medication, diet, health changes since last INR chart reviewed; none identified         PLAN     Unable to reach Stacey today.    My chart messaging sent for follow up. If no changes can go 6 weeks for recheck, if any changes would recommend checking in 2 weeks to see if inr continues above 3.0 (plan made with [facility] Nidhi Regan)    Follow up required to confirm warfarin dose taken and assess for changes and discuss dosing instructions and confirm understanding of instructions    Ruthie Jones RN  Anticoagulation Clinic  5/29/2024

## 2024-05-30 NOTE — PROGRESS NOTES
Message is left for patient to call [facility] if there were changes with meds, health or diet.  Writer requested an INR check in 2 weeks if there were changes.  Patient can go 6 weeks in between draws if no changes.

## 2024-06-04 NOTE — PROGRESS NOTES
Stacey responds to my chart message.  Patient was in [place] for 2 weeks prior INR check.  Patient also reports that she has been taking 6mg Wed and 4mg all other days for a long time.  Calendar is updated.

## 2024-06-27 ENCOUNTER — DOCUMENTATION ONLY (OUTPATIENT)
Dept: ANTICOAGULATION | Facility: CLINIC | Age: 44
End: 2024-06-27
Payer: COMMERCIAL

## 2024-06-27 DIAGNOSIS — Z79.01 LONG TERM CURRENT USE OF ANTICOAGULANT THERAPY: Primary | ICD-10-CM

## 2024-06-27 DIAGNOSIS — D68.52 PROTHROMBIN GENE MUTATION (H): ICD-10-CM

## 2024-06-27 NOTE — PROGRESS NOTES
ANTICOAGULATION CLINIC REFERRAL RENEWAL REQUEST       An annual renewal order is required for all patients referred to [facility].?  Please review and sign the pended referral order for Stacey Maki.       ANTICOAGULATION SUMMARY      Warfarin indication(s)   Prothrombin Gene Mutation    Mechanical heart valve present?  NO       Current goal range   INR: 2.0-3.0     Goal appropriate for indication? Goal INR 2-3, standard for indication(s) above     Time in Therapeutic Range (TTR)  (Goal > 60%) 90.8%       Office visit with referring provider's group within last year yes on 1/30/24       KAYE GILMAN RN  [facility]

## 2024-07-15 ENCOUNTER — DOCUMENTATION ONLY (OUTPATIENT)
Dept: ANTICOAGULATION | Facility: CLINIC | Age: 44
End: 2024-07-15
Payer: COMMERCIAL

## 2024-07-15 NOTE — PROGRESS NOTES
ANTICOAGULATION     Stacey Maki is overdue for an INR check.     Reminder letter sent via [employer]    Enedina Avilez RN

## 2024-07-19 ENCOUNTER — ANTICOAGULATION THERAPY VISIT (OUTPATIENT)
Dept: ANTICOAGULATION | Facility: CLINIC | Age: 44
End: 2024-07-19

## 2024-07-19 ENCOUNTER — LAB (OUTPATIENT)
Dept: LAB | Facility: CLINIC | Age: 44
End: 2024-07-19
Payer: COMMERCIAL

## 2024-07-19 DIAGNOSIS — D68.52 PROTHROMBIN GENE MUTATION (H): ICD-10-CM

## 2024-07-19 DIAGNOSIS — Z79.01 LONG TERM CURRENT USE OF ANTICOAGULANT THERAPY: ICD-10-CM

## 2024-07-19 DIAGNOSIS — Z79.01 LONG TERM CURRENT USE OF ANTICOAGULANT THERAPY: Primary | ICD-10-CM

## 2024-07-19 LAB — INR PPP: 2.27 (ref 0.85–1.15)

## 2024-07-19 PROCEDURE — 85610 PROTHROMBIN TIME: CPT

## 2024-07-19 PROCEDURE — 36415 COLL VENOUS BLD VENIPUNCTURE: CPT

## 2024-07-19 NOTE — PROGRESS NOTES
ANTICOAGULATION MANAGEMENT     Stacey Maki 44 year old female is on warfarin with therapeutic INR result. (Goal INR 2.0-3.0)    Recent labs: (last 7 days)     07/19/24  0039   INR 2.27*       ASSESSMENT     Source(s): Chart Review  Previous INR was Therapeutic last 2(+) visits  Medication, diet, health changes since last INR chart reviewed; none identified    I left a detailed voicemail with the orders reflected in flowsheet. I have also requested a call back if there have been any missed doses, concerns, illness, fever, or if there have been any changes in medications, activity level, or diet       PLAN     Recommended plan for no diet, medication or health factor changes affecting INR     Dosing Instructions: Continue your current warfarin dose with next INR in 6 weeks       Summary  As of 7/19/2024      Full warfarin instructions:  6 mg every Wed; 4 mg all other days   Next INR check:  8/30/2024               Detailed voice message left for Stacey with dosing instructions and follow up date.     Contact [phone] to schedule and with any changes, questions or concerns.     Education provided: Please call back if any changes to your diet, medications or how you've been taking warfarin    Plan made per [facility] anticoagulation protocol    Enedina Avilez, RN  Anticoagulation Clinic  7/19/2024    _______________________________________________________________________     Anticoagulation Episode Summary       Current INR goal:  2.0-3.0   TTR:  89.5% (1 y)   Target end date:  Indefinite   Send INR reminders to:  [facility]    Indications    Long term current use of anticoagulant therapy [Z79.01]  Prothrombin gene mutation (H24) [D68.52]             Comments:               Anticoagulation Care Providers       Provider Role Specialty Phone number    Danny Clark MD Referring Hematology [phone]    Norman Albert PA-C Referring Hematology [phone]

## 2024-08-09 DIAGNOSIS — I10 BENIGN ESSENTIAL HYPERTENSION: ICD-10-CM

## 2024-08-09 RX ORDER — LOSARTAN POTASSIUM 25 MG/1
25 TABLET ORAL DAILY
Qty: 90 TABLET | Refills: 0 | Status: SHIPPED | OUTPATIENT
Start: 2024-08-09

## 2024-09-10 ENCOUNTER — DOCUMENTATION ONLY (OUTPATIENT)
Dept: ANTICOAGULATION | Facility: CLINIC | Age: 44
End: 2024-09-10
Payer: COMMERCIAL

## 2024-09-10 NOTE — PROGRESS NOTES
ANTICOAGULATION     Stacey Maki is overdue for an INR check.     Reminder sent via [employer]     Enedina Avilez, RN  9/10/2024  Anticoagulation Clinic  [facility] for routing messages: lisa PADILLA [facility]  [facility] patient phone line: [phone]

## 2024-09-12 ENCOUNTER — LAB (OUTPATIENT)
Dept: LAB | Facility: CLINIC | Age: 44
End: 2024-09-12
Payer: COMMERCIAL

## 2024-09-12 ENCOUNTER — ANTICOAGULATION THERAPY VISIT (OUTPATIENT)
Dept: ANTICOAGULATION | Facility: CLINIC | Age: 44
End: 2024-09-12

## 2024-09-12 DIAGNOSIS — D68.52 PROTHROMBIN GENE MUTATION (H): ICD-10-CM

## 2024-09-12 DIAGNOSIS — Z79.01 LONG TERM CURRENT USE OF ANTICOAGULANT THERAPY: ICD-10-CM

## 2024-09-12 DIAGNOSIS — Z79.01 LONG TERM CURRENT USE OF ANTICOAGULANT THERAPY: Primary | ICD-10-CM

## 2024-09-12 LAB — INR PPP: 2.44 (ref 0.85–1.15)

## 2024-09-12 PROCEDURE — 85610 PROTHROMBIN TIME: CPT

## 2024-09-12 PROCEDURE — 36415 COLL VENOUS BLD VENIPUNCTURE: CPT

## 2024-09-12 NOTE — PROGRESS NOTES
ANTICOAGULATION MANAGEMENT     Stacey Maki 44 year old female is on warfarin with therapeutic INR result. (Goal INR 2.0-3.0)    Recent labs: (last 7 days)     09/12/24  1039   INR 2.44*       ASSESSMENT     Source(s): Chart Review  Previous INR was Therapeutic last 2(+) visits  Medication, diet, health changes since last INR chart reviewed; none identified         PLAN     Recommended plan for no diet, medication or health factor changes affecting INR     Dosing Instructions: Continue your current warfarin dose with next INR in 6 weeks       Summary  As of 9/12/2024      Full warfarin instructions:  6 mg every Wed; 4 mg all other days   Next INR check:  10/24/2024               Telephone call with Stacey who verbalizes understanding and agrees to plan  Sent [employer] message with dosing and follow up instructions    Contact [phone] to schedule and with any changes, questions or concerns.     Education provided: Please call back if any changes to your diet, medications or how you've been taking warfarin    Plan made per [facility] anticoagulation protocol    Caesar Dumont RN  9/12/2024  Anticoagulation Clinic  [place] for routing messages: lisa [facility] patient phone line: [phone]        _______________________________________________________________________     Anticoagulation Episode Summary       Current INR goal:  2.0-3.0   TTR:  89.5% (1 y)   Target end date:  Indefinite   Send INR reminders to:  [facility]    Indications    Long term current use of anticoagulant therapy [Z79.01]  Prothrombin gene mutation (H24) [D68.52]             Comments:               Anticoagulation Care Providers       Provider Role Specialty Phone number    Danny Clark MD Referring Hematology [phone]    Norman Albert PA-C Referring Hematology [phone]

## 2024-09-13 ENCOUNTER — MYC MEDICAL ADVICE (OUTPATIENT)
Dept: INTERNAL MEDICINE | Facility: CLINIC | Age: 44
End: 2024-09-13
Payer: COMMERCIAL

## 2024-10-08 ENCOUNTER — TELEPHONE (OUTPATIENT)
Dept: DERMATOLOGY | Facility: CLINIC | Age: 44
End: 2024-10-08
Payer: COMMERCIAL

## 2024-10-08 NOTE — TELEPHONE ENCOUNTER
S/w pt and advised need to change time on Monday January 6th to 1:30 pm instead of 1:45 pm due to hair loss diagnosis.  Pt states understanding and updated her calendar.    Marci GEORGE RN  [facility] [place]  [phone]

## 2024-10-08 NOTE — TELEPHONE ENCOUNTER
[facility] Call Center    Phone Message    May a detailed message be left on voicemail: yes     Reason for Call: Other: Please see 1/6 appointment for hair loss. Patient is a return patient for [facility] but new to hair loss appointment type. Scheduled as Return. Please review and confirm if there is an issue a scheduled. Thank you.      Action Taken: Other: [facility]    Travel Screening: Not Applicable

## 2024-10-27 ENCOUNTER — MYC REFILL (OUTPATIENT)
Dept: INTERNAL MEDICINE | Facility: CLINIC | Age: 44
End: 2024-10-27
Payer: COMMERCIAL

## 2024-10-27 DIAGNOSIS — I10 BENIGN ESSENTIAL HYPERTENSION: ICD-10-CM

## 2024-10-29 RX ORDER — LOSARTAN POTASSIUM 25 MG/1
25 TABLET ORAL DAILY
Qty: 90 TABLET | Refills: 0 | Status: SHIPPED | OUTPATIENT
Start: 2024-10-29

## 2024-11-04 ENCOUNTER — MYC MEDICAL ADVICE (OUTPATIENT)
Dept: ANTICOAGULATION | Facility: CLINIC | Age: 44
End: 2024-11-04
Payer: COMMERCIAL

## 2024-11-06 ENCOUNTER — TELEPHONE (OUTPATIENT)
Dept: HEMATOLOGY | Facility: CLINIC | Age: 44
End: 2024-11-06
Payer: COMMERCIAL

## 2024-11-07 ENCOUNTER — LAB (OUTPATIENT)
Dept: LAB | Facility: CLINIC | Age: 44
End: 2024-11-07
Payer: COMMERCIAL

## 2024-11-07 ENCOUNTER — ANTICOAGULATION THERAPY VISIT (OUTPATIENT)
Dept: ANTICOAGULATION | Facility: CLINIC | Age: 44
End: 2024-11-07

## 2024-11-07 DIAGNOSIS — Z79.01 LONG TERM CURRENT USE OF ANTICOAGULANT THERAPY: ICD-10-CM

## 2024-11-07 DIAGNOSIS — D68.52 PROTHROMBIN GENE MUTATION (H): ICD-10-CM

## 2024-11-07 DIAGNOSIS — Z79.01 LONG TERM CURRENT USE OF ANTICOAGULANT THERAPY: Primary | ICD-10-CM

## 2024-11-07 LAB — INR PPP: 2.82 (ref 0.85–1.15)

## 2024-11-07 PROCEDURE — 85610 PROTHROMBIN TIME: CPT

## 2024-11-07 PROCEDURE — 36415 COLL VENOUS BLD VENIPUNCTURE: CPT

## 2024-11-07 NOTE — PROGRESS NOTES
ANTICOAGULATION MANAGEMENT     Stacey Maki 44 year old female is on warfarin with therapeutic INR result. (Goal INR 2.0-3.0)    Recent labs: (last 7 days)     11/07/24  1528   INR 2.82*       ASSESSMENT     Source(s): Chart Review and Patient/Caregiver Call     Warfarin doses taken: Reviewed in chart  Diet: No new diet changes identified  Medication/supplement changes: None noted  New illness, injury, or hospitalization: No  Signs or symptoms of bleeding or clotting: No  Previous result: Therapeutic last 2(+) visits  Additional findings: None       PLAN     Recommended plan for no diet, medication or health factor changes affecting INR     Dosing Instructions: Continue your current warfarin dose with next INR in 6 weeks       Summary  As of 11/7/2024      Full warfarin instructions:  6 mg every Wed; 4 mg all other days   Next INR check:  12/26/2024               Detailed voice message left for Stacey with dosing instructions and follow up date.     Contact [phone] to schedule and with any changes, questions or concerns.     Education provided: Please call back if any changes to your diet, medications or how you've been taking warfarin  Contact [phone] with any changes, questions or concerns.     Plan made per [facility] anticoagulation protocol    Leticia Cho RN  11/7/2024  Anticoagulation Clinic  [employer] for routing messages: p  ANTICOAG [facility] patient phone line: [phone]        _______________________________________________________________________     Anticoagulation Episode Summary       Current INR goal:  2.0-3.0   TTR:  89.5% (1 y)   Target end date:  Indefinite   Send INR reminders to:   ANTICO[facility]    Indications    Long term current use of anticoagulant therapy [Z79.01]  Prothrombin gene mutation (H) [D68.52]             Comments:  --             Anticoagulation Care Providers       Provider Role Specialty Phone number    Danny Clark MD Referring Hematology [phone]     Norman Albert PA-C Referring Hematology [phone]

## 2024-11-19 DIAGNOSIS — Z79.01 LONG TERM CURRENT USE OF ANTICOAGULANT THERAPY: ICD-10-CM

## 2024-11-19 DIAGNOSIS — D68.52 PROTHROMBIN GENE MUTATION (H): ICD-10-CM

## 2024-11-19 RX ORDER — WARFARIN SODIUM 2 MG/1
TABLET ORAL
Qty: 252 TABLET | Refills: 1 | Status: SHIPPED | OUTPATIENT
Start: 2024-11-19

## 2024-11-19 NOTE — TELEPHONE ENCOUNTER
ANTICOAGULATION MANAGEMENT:  Medication Refill    Anticoagulation Summary  As of 11/7/2024      Warfarin maintenance plan:  6 mg (2 mg x 3) every Wed; 4 mg (2 mg x 2) all other days   Next INR check:  12/26/2024   Target end date:  Indefinite    Indications    Long term current use of anticoagulant therapy [Z79.01]  Prothrombin gene mutation (H) [D68.52]                 Anticoagulation Care Providers       Provider Role Specialty Phone number    Danny Clark MD Referring Hematology [phone]    Norman Albert PA-C Referring Hematology [phone]            Refill Criteria    Visit with referring provider/group: Meets criteria: visit within referring provider group in the last 15 months on 1/30/24    [facility] referral last signed: 06/27/2024; within last year: Yes    Lab monitoring not exceeding 2 weeks overdue: Yes     Stacey meets all criteria for refill. Rx instructions and quantity supplied updated to match patient's current dosing plan. Warfarin 90 day supply with 1 refill granted per [facility] protocol     KAYE GILMAN RN  Anticoagulation Clinic

## 2024-12-26 ENCOUNTER — PATIENT OUTREACH (OUTPATIENT)
Dept: CARE COORDINATION | Facility: CLINIC | Age: 44
End: 2024-12-26
Payer: COMMERCIAL

## 2025-01-02 ENCOUNTER — MYC MEDICAL ADVICE (OUTPATIENT)
Dept: ANTICOAGULATION | Facility: CLINIC | Age: 45
End: 2025-01-02
Payer: COMMERCIAL

## 2025-01-06 ENCOUNTER — ANTICOAGULATION THERAPY VISIT (OUTPATIENT)
Dept: ANTICOAGULATION | Facility: CLINIC | Age: 45
End: 2025-01-06

## 2025-01-06 ENCOUNTER — LAB (OUTPATIENT)
Dept: LAB | Facility: CLINIC | Age: 45
End: 2025-01-06
Payer: COMMERCIAL

## 2025-01-06 DIAGNOSIS — D68.52 PROTHROMBIN GENE MUTATION: ICD-10-CM

## 2025-01-06 DIAGNOSIS — Z79.01 LONG TERM CURRENT USE OF ANTICOAGULANT THERAPY: Primary | ICD-10-CM

## 2025-01-06 DIAGNOSIS — Z79.01 LONG TERM CURRENT USE OF ANTICOAGULANT THERAPY: ICD-10-CM

## 2025-01-06 LAB — INR PPP: 2.11 (ref 0.85–1.15)

## 2025-01-06 PROCEDURE — 85610 PROTHROMBIN TIME: CPT

## 2025-01-06 PROCEDURE — 36415 COLL VENOUS BLD VENIPUNCTURE: CPT

## 2025-01-06 NOTE — PROGRESS NOTES
ANTICOAGULATION MANAGEMENT     Stacey Maki 44 year old female is on warfarin with therapeutic INR result. (Goal INR 2.0-3.0)    Recent labs: (last 7 days)     01/06/25  1443   INR 2.11*       ASSESSMENT     Source(s): Chart Review  Previous INR was Therapeutic last 2(+) visits  Medication, diet, health changes since last INR chart reviewed; none identified         PLAN     Recommended plan for no diet, medication or health factor changes affecting INR     Dosing Instructions: Continue your current warfarin dose with next INR in 6 weeks       Summary  As of 1/6/2025      Full warfarin instructions:  6 mg every Wed; 4 mg all other days   Next INR check:  2/17/2025               Detailed voice message left for Stacey with dosing instructions and follow up date.     Contact [phone] to schedule and with any changes, questions or concerns.     Education provided: Please call back if any changes to your diet, medications or how you've been taking warfarin    Plan made per [facility] anticoagulation protocol    Macrina Goodson RN  1/6/2025  Anticoagulation Clinic  [employer] for routing messages: lisa [facility] patient phone line: [phone]        _______________________________________________________________________     Anticoagulation Episode Summary       Current INR goal:  2.0-3.0   TTR:  94.9% (1 y)   Target end date:  Indefinite   Send INR reminders to:  [facility]    Indications    Long term current use of anticoagulant therapy [Z79.01]  Prothrombin gene mutation (H) [D68.52]             Comments:  --             Anticoagulation Care Providers       Provider Role Specialty Phone number    Danny Clark MD Referring Hematology [phone]    Norman Albert PA-C Referring Hematology [phone]

## 2025-01-10 PROBLEM — R87.810 CERVICAL HIGH RISK HPV (HUMAN PAPILLOMAVIRUS) TEST POSITIVE: Status: ACTIVE | Noted: 2024-01-24

## 2025-01-13 ENCOUNTER — HOSPITAL ENCOUNTER (OUTPATIENT)
Dept: MAMMOGRAPHY | Facility: CLINIC | Age: 45
Discharge: HOME | End: 2025-01-13
Attending: STUDENT IN AN ORGANIZED HEALTH CARE EDUCATION/TRAINING PROGRAM | Admitting: STUDENT IN AN ORGANIZED HEALTH CARE EDUCATION/TRAINING PROGRAM
Payer: COMMERCIAL

## 2025-01-13 DIAGNOSIS — Z12.31 VISIT FOR SCREENING MAMMOGRAM: ICD-10-CM

## 2025-01-13 PROCEDURE — 77063 BREAST TOMOSYNTHESIS BI: CPT

## 2025-01-13 PROCEDURE — 77067 SCR MAMMO BI INCL CAD: CPT

## 2025-01-23 DIAGNOSIS — I10 BENIGN ESSENTIAL HYPERTENSION: ICD-10-CM

## 2025-01-23 RX ORDER — LOSARTAN POTASSIUM 25 MG/1
25 TABLET ORAL DAILY
Qty: 90 TABLET | Refills: 0 | Status: SHIPPED | OUTPATIENT
Start: 2025-01-23

## 2025-01-23 RX ORDER — LOSARTAN POTASSIUM 25 MG/1
25 TABLET ORAL DAILY
Qty: 90 TABLET | Refills: 0 | OUTPATIENT
Start: 2025-01-23

## 2025-01-24 NOTE — PROGRESS NOTES
[facility]  [facility]  [facility], [address], [place] [address]  Main: [phone], Fax: [phone]    Video Virtual Visit Note:    Patient: Stacey Maki  MRN: [id number]  : 1980  MARTA: 2025  Location of the patient when this video visit is conducted: Patient's work office.   Location of this writer at the time of this video visit is conducted: [facility].     Due to the ongoing COVID-19 outbreak, this visit was conducted by video, with the patient's approval.    Reason of today's visit:  History of cerebral venous sinus thrombosis on long term anticoagulation therapy. Here for her routine annual follow up clinic visit.      Clinical History Summary:  Stacey Maki is a 44-year-old woman with a history of cerebral venous sinus thrombosis, currently on indefinite anticoagulation therapy with Coumadin, participates in today's scheduled video visit for her routine annual follow up visit. She was last seen by this writer back in 2024.     Thrombosis History Summary:  Estrogen provoked cerebral venous sinus thrombosis in . Found to have heterozygous prothrombin gene mutation.   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 Follow[facility]. According to Dr. Barragan's note on 2013, she has chronic left sigmoid sinus occlusive and occlusion in the upper part of the left internal jugular vein. Additionally, there is a superficial vein that is now draining this sinus. She dose have flow through the right sigmoid sinus, but there are areas of stenosis and irregularity, including an area of stenosis in the high right jugular vein.  She has been on indefinite anticoagulation therapy with warfarin since diagnosis of cerebral sinous thrombosis  with INR goal range of 2.0-3.0.   2/2/2023, we did discuss about transitioning her to rivaroxaban but the patient ultimately decided on staying on warfarin.   1/8/2024, she underwent screening colonoscopy and did need polypectomy. Held warfarin x 5 days prior without pre or post procedure enoxaparin bridging as instructed by Gifty Brownlee PA-C of this clinic. No complications.      Interim History:  Currently she is on warfarin with her goal INR of 2.0-3.0. Stacey reports that she has been doing well on warfarin and is very content. She denies any bleeding issues. No surgery or invasive procedures in the past year. She is not anticipating any invasive or surgical procedures in this upcoming year.     Stacey reports that she recently is placed on Lamisil for toenail fungal infection. Her primary care provider is also considering placing her on cholesterol lowering agent soon.      ROS:  Denies any bleeding complications. Specifically, no frequent epistaxis. No issues with oral mucosal bleeding. Denies any hematuria or blood in stools. Denies any shortness of breath. No chest pain. No cough. No fever.    Medications:   Current Outpatient Medications   Medication Sig Dispense Refill    ciclopirox (PENLAC) 8 % external solution Apply to adjacent skin and affected nails daily.  Remove with alcohol every 7 days, then repeat. 6.6 mL 11    losartan (COZAAR) 25 MG tablet TAKE 1 TABLET(25 MG) BY MOUTH DAILY 90 tablet 0    norethindrone (MICRONOR) 0.35 MG tablet Take 1 tablet (0.35 mg) by mouth daily 84 tablet 4    warfarin ANTICOAGULANT (COUMADIN) 2 MG tablet TAKE 2 TO 3 TABLETS BY MOUTH EVERY DAY AS DIRECTED BY COAGULATION CLINIC 252 tablet 1     No current facility-administered medications for this visit.       Allergies:   Allergies   Allergen Reactions    Benzoyl Peroxide Swelling     Swollen eyelids    Adhesive Tape Dermatitis    Seasonal Allergies Other (See Comments)     Runny nose, itchy eyes, breathing issues, and  fatigue        PMH:   Past Medical History:   Diagnosis Date    Arteriovenous malformation     Asthma     Chronic cerebral venous sinus thrombosis     diagnosed in 2007, 2nd 2009 5wk preg.     Heart disease     mother    Hypertension 2016    mother    Idiopathic intracranial hypertension     Papilledema     Prothrombin gene mutation     heterozygous type; on warfarin (clotting disorder)    Tension headache        Social History:   Deferred    Family History:  Deferred    Objective:  Visual Examination via Video:  Pleasant in no acute distress.  Normal work of breathing   A+O x 3    Labs:  Component      Latest Ref Rng 1/26/2024  4:43 PM 3/28/2024  4:57 PM 5/29/2024  4:55 PM 7/19/2024  12:39 AM 9/12/2024  10:39 AM 11/7/2024  3:28 PM 1/6/2025  2:43 PM   INR      0.85 - 1.15  2.48 (H)  2.66 (H)  3.02 (H)  2.27 (H)  2.44 (H)  2.82 (H)  2.11 (H)       Assessment:  In summary, Stacey is a 44 year old female with a history of cerebral sinus thrombosis back in 2007 that was estrogen provoked who has been on indefinite anticoagulation therapy with Coumadin after she was found to have abnormal cerebral venous flow, participates in today's scheduled video visit for her routine annual follow up visit in regard to chronic anticoagulation therapy use.      Stacey has remain on Coumadin as her mainstay of indefinite anticoagulation therapy with her goal INR of 2.0-3.0. She has done very well with this and has no issues with bleeding complications with her INR percentage within therapeutic range being 100% for the past year. Historically, she has not been interested in switching to any of the direct oral anticoagulant agents even after she agreed to transition to rivaroxaban back in Jan 2023 but later decided to stay on warfarin.      Diagnosis:  History of Cerebral Sinus Thrombosis.  Heterozygous Prothrombin Gene Mutation.  On chronic anticoagulation therapy with Coumadin.     Plan:  No change in regard to her anticoagulation therapy.  She remains to be a good candidate to stay on indefinite anticoagulation therapy with warfarin with her goal INR of 2.0-3.0.     I have advised that she call the anticoagulation management clinic and inform them about her recent addition of Lamisil on her medication list. Also to inform them about the potential addition of a cholesterol lowering agent. They will direct the patient about timing of INR monitoring based on these newly added medications.     She is instructed to call our clinic if she should experience any unusual bleeding issues or if she should need any invasive procedures or surgical procedures in the future. Otherwise, I will plan to see her back in one year for follow up. Virtual or in-person visit is fine.    Video-Visit Details:  Type of service:  Video Visit  Video Start Time:  10:00  Video End Time (time video stopped): 10:15  Originating Location (pt. Location): Patient's work office  Distant Location (provider location):  [facility]   Mode of Communication:  Video Conference via [employer]      Norman Albert PA-C, MPAS  Physician Assistant  [facility].     The longitudinal plan of care for these conditions below were addressed during this visit. Due to the added complexity in care, I will continue to support Stacey Maki  in the subsequent management of these conditions and with the ongoing continuity of care of these conditions.    Problem List Items Addressed This Visit as of 2/19/2024   History of Cerebral Sinus Thrombosis.  Heterozygous Prothrombin Gene Mutation.  On chronic anticoagulation therapy with Coumadin.     20 minutes spent by me on the date of the encounter doing chart review, history and exam, documentation and further activities per the note

## 2025-01-30 ENCOUNTER — OFFICE VISIT (OUTPATIENT)
Dept: DERMATOLOGY | Facility: CLINIC | Age: 45
End: 2025-01-30
Payer: COMMERCIAL

## 2025-01-30 DIAGNOSIS — B35.1 ONYCHOMYCOSIS: Primary | ICD-10-CM

## 2025-01-30 DIAGNOSIS — Z51.81 MEDICATION MONITORING ENCOUNTER: ICD-10-CM

## 2025-01-30 DIAGNOSIS — L82.1 SEBORRHEIC KERATOSES: ICD-10-CM

## 2025-01-30 LAB
ALT SERPL W P-5'-P-CCNC: 12 U/L (ref 0–50)
AST SERPL W P-5'-P-CCNC: 21 U/L (ref 0–45)

## 2025-01-30 PROCEDURE — 84460 ALANINE AMINO (ALT) (SGPT): CPT | Performed by: PHYSICIAN ASSISTANT

## 2025-01-30 PROCEDURE — 84450 TRANSFERASE (AST) (SGOT): CPT | Performed by: PHYSICIAN ASSISTANT

## 2025-01-30 PROCEDURE — 36415 COLL VENOUS BLD VENIPUNCTURE: CPT | Performed by: PHYSICIAN ASSISTANT

## 2025-01-30 PROCEDURE — 99214 OFFICE O/P EST MOD 30 MIN: CPT | Performed by: PHYSICIAN ASSISTANT

## 2025-01-30 RX ORDER — TERBINAFINE HYDROCHLORIDE 250 MG/1
250 TABLET ORAL DAILY
Qty: 90 TABLET | Refills: 0 | Status: SHIPPED | OUTPATIENT
Start: 2025-01-30 | End: 2025-04-30

## 2025-01-30 NOTE — PATIENT INSTRUCTIONS
Proper skin care from [facility]:    -Eliminate harsh soaps as they strip the natural oils from the skin, often resulting in dry itchy skin ( i.e. Dial, Zest, Chinese Spring)  -Use mild soaps such as Cetaphil or Dove Sensitive Skin in the shower. You do not need to use soap on arms, legs, and trunk every time you shower unless visibly soiled.   -Avoid hot or cold showers.  -After showering, lightly dry off and apply moisturizing within 2-3 minutes. This will help trap moisture in the skin.   -Aggressive use of a moisturizer at least 1-2 times a day to the entire body (including -Vanicream, Cetaphil, Aquaphor or Cerave) and moisturize hands after every washing.  -We recommend using moisturizers that come in a tub that needs to be scooped out, not a pump. This has more of an oil base. It will hold moisture in your skin much better than a water base moisturizer. The above recommended are non-pore clogging.      Wear a sunscreen with at least SPF 30 on your face, ears, neck and V of the chest daily. Wear sunscreen on other areas of the body if those areas are exposed to the sun throughout the day. Sunscreens can contain physical and/or chemical blockers. Physical blockers are less likely to clog pores, these include zinc oxide and titanium dioxide. Reapply every two hour and after swimming.     Sunscreen examples: https://www.ewg.org/sunscreen/    UV radiation  UVA radiation remains constant throughout the day and throughout the year. It is a longer wavelength than UVB and therefore penetrates deeper into the skin leading to immediate and delayed tanning, photoaging, and skin cancer. 70-80% of UVA and UVB radiation occurs between the hours of 10am-2pm.  UVB radiation  UVB radiation causes the most harmful effects and is more significant during the summer months. However, snow and ice can reflect UVB radiation leading to skin damage during the winter months as well. UVB radiation is responsible for tanning,  burning, inflammation, delayed erythema (pinkness), pigmentation (brown spots), and skin cancer.     I recommend self monthly full body exams and yearly full body exams with a dermatology provider. If you develop a new or changing lesion please follow up for examination. Most skin cancers are pink and scaly or pink and pearly. However, we do see blue/brown/black skin cancers.  Consider the ABCDEs of melanoma when giving yourself your monthly full body exam ( don't forget the groin, buttocks, feet, toes, etc). A-asymmetry, B-borders, C-color, D-diameter, E-elevation or evolving. If you see any of these changes please follow up in clinic. If you cannot see your back I recommend purchasing a hand held mirror to use with a larger wall mirror.       Checking for Skin Cancer  You can find cancer early by checking your skin each month. There are 3 kinds of skin cancer. They are melanoma, basal cell carcinoma, and squamous cell carcinoma. Doing monthly skin checks is the best way to find new marks or skin changes. Follow the instructions below for checking your skin.   The ABCDEs of checking moles for melanoma   Check your moles or growths for signs of melanoma using ABCDE:   Asymmetry: the sides of the mole or growth don t match  Border: the edges are ragged, notched, or blurred  Color: the color within the mole or growth varies  Diameter: the mole or growth is larger than 6 mm (size of a pencil eraser)  Evolving: the size, shape, or color of the mole or growth is changing (evolving is not shown in the images below)    Checking for other types of skin cancer  Basal cell carcinoma or squamous cell carcinoma have symptoms such as:     A spot or mole that looks different from all other marks on your skin  Changes in how an area feels, such as itching, tenderness, or pain  Changes in the skin's surface, such as oozing, bleeding, or scaliness  A sore that does not heal  New swelling or redness beyond the border of a  mole    Who s at risk?  Anyone can get skin cancer. But you are at greater risk if you have:   Fair skin, light-colored hair, or light-colored eyes  Many moles or abnormal moles on your skin  A history of sunburns from sunlight or tanning beds  A family history of skin cancer  A history of exposure to radiation or chemicals  A weakened immune system  If you have had skin cancer in the past, you are at risk for recurring skin cancer.   How to check your skin  Do your monthly skin checkups in front of a full-length mirror. Check all parts of your body, including your:   Head (ears, face, neck, and scalp)  Torso (front, back, and sides)  Arms (tops, undersides, upper, and lower armpits)  Hands (palms, backs, and fingers, including under the nails)  Buttocks and genitals  Legs (front, back, and sides)  Feet (tops, soles, toes, including under the nails, and between toes)  If you have a lot of moles, take digital photos of them each month. Make sure to take photos both up close and from a distance. These can help you see if any moles change over time.   Most skin changes are not cancer. But if you see any changes in your skin, call your doctor right away. Only he or she can diagnose a problem. If you have skin cancer, seeing your doctor can be the first step toward getting the treatment that could save your life.   [employer] last reviewed this educational content on 4/1/2019 2000-2020 [employer]. [address] [place] [address], [place] [address]. All rights reserved. This information is not intended as a substitute for professional medical care. Always follow your healthcare professional's instructions.       When should I call my doctor?  If you are worsening or not improving, please, contact us or seek urgent care as noted below.     Who should I call with questions (adults)?    [facility] [phone]  For urgent needs outside of business hours call the [facility] at  [phone] and ask for the dermatology resident on call to be paged  If this is a medical emergency and you are unable to reach an ER, Call 911      If you need a prescription refill, please contact your pharmacy. Refills are approved or denied by our Physicians during normal business hours, Monday through Friday.  Per office policy, refills will not be granted if you have not been seen within the past year (or sooner depending on the condition).

## 2025-01-30 NOTE — PROGRESS NOTES
[facility] Dermatology Note  Encounter Date: Jan 30, 2025  Office Visit      Dermatology Problem List:  FBSE 1/30/25    1. Folliculitis - chest  - clindamycin 1% lotion  -previous tx: BPO was  2. Hair loss - androgenetic - 5% minoxidil topically  3. Skin Tags - neck - cryo  4. Hx of DN  -  Junctional dysplastic nevus with mild atypia, R breast, Bx proven on 7/31/23  5. Onychomycosis  - Tx: Terbinafine (Initiated 1/30/25)  - Previous Tx: Penlac   6. Open comedone, Mons pubis, S/p Extraction 2/12/24    PMHx: Prothrombin gene mutation  ____________________________________________    Assessment & Plan:  # Onychomycosis, L 4th toenail , Did not get much relief with Penlac solution.   - stop penlac   - Start terbinafine 250 mg daily x12 weeks for nails.  Patient counseled that nails may take approximately 12 to 18 months for toenails to completely grow out. Repeat course of Lamisil may be necessary.   - The patient was counseled on the rare but serious risk of liver disease. Baseline ALT and AST will be obtained today and repeated in 4 weeks   - Recommended frequent nail cliping, avoiding re-exposure by going barefoot, and discarding old shoes or treating them with an antifungal powder. Recommend breathable footware and socks.    # Sk R breast & R axilla  - Discussed the natural history and benign nature of this lesion. Reassurance provided that no additional treatment is necessary.       Procedures Performed:   None     Follow-up: 4 month(s) in-person, or earlier for new or changing lesions    Staff and scribe:     Scribe Disclosure:   I, JENNIFER MEAD, am serving as a scribe; to document services personally performed by Nayana Esquivel PA-C -based on data collection and the provider's statements to me.     Provider Disclosure:  I agree with above History, Review of Systems, Physical exam and Plan.  I have reviewed the content of the documentation and have edited it as needed. I have personally  performed the services documented here and the documentation accurately represents those services and the decisions I have made.      Electronically signed by:    All risks, benefits and alternatives were discussed with patient.  Patient is in agreement and understands the assessment and plan.  All questions were answered.    Nayana Esquivel PA-C, MPAS  [facility]: Phone: [phone], Fax: [phone]  [facility]: Phone: [phone],  Fax: [phone]  [facility]: Phone: [phone], Fax: [phone]  ____________________________________________    CC: RECHECK (Nail fungus on toe left foot. /Right breast and axilla. /)      Reviewed patients past medical history and pertinent chart review prior to patient's visit today.     HPI:  Ms. Stacey Maki is a 44 year old female who presents today as a return patient for a recheck on her L foot nail fungus. She continues to have to have toe nail fungus. She also reported a spot of concenr on her R breast and R axilla.     Patient is otherwise feeling well, without additional concerns.    Labs:  LFTs ordered today, will repeat in 1mo    Physical Exam:  Vitals: There were no vitals taken for this visit.  SKIN: Focused examination of areas noted below was performed.   - Yellowish discoloration and mild hyperkeratosis on her L 4th toe nail  - waxy, stuck on tan/brown papules and patches on the: x 1 R breast x 1 R axilla   - No other lesions of concern on areas examined.       Medications:  Current Outpatient Medications   Medication Sig Dispense Refill    ciclopirox (PENLAC) 8 % external solution Apply to adjacent skin and affected nails daily.  Remove with alcohol every 7 days, then repeat. 6.6 mL 11    losartan (COZAAR) 25 MG tablet TAKE 1 TABLET(25 MG) BY MOUTH DAILY 90 tablet 0    norethindrone (MICRONOR) 0.35 MG tablet Take 1 tablet (0.35 mg) by mouth daily  84 tablet 4    warfarin ANTICOAGULANT (COUMADIN) 2 MG tablet TAKE 2 TO 3 TABLETS BY MOUTH EVERY DAY AS DIRECTED BY COAGULATION CLINIC 252 tablet 1     No current facility-administered medications for this visit.      Past Medical/Surgical History:   Patient Active Problem List   Diagnosis    Prothrombin gene mutation    Long term current use of anticoagulant therapy    History of cerebral venous sinus thrombosis    Benign essential hypertension    Premenstrual dysphoric disorder    Recurrent major depressive disorder, in full remission    NIKA (generalized anxiety disorder)    CARDIOVASCULAR SCREENING; LDL GOAL LESS THAN 130    Moderate episode of recurrent major depressive disorder (H)    Cervical high risk HPV (human papillomavirus) test positive     Past Medical History:   Diagnosis Date    Arteriovenous malformation     Asthma     Chronic cerebral venous sinus thrombosis     diagnosed in 2007, 2nd 2009 5wk preg.     Heart disease     mother    Hypertension 2016    mother    Idiopathic intracranial hypertension     Papilledema     Prothrombin gene mutation     heterozygous type; on warfarin (clotting disorder)    Tension headache

## 2025-01-30 NOTE — LETTER
1/30/2025      Stacey LUISA Glynn  [address] [place] [address]      Dear Colleague,    Thank you for referring your patient, Stacey Maki, [facility]. Please see a copy of my visit note below.    [facility] Dermatology Note  Encounter Date: Jan 30, 2025  Office Visit      Dermatology Problem List:  FBSE 1/30/25    1. Folliculitis - chest  - clindamycin 1% lotion  -previous tx: BPO was  2. Hair loss - androgenetic - 5% minoxidil topically  3. Skin Tags - neck - cryo  4. Hx of DN  -  Junctional dysplastic nevus with mild atypia, R breast, Bx proven on 7/31/23  5. Onychomycosis  - Tx: Terbinafine (Initiated 1/30/25)  - Previous Tx: Penlac   6. Open comedone, Mons pubis, S/p Extraction 2/12/24    PMHx: Prothrombin gene mutation  ____________________________________________    Assessment & Plan:  # Onychomycosis, L 4th toenail , Did not get much relief with Penlac solution.   - stop penlac   - Start terbinafine 250 mg daily x12 weeks for nails.  Patient counseled that nails may take approximately 12 to 18 months for toenails to completely grow out. Repeat course of Lamisil may be necessary.   - The patient was counseled on the rare but serious risk of liver disease. Baseline ALT and AST will be obtained today and repeated in 4 weeks   - Recommended frequent nail cliping, avoiding re-exposure by going barefoot, and discarding old shoes or treating them with an antifungal powder. Recommend breathable footware and socks.    # Sk R breast & R axilla  - Discussed the natural history and benign nature of this lesion. Reassurance provided that no additional treatment is necessary.       Procedures Performed:   None     Follow-up: 4 month(s) in-person, or earlier for new or changing lesions    Staff and scribe:     Scribe Disclosure:   I, JENNIFER MEAD, am serving as a scribe; to document services personally performed by Nayana Esquivel PA-C -based on data  collection and the provider's statements to me.     Provider Disclosure:  I agree with above History, Review of Systems, Physical exam and Plan.  I have reviewed the content of the documentation and have edited it as needed. I have personally performed the services documented here and the documentation accurately represents those services and the decisions I have made.      Electronically signed by:    All risks, benefits and alternatives were discussed with patient.  Patient is in agreement and understands the assessment and plan.  All questions were answered.    Nayana Esquivel PA-C, MPAS  [facility]: Phone: [phone], Fax: [phone]  [facility]: Phone: [phone],  Fax: [phone]  [facility]: Phone: [phone], Fax: [phone]  ____________________________________________    CC: RECHECK (Nail fungus on toe left foot. /Right breast and axilla. /)      Reviewed patients past medical history and pertinent chart review prior to patient's visit today.     HPI:  Ms. Stacey aMki is a 44 year old female who presents today as a return patient for a recheck on her L foot nail fungus. She continues to have to have toe nail fungus. She also reported a spot of concenr on her R breast and R axilla.     Patient is otherwise feeling well, without additional concerns.    Labs:  LFTs ordered today, will repeat in 1mo    Physical Exam:  Vitals: There were no vitals taken for this visit.  SKIN: Focused examination of areas noted below was performed.   - Yellowish discoloration and mild hyperkeratosis on her L 4th toe nail  - waxy, stuck on tan/brown papules and patches on the: x 1 R breast x 1 R axilla   - No other lesions of concern on areas examined.       Medications:  Current Outpatient Medications   Medication Sig Dispense Refill     ciclopirox (PENLAC) 8 % external solution Apply to adjacent skin and  affected nails daily.  Remove with alcohol every 7 days, then repeat. 6.6 mL 11     losartan (COZAAR) 25 MG tablet TAKE 1 TABLET(25 MG) BY MOUTH DAILY 90 tablet 0     norethindrone (MICRONOR) 0.35 MG tablet Take 1 tablet (0.35 mg) by mouth daily 84 tablet 4     warfarin ANTICOAGULANT (COUMADIN) 2 MG tablet TAKE 2 TO 3 TABLETS BY MOUTH EVERY DAY AS DIRECTED BY COAGULATION CLINIC 252 tablet 1     No current facility-administered medications for this visit.      Past Medical/Surgical History:   Patient Active Problem List   Diagnosis     Prothrombin gene mutation     Long term current use of anticoagulant therapy     History of cerebral venous sinus thrombosis     Benign essential hypertension     Premenstrual dysphoric disorder     Recurrent major depressive disorder, in full remission     NIKA (generalized anxiety disorder)     CARDIOVASCULAR SCREENING; LDL GOAL LESS THAN 130     Moderate episode of recurrent major depressive disorder (H)     Cervical high risk HPV (human papillomavirus) test positive     Past Medical History:   Diagnosis Date     Arteriovenous malformation      Asthma      Chronic cerebral venous sinus thrombosis     diagnosed in 2007, 2nd 2009 5wk preg.      Heart disease     mother     Hypertension 2016    mother     Idiopathic intracranial hypertension      Papilledema      Prothrombin gene mutation     heterozygous type; on warfarin (clotting disorder)     Tension headache                         Again, thank you for allowing me to participate in the care of your patient.        Sincerely,        Nayana Esquivel PA-C    Electronically signed

## 2025-01-31 PROBLEM — F33.1 MODERATE EPISODE OF RECURRENT MAJOR DEPRESSIVE DISORDER (H): Status: RESOLVED | Noted: 2023-11-28 | Resolved: 2025-01-31

## 2025-01-31 PROBLEM — I74.9 EMBOLISM AND THROMBOSIS (H): Status: ACTIVE | Noted: 2025-01-31

## 2025-02-02 ENCOUNTER — LAB (OUTPATIENT)
Dept: LAB | Facility: CLINIC | Age: 45
End: 2025-02-02
Payer: COMMERCIAL

## 2025-02-02 DIAGNOSIS — Z00.00 LABORATORY EXAMINATION ORDERED AS PART OF A ROUTINE GENERAL MEDICAL EXAMINATION: ICD-10-CM

## 2025-02-02 DIAGNOSIS — I10 BENIGN ESSENTIAL HYPERTENSION: ICD-10-CM

## 2025-02-02 LAB
ALBUMIN SERPL BCG-MCNC: 4.5 G/DL (ref 3.5–5.2)
ALP SERPL-CCNC: 26 U/L (ref 40–150)
ALT SERPL W P-5'-P-CCNC: 13 U/L (ref 0–50)
ANION GAP SERPL CALCULATED.3IONS-SCNC: 12 MMOL/L (ref 7–15)
AST SERPL W P-5'-P-CCNC: 17 U/L (ref 0–45)
BASOPHILS # BLD AUTO: 0 10E3/UL (ref 0–0.2)
BASOPHILS NFR BLD AUTO: 0 %
BILIRUB SERPL-MCNC: 0.5 MG/DL
BUN SERPL-MCNC: 8.8 MG/DL (ref 6–20)
CALCIUM SERPL-MCNC: 9.6 MG/DL (ref 8.8–10.4)
CHLORIDE SERPL-SCNC: 102 MMOL/L (ref 98–107)
CHOLEST SERPL-MCNC: 231 MG/DL
CREAT SERPL-MCNC: 0.84 MG/DL (ref 0.51–0.95)
EGFRCR SERPLBLD CKD-EPI 2021: 86 ML/MIN/1.73M2
EOSINOPHIL # BLD AUTO: 0.1 10E3/UL (ref 0–0.7)
EOSINOPHIL NFR BLD AUTO: 3 %
ERYTHROCYTE [DISTWIDTH] IN BLOOD BY AUTOMATED COUNT: 12 % (ref 10–15)
FASTING STATUS PATIENT QL REPORTED: YES
GLUCOSE SERPL-MCNC: 90 MG/DL (ref 70–99)
HCO3 SERPL-SCNC: 24 MMOL/L (ref 22–29)
HCT VFR BLD AUTO: 43.9 % (ref 35–47)
HDLC SERPL-MCNC: 57 MG/DL
HGB BLD-MCNC: 14.9 G/DL (ref 11.7–15.7)
IMM GRANULOCYTES # BLD: 0 10E3/UL
IMM GRANULOCYTES NFR BLD: 0 %
LDLC SERPL CALC-MCNC: 163 MG/DL
LYMPHOCYTES # BLD AUTO: 2.2 10E3/UL (ref 0.8–5.3)
LYMPHOCYTES NFR BLD AUTO: 45 %
MCH RBC QN AUTO: 30.5 PG (ref 26.5–33)
MCHC RBC AUTO-ENTMCNC: 33.9 G/DL (ref 31.5–36.5)
MCV RBC AUTO: 90 FL (ref 78–100)
MONOCYTES # BLD AUTO: 0.3 10E3/UL (ref 0–1.3)
MONOCYTES NFR BLD AUTO: 7 %
NEUTROPHILS # BLD AUTO: 2.2 10E3/UL (ref 1.6–8.3)
NEUTROPHILS NFR BLD AUTO: 45 %
NONHDLC SERPL-MCNC: 174 MG/DL
NRBC # BLD AUTO: 0 10E3/UL
NRBC BLD AUTO-RTO: 0 /100
PLATELET # BLD AUTO: 169 10E3/UL (ref 150–450)
POTASSIUM SERPL-SCNC: 4.5 MMOL/L (ref 3.4–5.3)
PROT SERPL-MCNC: 7.3 G/DL (ref 6.4–8.3)
RBC # BLD AUTO: 4.89 10E6/UL (ref 3.8–5.2)
SODIUM SERPL-SCNC: 138 MMOL/L (ref 135–145)
TRIGL SERPL-MCNC: 55 MG/DL
TSH SERPL DL<=0.005 MIU/L-ACNC: 1.82 UIU/ML (ref 0.3–4.2)
WBC # BLD AUTO: 4.8 10E3/UL (ref 4–11)

## 2025-02-02 PROCEDURE — 85025 COMPLETE CBC W/AUTO DIFF WBC: CPT

## 2025-02-02 PROCEDURE — 84443 ASSAY THYROID STIM HORMONE: CPT

## 2025-02-02 PROCEDURE — 80053 COMPREHEN METABOLIC PANEL: CPT

## 2025-02-02 PROCEDURE — 82465 ASSAY BLD/SERUM CHOLESTEROL: CPT

## 2025-02-02 PROCEDURE — 36415 COLL VENOUS BLD VENIPUNCTURE: CPT

## 2025-02-04 ENCOUNTER — VIRTUAL VISIT (OUTPATIENT)
Dept: HEMATOLOGY | Facility: CLINIC | Age: 45
End: 2025-02-04
Attending: PHYSICIAN ASSISTANT
Payer: COMMERCIAL

## 2025-02-04 DIAGNOSIS — Z79.01 LONG TERM CURRENT USE OF ANTICOAGULANT THERAPY: ICD-10-CM

## 2025-02-04 DIAGNOSIS — I74.9 EMBOLISM AND THROMBOSIS (H): ICD-10-CM

## 2025-02-04 DIAGNOSIS — Z86.718 HISTORY OF CEREBRAL VENOUS SINUS THROMBOSIS: Primary | ICD-10-CM

## 2025-02-04 DIAGNOSIS — D68.52 PROTHROMBIN GENE MUTATION: ICD-10-CM

## 2025-02-04 PROCEDURE — 98005 SYNCH AUDIO-VIDEO EST LOW 20: CPT | Performed by: PHYSICIAN ASSISTANT

## 2025-02-04 NOTE — PATIENT INSTRUCTIONS
Stacey,    It was nice to see you via video visit earlier today.    Below is a summary of our plan:  Please continue to take your warfarin with dosing as directed by your anticoagulation monitoring clinic staffs.   Please contact your anticoagulation monitoring clinic staff and inform them about your addition of Lamisil to your medication and the potential of adding cholesterol lowering agent.   If you should have any further questions, or experience any unusual bleeding issues or if you should need any invasive or surgical procedures in the future, please call us at [phone] and ask to speak to a nursing staff.  One of our administrative assistants will contact you to schedule your return visit with me in one year. Virtual or in-person visit is fine.     Thank you once again in choosing our clinic as part of your healthcare team.      Norman Albert PA-C, MPAS  Physician Assistant  [facility].

## 2025-02-06 ENCOUNTER — PATIENT OUTREACH (OUTPATIENT)
Dept: OBGYN | Facility: CLINIC | Age: 45
End: 2025-02-06

## 2025-02-06 ENCOUNTER — OFFICE VISIT (OUTPATIENT)
Dept: INTERNAL MEDICINE | Facility: CLINIC | Age: 45
End: 2025-02-06
Payer: COMMERCIAL

## 2025-02-06 VITALS
HEIGHT: 63 IN | HEART RATE: 86 BPM | OXYGEN SATURATION: 99 % | WEIGHT: 146 LBS | RESPIRATION RATE: 19 BRPM | TEMPERATURE: 96.9 F | SYSTOLIC BLOOD PRESSURE: 118 MMHG | DIASTOLIC BLOOD PRESSURE: 78 MMHG | BODY MASS INDEX: 25.87 KG/M2

## 2025-02-06 DIAGNOSIS — E78.00 HYPERCHOLESTEREMIA: ICD-10-CM

## 2025-02-06 DIAGNOSIS — Z00.00 ROUTINE GENERAL MEDICAL EXAMINATION AT A HEALTH CARE FACILITY: Primary | ICD-10-CM

## 2025-02-06 DIAGNOSIS — I10 BENIGN ESSENTIAL HYPERTENSION: ICD-10-CM

## 2025-02-06 RX ORDER — LOSARTAN POTASSIUM 25 MG/1
25 TABLET ORAL DAILY
Qty: 90 TABLET | Refills: 3 | Status: SHIPPED | OUTPATIENT
Start: 2025-02-06

## 2025-02-06 NOTE — PROGRESS NOTES
"Preventive Care Visit  [facility]  JORDAN Dominguez CNP, Internal Medicine  Feb 6, 2025      Assessment & Plan     Routine general medical examination at a health care facility      Benign essential hypertension  In good range   - losartan (COZAAR) 25 MG tablet; Take 1 tablet (25 mg) by mouth daily.    Hypercholesteremia  Want to try plant sterols   - Lipid panel reflex to direct LDL Fasting; Future          BMI  Estimated body mass index is 25.86 kg/m  as calculated from the following:    Height as of this encounter: 1.6 m (5' 3\").    Weight as of this encounter: 66.2 kg (146 lb).         Patient Instructions   Medication refilled     Keshia Ibarra is a 44 year old, presenting for the following:  Physical        2/6/2025     1:35 PM   Additional Questions   Roomed by SENDY Jefferson LPN   Accompanied by self         2/6/2025     1:35 PM   Patient Reported Additional Medications   Patient reports taking the following new medications none          History of Present Illness       Hyperlipidemia:  She presents for follow up of hyperlipidemia.   She is not taking medication to lower cholesterol. She is not having myalgia or other side effects to statin medications.    Hypertension: She presents for follow up of hypertension.  She does check blood pressure  regularly outside of the clinic. Outside blood pressures have been over 140/90. She does not follow a low salt diet.     She eats 2-3 servings of fruits and vegetables daily.She consumes 1 sweetened beverage(s) daily.She exercises with enough effort to increase her heart rate 9 or less minutes per day.  She exercises with enough effort to increase her heart rate 3 or less days per week.   She is taking medications regularly.    Works at [employer] at [employer]     Plant sterols helped in past with lowering cholesterol     Started antifungal medication- lamisil for 12 week   Started Sunday     Weaned off lexapro     Lost 10 pounds "   Blood pressure in good range     Health Care Directive  Patient does not have a Health Care Directive: Discussed advance care planning with patient; information given to patient to review.       No data to display                   No data to display                   No data to display                  11/28/2023   Social Factors   Worry food won't last until get money to buy more No   Food not last or not have enough money for food? No   Do you have housing? (Housing is defined as stable permanent housing and does not include staying ouside in a car, in a tent, in an abandoned building, in an overnight shelter, or couch-surfing.) Yes   Are you worried about losing your housing? No   Lack of transportation? No   Unable to get utilities (heat,electricity)? No          No data to display                              No data to display              Social History     Tobacco Use    Smoking status: Never    Smokeless tobacco: Never   Vaping Use    Vaping status: Never Used   Substance Use Topics    Alcohol use: Yes     Comment: 2-4 drinks per month    Drug use: No           1/13/2025   LAST FHS-7 RESULTS   1st degree relative breast or ovarian cancer Yes    Yes   Any relative bilateral breast cancer No    No   Any male have breast cancer No    No   Any ONE woman have BOTH breast AND ovarian cancer No    No   Any woman with breast cancer before 50yrs No    No   2 or more relatives with breast AND/OR ovarian cancer No    No   2 or more relatives with breast AND/OR bowel cancer Yes    Yes       Multiple values from one day are sorted in reverse-chronological order              History of abnormal Pap smear:         Latest Ref Rng & Units 1/31/2025    10:08 AM 1/24/2024     2:35 PM 11/1/2019    11:40 AM   PAP / HPV   PAP  Negative for Intraepithelial Lesion or Malignancy (NILM)  Negative for Intraepithelial Lesion or Malignancy (NILM)     PAP (Historical)    NIL    HPV 16 DNA Negative Negative  Negative     HPV 18 DNA  "Negative Negative  Negative     Other HR HPV Negative Negative  Positive       ASCVD Risk   The ASCVD Risk score (Dang CORDERO, et al., 2019) failed to calculate for the following reasons:    Risk score cannot be calculated because patient has a medical history suggesting prior/existing ASCVD         No data to display                 Reviewed and updated as needed this visit by Provider                    Labs reviewed in EPIC      Review of Systems  Constitutional, neuro, ENT, endocrine, pulmonary, cardiac, gastrointestinal, genitourinary, musculoskeletal, integument and psychiatric systems are negative, except as otherwise noted.     Objective    Exam  /78 (BP Location: Left arm)   Pulse 86   Temp 96.9  F (36.1  C) (Oral)   Resp 19   Ht 1.6 m (5' 3\")   Wt 66.2 kg (146 lb)   LMP 11/30/2024   SpO2 99%   Breastfeeding No   BMI 25.86 kg/m     Estimated body mass index is 25.86 kg/m  as calculated from the following:    Height as of this encounter: 1.6 m (5' 3\").    Weight as of this encounter: 66.2 kg (146 lb).    Physical Exam  GENERAL: alert and no distress  EYES: Eyes grossly normal to inspection,  and conjunctivae and sclerae normal  RESP: lungs clear to auscultation - no rales, rhonchi or wheezes  CV: regular rate and rhythm, normal S1 S2, no S3 or S4, no murmur, click or rub, no peripheral edema  ABDOMEN: soft, nontender,and bowel sounds normal  MS: no gross musculoskeletal defects noted, no edema  SKIN: no suspicious lesions or rashes  NEURO: Normal strength and tone, mentation intact and speech normal  PSYCH: mentation appears normal, affect normal/bright        Signed Electronically by: JORDAN Dominguez CNP    "

## 2025-02-25 ENCOUNTER — MYC MEDICAL ADVICE (OUTPATIENT)
Dept: ANTICOAGULATION | Facility: CLINIC | Age: 45
End: 2025-02-25
Payer: COMMERCIAL

## 2025-02-27 ENCOUNTER — ANTICOAGULATION THERAPY VISIT (OUTPATIENT)
Dept: ANTICOAGULATION | Facility: CLINIC | Age: 45
End: 2025-02-27

## 2025-02-27 ENCOUNTER — LAB (OUTPATIENT)
Dept: LAB | Facility: CLINIC | Age: 45
End: 2025-02-27
Payer: COMMERCIAL

## 2025-02-27 DIAGNOSIS — Z51.81 MEDICATION MONITORING ENCOUNTER: ICD-10-CM

## 2025-02-27 DIAGNOSIS — D68.52 PROTHROMBIN GENE MUTATION: ICD-10-CM

## 2025-02-27 DIAGNOSIS — Z79.01 LONG TERM CURRENT USE OF ANTICOAGULANT THERAPY: Primary | ICD-10-CM

## 2025-02-27 DIAGNOSIS — Z79.01 LONG TERM CURRENT USE OF ANTICOAGULANT THERAPY: ICD-10-CM

## 2025-02-27 LAB
ALT SERPL W P-5'-P-CCNC: 12 U/L (ref 0–50)
AST SERPL W P-5'-P-CCNC: 16 U/L (ref 0–45)
INR PPP: 2.83 (ref 0.85–1.15)

## 2025-02-27 PROCEDURE — 85610 PROTHROMBIN TIME: CPT

## 2025-02-27 PROCEDURE — 36415 COLL VENOUS BLD VENIPUNCTURE: CPT

## 2025-02-27 PROCEDURE — 84460 ALANINE AMINO (ALT) (SGPT): CPT

## 2025-02-27 PROCEDURE — 84450 TRANSFERASE (AST) (SGOT): CPT

## 2025-02-27 NOTE — PROGRESS NOTES
ANTICOAGULATION MANAGEMENT     Stacey Maki 44 year old female is on warfarin with therapeutic INR result. (Goal INR 2.0-3.0)    Recent labs: (last 7 days)     02/27/25  1541   INR 2.83*       ASSESSMENT     Source(s): Chart Review  Previous INR was Therapeutic last 2(+) visits  Medication, diet, health changes since last INR chart reviewed; none identified  I left a detailed voicemail with the orders reflected in flowsheet. I have also requested a call back if there have been any missed doses, concerns, illness, fever, or if there have been any changes in medications, activity level, or diet      [employer] also sent        PLAN     Recommended plan for no diet, medication or health factor changes affecting INR     Dosing Instructions: Continue your current warfarin dose with next INR in 6 weeks       Summary  As of 2/27/2025      Full warfarin instructions:  6 mg every Wed; 4 mg all other days   Next INR check:  4/10/2025               Detailed voice message left for Stacey with dosing instructions and follow up date.   Sent [employer] message with dosing and follow up instructions    Contact [phone] to schedule and with any changes, questions or concerns.     Education provided: Please call back if any changes to your diet, medications or how you've been taking warfarin    Plan made per [facility] anticoagulation protocol    Enedina Avilez, RN  2/27/2025  Anticoagulation Clinic  [facility] for routing messages: lisa [facility] patient phone line: [phone]        _______________________________________________________________________     Anticoagulation Episode Summary       Current INR goal:  2.0-3.0   TTR:  98.7% (1 y)   Target end date:  Indefinite   Send INR reminders to:  [facility]    Indications    Long term current use of anticoagulant therapy [Z79.01]  Prothrombin gene mutation [D68.52]             Comments:  --             Anticoagulation Care Providers       Provider Role Specialty Phone  number    Danny Clark MD Referring Hematology [phone]    Norman Albert PA-C Referring Hematology [phone]

## 2025-04-15 ENCOUNTER — MYC REFILL (OUTPATIENT)
Dept: OBGYN | Facility: CLINIC | Age: 45
End: 2025-04-15
Payer: COMMERCIAL

## 2025-04-15 DIAGNOSIS — F32.81 PREMENSTRUAL DYSPHORIC DISORDER: ICD-10-CM

## 2025-04-15 RX ORDER — ACETAMINOPHEN AND CODEINE PHOSPHATE 120; 12 MG/5ML; MG/5ML
0.35 SOLUTION ORAL DAILY
Qty: 84 TABLET | Refills: 4 | Status: CANCELLED | OUTPATIENT
Start: 2025-04-15

## 2025-04-15 NOTE — TELEPHONE ENCOUNTER
This RN called and spoke to Stacey. Let her know there were refills sent in with original seran control prescription. She is going to call them to check if they have the refills and if not she will call us and let us know. This RN verbalized understanding.

## 2025-04-17 ENCOUNTER — TELEPHONE (OUTPATIENT)
Dept: ANTICOAGULATION | Facility: CLINIC | Age: 45
End: 2025-04-17
Payer: COMMERCIAL

## 2025-04-17 NOTE — TELEPHONE ENCOUNTER
ANTICOAGULATION     Stacey Maki is overdue for an INR check.     Left message for patient to call and schedule lab appointment as soon as possible. If returning call, please schedule.     Donte Vo RN  4/17/2025  Anticoagulation Clinic  [facility] for routing messages: lisa [facility]  [facility] patient phone line: [phone]

## 2025-04-19 ENCOUNTER — LAB (OUTPATIENT)
Dept: LAB | Facility: CLINIC | Age: 45
End: 2025-04-19
Payer: COMMERCIAL

## 2025-04-19 DIAGNOSIS — Z79.01 LONG TERM CURRENT USE OF ANTICOAGULANT THERAPY: ICD-10-CM

## 2025-04-19 DIAGNOSIS — D68.52 PROTHROMBIN GENE MUTATION: ICD-10-CM

## 2025-04-19 LAB — INR PPP: 1.89 (ref 0.85–1.15)

## 2025-04-19 PROCEDURE — 36415 COLL VENOUS BLD VENIPUNCTURE: CPT

## 2025-04-19 PROCEDURE — 85610 PROTHROMBIN TIME: CPT

## 2025-04-21 ENCOUNTER — ANTICOAGULATION THERAPY VISIT (OUTPATIENT)
Dept: ANTICOAGULATION | Facility: CLINIC | Age: 45
End: 2025-04-21
Payer: COMMERCIAL

## 2025-04-21 ENCOUNTER — MYC MEDICAL ADVICE (OUTPATIENT)
Dept: ANTICOAGULATION | Facility: CLINIC | Age: 45
End: 2025-04-21
Payer: COMMERCIAL

## 2025-04-21 DIAGNOSIS — Z79.01 LONG TERM CURRENT USE OF ANTICOAGULANT THERAPY: Primary | ICD-10-CM

## 2025-04-21 DIAGNOSIS — D68.52 PROTHROMBIN GENE MUTATION: ICD-10-CM

## 2025-04-21 NOTE — PROGRESS NOTES
ANTICOAGULATION MANAGEMENT     Stacey Maki 45 year old female is on warfarin with subtherapeutic INR result. (Goal INR 2.0-3.0)    Recent labs: (last 7 days)     04/19/25  0933   INR 1.89*       ASSESSMENT     Source(s): Chart Review  Previous INR was Therapeutic last 2(+) visits  Medication, diet, health changes since last INR chart reviewed; none identified         PLAN     Recommended plan for no diet, medication or health factor changes affecting INR     Dosing Instructions: Continue your current warfarin dose with next INR in 2 weeks       Summary  As of 4/21/2025      Full warfarin instructions:  6 mg every Wed; 4 mg all other days   Next INR check:  5/5/2025               Detailed voice message left for Stacey with dosing instructions and follow up date.   Sent [employer] message with dosing and follow up instructions    Contact [phone] to schedule and with any changes, questions or concerns.     Education provided: Please call back if any changes to your diet, medications or how you've been taking warfarin    Plan made per [facility] anticoagulation protocol    Brenna Nguyễn RN  4/21/2025  Anticoagulation Clinic  [employer] for routing messages: lisa [facility] patient phone line: [phone]        _______________________________________________________________________     Anticoagulation Episode Summary       Current INR goal:  2.0-3.0   TTR:  97.0% (1 y)   Target end date:  Indefinite   Send INR reminders to:  [facility]    Indications    Long term current use of anticoagulant therapy [Z79.01]  Prothrombin gene mutation [D68.52]             Comments:  --             Anticoagulation Care Providers       Provider Role Specialty Phone number    Danny Clark MD Referring Hematology [phone]    Norman Albert PA-C Referring Hematology [phone]             2 = A lot of assistance

## 2025-04-28 ENCOUNTER — MYC REFILL (OUTPATIENT)
Dept: INTERNAL MEDICINE | Facility: CLINIC | Age: 45
End: 2025-04-28
Payer: COMMERCIAL

## 2025-04-28 DIAGNOSIS — I10 BENIGN ESSENTIAL HYPERTENSION: ICD-10-CM

## 2025-04-28 RX ORDER — LOSARTAN POTASSIUM 25 MG/1
25 TABLET ORAL DAILY
Qty: 90 TABLET | Refills: 3 | OUTPATIENT
Start: 2025-04-28

## 2025-05-12 ENCOUNTER — MYC MEDICAL ADVICE (OUTPATIENT)
Dept: ANTICOAGULATION | Facility: CLINIC | Age: 45
End: 2025-05-12
Payer: COMMERCIAL

## 2025-05-13 ENCOUNTER — ANTICOAGULATION THERAPY VISIT (OUTPATIENT)
Dept: ANTICOAGULATION | Facility: CLINIC | Age: 45
End: 2025-05-13

## 2025-05-13 ENCOUNTER — RESULTS FOLLOW-UP (OUTPATIENT)
Dept: ANTICOAGULATION | Facility: CLINIC | Age: 45
End: 2025-05-13

## 2025-05-13 ENCOUNTER — LAB (OUTPATIENT)
Dept: LAB | Facility: CLINIC | Age: 45
End: 2025-05-13
Payer: COMMERCIAL

## 2025-05-13 DIAGNOSIS — Z79.01 LONG TERM CURRENT USE OF ANTICOAGULANT THERAPY: Primary | ICD-10-CM

## 2025-05-13 DIAGNOSIS — Z79.01 LONG TERM CURRENT USE OF ANTICOAGULANT THERAPY: ICD-10-CM

## 2025-05-13 DIAGNOSIS — D68.52 PROTHROMBIN GENE MUTATION: ICD-10-CM

## 2025-05-13 LAB
INR PPP: 2.34 (ref 0.85–1.15)
PROTHROMBIN TIME: 25.3 SECONDS (ref 11.8–14.8)

## 2025-05-13 PROCEDURE — 85610 PROTHROMBIN TIME: CPT

## 2025-05-13 PROCEDURE — 36415 COLL VENOUS BLD VENIPUNCTURE: CPT

## 2025-05-13 NOTE — PROGRESS NOTES
ANTICOAGULATION MANAGEMENT     Stacey Maki 45 year old female is on warfarin with therapeutic INR result. (Goal INR 2.0-3.0)    Recent labs: (last 7 days)     05/13/25  1330   INR 2.34*       ASSESSMENT     Source(s): Chart Review  Previous INR was Subtherapeutic  Medication, diet, health changes since last INR chart reviewed; none identified         PLAN     Recommended plan for no diet, medication or health factor changes affecting INR     Dosing Instructions: Continue your current warfarin dose with next INR in 3 weeks       Summary  As of 5/13/2025      Full warfarin instructions:  6 mg every Wed; 4 mg all other days   Next INR check:  6/3/2025               Detailed voice message left for Stacey with dosing instructions and follow up date.   Sent [employer] message with dosing and follow up instructions    Contact [phone] to schedule and with any changes, questions or concerns.     Education provided: Please call back if any changes to your diet, medications or how you've been taking warfarin    Plan made per [facility] anticoagulation protocol    Donte Vo RN  5/13/2025  Anticoagulation Clinic  [employer] for routing messages: lisa [facility] patient phone line: [phone]        _______________________________________________________________________     Anticoagulation Episode Summary       Current INR goal:  2.0-3.0   TTR:  95.5% (1 y)   Target end date:  Indefinite   Send INR reminders to:  [facility]    Indications    Long term current use of anticoagulant therapy [Z79.01]  Prothrombin gene mutation [D68.52]             Comments:  --             Anticoagulation Care Providers       Provider Role Specialty Phone number    Danny Clark MD Referring Hematology [phone]    Norman Albert PA-C Referring Hematology [phone]

## 2025-06-10 ENCOUNTER — MYC MEDICAL ADVICE (OUTPATIENT)
Dept: ANTICOAGULATION | Facility: CLINIC | Age: 45
End: 2025-06-10
Payer: COMMERCIAL

## 2025-06-16 ENCOUNTER — RESULTS FOLLOW-UP (OUTPATIENT)
Dept: ANTICOAGULATION | Facility: CLINIC | Age: 45
End: 2025-06-16

## 2025-06-16 ENCOUNTER — ANTICOAGULATION THERAPY VISIT (OUTPATIENT)
Dept: ANTICOAGULATION | Facility: CLINIC | Age: 45
End: 2025-06-16

## 2025-06-16 ENCOUNTER — LAB (OUTPATIENT)
Dept: LAB | Facility: CLINIC | Age: 45
End: 2025-06-16
Payer: COMMERCIAL

## 2025-06-16 DIAGNOSIS — D68.52 PROTHROMBIN GENE MUTATION: ICD-10-CM

## 2025-06-16 DIAGNOSIS — Z79.01 LONG TERM CURRENT USE OF ANTICOAGULANT THERAPY: Primary | ICD-10-CM

## 2025-06-16 DIAGNOSIS — Z79.01 LONG TERM CURRENT USE OF ANTICOAGULANT THERAPY: ICD-10-CM

## 2025-06-16 LAB
INR PPP: 2.96 (ref 0.85–1.15)
PROTHROMBIN TIME: 30.3 SECONDS (ref 11.8–14.8)

## 2025-06-16 PROCEDURE — 36415 COLL VENOUS BLD VENIPUNCTURE: CPT

## 2025-06-16 PROCEDURE — 85610 PROTHROMBIN TIME: CPT

## 2025-06-16 NOTE — PROGRESS NOTES
ANTICOAGULATION MANAGEMENT     Stacey Maki 45 year old female is on warfarin with therapeutic INR result. (Goal INR 2.0-3.0)    Recent labs: (last 7 days)     06/16/25  1625   INR 2.96*       ASSESSMENT     Source(s): Chart Review and Patient/Caregiver Call     Warfarin doses taken: Warfarin taken as instructed  Diet: No new diet changes identified  Medication/supplement changes: None noted  New illness, injury, or hospitalization: No  Signs or symptoms of bleeding or clotting: No  Previous result: Therapeutic last visit; previously outside of goal range  Additional findings: None       PLAN     Recommended plan for no diet, medication or health factor changes affecting INR     Dosing Instructions: Continue your current warfarin dose with next INR in 4 weeks       Summary  As of 6/16/2025      Full warfarin instructions:  6 mg every Wed; 4 mg all other days   Next INR check:  7/14/2025               Telephone call with Stacey who verbalizes understanding and agrees to plan    Lab visit scheduled    Education provided: Please call back if any changes to your diet, medications or how you've been taking warfarin  Goal range and lab monitoring: goal range and significance of current result, Importance of therapeutic range, and Importance of following up at instructed interval    Plan made per [facility] anticoagulation protocol    Ruthie Jones, RN  6/16/2025  Anticoagulation Clinic  [employer] for routing messages: lisa [facility] patient phone line: [phone]        _______________________________________________________________________     Anticoagulation Episode Summary       Current INR goal:  2.0-3.0   TTR:  96.8% (1 y)   Target end date:  Indefinite   Send INR reminders to:  [facility]    Indications    Long term current use of anticoagulant therapy [Z79.01]  Prothrombin gene mutation [D68.52]             Comments:  --             Anticoagulation Care Providers       Provider Role Specialty Phone number     Danny Clark MD Referring Hematology [phone]    Norman Albert PA-C Referring Hematology [phone]

## 2025-06-17 ENCOUNTER — DOCUMENTATION ONLY (OUTPATIENT)
Dept: ANTICOAGULATION | Facility: CLINIC | Age: 45
End: 2025-06-17
Payer: COMMERCIAL

## 2025-06-17 DIAGNOSIS — Z79.01 LONG TERM CURRENT USE OF ANTICOAGULANT THERAPY: Primary | ICD-10-CM

## 2025-06-17 DIAGNOSIS — D68.52 PROTHROMBIN GENE MUTATION: ICD-10-CM

## 2025-06-17 NOTE — PROGRESS NOTES
ANTICOAGULATION CLINIC REFERRAL RENEWAL REQUEST       An annual renewal order is required for all patients referred to [facility] Anticoagulation Clinic.?  Please review and sign the pended referral order for Stacey Maki.       ANTICOAGULATION SUMMARY      Warfarin indication(s)   Prothrombin Gene Mutation    Mechanical heart valve present?  NO       Current goal range   INR: 2.0-3.0     Goal appropriate for indication? Goal INR 2-3, standard for indication(s) above     Time in Therapeutic Range (TTR)  (Goal > 60%) 96.8%       Office visit with referring provider's group within last year yes on 2/4/25       KAEY GILMAN RN  [facility] Anticoagulation Clinic

## 2025-07-21 ENCOUNTER — MYC MEDICAL ADVICE (OUTPATIENT)
Dept: ANTICOAGULATION | Facility: CLINIC | Age: 45
End: 2025-07-21
Payer: COMMERCIAL

## 2025-07-23 ENCOUNTER — ANTICOAGULATION THERAPY VISIT (OUTPATIENT)
Dept: ANTICOAGULATION | Facility: CLINIC | Age: 45
End: 2025-07-23

## 2025-07-23 ENCOUNTER — LAB (OUTPATIENT)
Dept: LAB | Facility: CLINIC | Age: 45
End: 2025-07-23
Payer: COMMERCIAL

## 2025-07-23 DIAGNOSIS — Z79.01 LONG TERM CURRENT USE OF ANTICOAGULANT THERAPY: ICD-10-CM

## 2025-07-23 DIAGNOSIS — D68.52 PROTHROMBIN GENE MUTATION: ICD-10-CM

## 2025-07-23 LAB
INR PPP: 2.06 (ref 0.85–1.15)
PROTHROMBIN TIME: 23 SECONDS (ref 11.8–14.8)

## 2025-07-23 PROCEDURE — 85610 PROTHROMBIN TIME: CPT

## 2025-07-23 PROCEDURE — 36415 COLL VENOUS BLD VENIPUNCTURE: CPT

## 2025-07-23 NOTE — PROGRESS NOTES
ANTICOAGULATION MANAGEMENT     Stacey Maki 45 year old female is on warfarin with therapeutic INR result. (Goal INR 2.0-3.0)    Recent labs: (last 7 days)     07/23/25  1636   INR 2.06*       ASSESSMENT     Source(s): Chart Review  Previous INR was Therapeutic last 2(+) visits  Medication, diet, health changes since last INR: chart reviewed; none identified         PLAN         Dosing Instructions: Continue your current warfarin dose with next INR in 4-6 weeks       Summary  As of 7/23/2025      Full warfarin instructions:  6 mg every Wed; 4 mg all other days   Next INR check:  9/3/2025               Detailed voice message left for Stacey with dosing instructions and follow up date.     Contact [phone] to schedule and with any changes, questions or concerns.     Education provided: Please call back if any changes to your diet, medications or how you've been taking warfarin  Contact [phone] with any changes, questions or concerns.     Plan made per [facility] anticoagulation protocol    Renetta Ng RN  7/23/2025  Anticoagulation Clinic  [employer] for routing messages: lisa [facility] patient phone line: [phone]        _______________________________________________________________________     Anticoagulation Episode Summary       Current INR goal:  2.0-3.0   TTR:  96.8% (1 y)   Target end date:  Indefinite   Send INR reminders to:  [facility]    Indications    Long term current use of anticoagulant therapy [Z79.01]  Prothrombin gene mutation [D68.52]             Comments:  --             Anticoagulation Care Providers       Provider Role Specialty Phone number    Danny Clark MD Referring Hematology [phone]    Norman Albert PA-C Referring Hematology [phone]

## 2025-08-12 DIAGNOSIS — Z79.01 LONG TERM CURRENT USE OF ANTICOAGULANT THERAPY: ICD-10-CM

## 2025-08-12 DIAGNOSIS — D68.52 PROTHROMBIN GENE MUTATION: ICD-10-CM

## 2025-08-12 RX ORDER — WARFARIN SODIUM 2 MG/1
TABLET ORAL
Qty: 252 TABLET | Refills: 1 | Status: SHIPPED | OUTPATIENT
Start: 2025-08-12

## (undated) DEVICE — KIT ENDO TURNOVER/PROCEDURE W/CLEAN A SCOPE LINERS 103888

## (undated) DEVICE — ENDO TRAP POLYP QUICK CATCH 710201

## (undated) DEVICE — ESU GROUND PAD ADULT W/CORD E7507

## (undated) DEVICE — ENDO SNARE POLYPECTOMY OVAL 15MM LOOP SD-240U-15

## (undated) RX ORDER — FENTANYL CITRATE 50 UG/ML
INJECTION, SOLUTION INTRAMUSCULAR; INTRAVENOUS
Status: DISPENSED
Start: 2024-01-08